# Patient Record
Sex: FEMALE | Race: BLACK OR AFRICAN AMERICAN | NOT HISPANIC OR LATINO
[De-identification: names, ages, dates, MRNs, and addresses within clinical notes are randomized per-mention and may not be internally consistent; named-entity substitution may affect disease eponyms.]

---

## 2020-09-15 ENCOUNTER — NON-APPOINTMENT (OUTPATIENT)
Age: 25
End: 2020-09-15

## 2020-09-15 ENCOUNTER — APPOINTMENT (OUTPATIENT)
Dept: OPHTHALMOLOGY | Facility: CLINIC | Age: 25
End: 2020-09-15
Payer: COMMERCIAL

## 2020-09-15 DIAGNOSIS — H10.13 ACUTE ATOPIC CONJUNCTIVITIS, BILATERAL: ICD-10-CM

## 2020-09-15 PROCEDURE — 92004 COMPRE OPH EXAM NEW PT 1/>: CPT

## 2020-11-05 ENCOUNTER — APPOINTMENT (OUTPATIENT)
Dept: OBGYN | Facility: CLINIC | Age: 25
End: 2020-11-05
Payer: COMMERCIAL

## 2020-11-05 VITALS
OXYGEN SATURATION: 98 % | HEIGHT: 69 IN | HEART RATE: 77 BPM | SYSTOLIC BLOOD PRESSURE: 113 MMHG | TEMPERATURE: 98.2 F | DIASTOLIC BLOOD PRESSURE: 77 MMHG | BODY MASS INDEX: 19.99 KG/M2 | WEIGHT: 135 LBS

## 2020-11-05 DIAGNOSIS — Z86.19 PERSONAL HISTORY OF OTHER INFECTIOUS AND PARASITIC DISEASES: ICD-10-CM

## 2020-11-05 DIAGNOSIS — B37.9 CANDIDIASIS, UNSPECIFIED: ICD-10-CM

## 2020-11-05 PROCEDURE — 99072 ADDL SUPL MATRL&STAF TM PHE: CPT

## 2020-11-05 PROCEDURE — 99203 OFFICE O/P NEW LOW 30 MIN: CPT

## 2020-11-05 RX ORDER — OLOPATADINE HYDROCHLORIDE 2 MG/ML
0.2 SOLUTION OPHTHALMIC DAILY
Qty: 1 | Refills: 6 | Status: COMPLETED | COMMUNITY
Start: 2020-09-15 | End: 2020-11-05

## 2020-11-05 NOTE — HISTORY OF PRESENT ILLNESS
[Rt Vulva] : right vulva [Chronic] : chronic [Hx HSV] : history of HSV [TextBox_4] : itching of vulva  [Normal Amount/Duration] :  normal amount and duration [Regular Cycle Intervals] : periods have been regular [Menarche Age: ____] : age at menarche was [unfilled] [FreeTextEntry1] : 11/04/2020 [Currently Active] : currently active [Men] : men [Vaginal] : vaginal [No] : No

## 2020-11-05 NOTE — PHYSICAL EXAM
[Appropriately responsive] : appropriately responsive [Alert] : alert [No Acute Distress] : no acute distress [No Lymphadenopathy] : no lymphadenopathy [Regular Rate Rhythm] : regular rate rhythm [No Murmurs] : no murmurs [Clear to Auscultation B/L] : clear to auscultation bilaterally [Soft] : soft [Non-tender] : non-tender [Non-distended] : non-distended [No HSM] : No HSM [No Lesions] : no lesions [No Mass] : no mass [Oriented x3] : oriented x3 [Examination Of The Breasts] : a normal appearance [No Masses] : no breast masses were palpable [Normal] : normal [Discharge] : discharge [Moderate] : moderate [Foul Smelling] : foul smelling [White] : white [Thick] : thick

## 2020-11-06 LAB
C TRACH RRNA SPEC QL NAA+PROBE: NOT DETECTED
N GONORRHOEA RRNA SPEC QL NAA+PROBE: NOT DETECTED
SOURCE AMPLIFICATION: NORMAL

## 2020-11-08 DIAGNOSIS — N76.0 ACUTE VAGINITIS: ICD-10-CM

## 2020-11-08 DIAGNOSIS — B96.89 ACUTE VAGINITIS: ICD-10-CM

## 2020-11-08 LAB
CANDIDA VAG CYTO: NOT DETECTED
G VAGINALIS+PREV SP MTYP VAG QL MICRO: DETECTED
T VAGINALIS VAG QL WET PREP: NOT DETECTED

## 2020-11-08 RX ORDER — FLUCONAZOLE 200 MG/1
200 TABLET ORAL
Qty: 2 | Refills: 0 | Status: COMPLETED | COMMUNITY
Start: 2020-08-13

## 2020-11-08 RX ORDER — AZITHROMYCIN 500 MG/1
500 TABLET, FILM COATED ORAL
Qty: 4 | Refills: 0 | Status: COMPLETED | COMMUNITY
Start: 2020-08-16

## 2020-12-23 PROBLEM — Z86.19 HISTORY OF CANDIDAL VULVOVAGINITIS: Status: RESOLVED | Noted: 2020-11-05 | Resolved: 2020-12-23

## 2020-12-23 PROBLEM — N76.0 BACTERIAL VAGINITIS: Status: RESOLVED | Noted: 2020-11-08 | Resolved: 2020-12-23

## 2021-04-15 ENCOUNTER — APPOINTMENT (OUTPATIENT)
Dept: OBGYN | Facility: CLINIC | Age: 26
End: 2021-04-15
Payer: COMMERCIAL

## 2021-04-15 VITALS
OXYGEN SATURATION: 99 % | BODY MASS INDEX: 19.4 KG/M2 | HEART RATE: 88 BPM | DIASTOLIC BLOOD PRESSURE: 73 MMHG | HEIGHT: 69 IN | SYSTOLIC BLOOD PRESSURE: 125 MMHG | WEIGHT: 131 LBS | TEMPERATURE: 97.9 F

## 2021-04-15 DIAGNOSIS — R82.90 UNSPECIFIED ABNORMAL FINDINGS IN URINE: ICD-10-CM

## 2021-04-15 DIAGNOSIS — A60.00 HERPESVIRAL INFECTION OF UROGENITAL SYSTEM, UNSPECIFIED: ICD-10-CM

## 2021-04-15 DIAGNOSIS — R87.610 ATYPICAL SQUAMOUS CELLS OF UNDETERMINED SIGNIFICANCE ON CYTOLOGIC SMEAR OF CERVIX (ASC-US): ICD-10-CM

## 2021-04-15 DIAGNOSIS — R35.0 FREQUENCY OF MICTURITION: ICD-10-CM

## 2021-04-15 PROCEDURE — 99072 ADDL SUPL MATRL&STAF TM PHE: CPT

## 2021-04-15 PROCEDURE — 99214 OFFICE O/P EST MOD 30 MIN: CPT

## 2021-04-15 RX ORDER — VALACYCLOVIR 1 G/1
1 TABLET, FILM COATED ORAL
Qty: 30 | Refills: 0 | Status: COMPLETED | COMMUNITY
Start: 2020-10-16 | End: 2021-04-15

## 2021-04-15 NOTE — PHYSICAL EXAM
[Appropriately responsive] : appropriately responsive [Alert] : alert [No Acute Distress] : no acute distress [No Lymphadenopathy] : no lymphadenopathy [Regular Rate Rhythm] : regular rate rhythm [No Murmurs] : no murmurs [Clear to Auscultation B/L] : clear to auscultation bilaterally [Soft] : soft [Non-tender] : non-tender [Non-distended] : non-distended [No HSM] : No HSM [No Lesions] : no lesions [No Mass] : no mass [Oriented x3] : oriented x3 [Examination Of The Breasts] : a normal appearance [No Masses] : no breast masses were palpable [Discharge] : discharge [Moderate] : moderate [Foul Smelling] : not foul smelling [White] : white [Blood-Tinged] : blood-tinged [Erosion] : erosions [Normal] : normal [Normal Position] : in a normal position [Uterine Adnexae] : normal

## 2021-04-15 NOTE — HISTORY OF PRESENT ILLNESS
[Normal Amount/Duration] :  normal amount and duration [Regular Cycle Intervals] : periods have been regular [Menarche Age: ____] : age at menarche was [unfilled] [FreeTextEntry1] : 04/02/2021 [No] : Patient does not have concerns regarding sex [Currently Active] : currently active [Men] : men [Vaginal] : vaginal

## 2021-04-15 NOTE — PLAN
[FreeTextEntry1] : patient states she has abnormal pap smear&did leep in 06/2019 at [facility]

## 2021-04-16 LAB
APPEARANCE: CLEAR
BACTERIA: NEGATIVE
BILIRUBIN URINE: NEGATIVE
BLOOD URINE: NEGATIVE
COLOR: YELLOW
GLUCOSE QUALITATIVE U: NEGATIVE
HYALINE CASTS: 1 /LPF
KETONES URINE: NEGATIVE
LEUKOCYTE ESTERASE URINE: NEGATIVE
MICROSCOPIC-UA: NORMAL
NITRITE URINE: NEGATIVE
PH URINE: 7.5
PROTEIN URINE: ABNORMAL
RED BLOOD CELLS URINE: 2 /HPF
SPECIFIC GRAVITY URINE: 1.03
SQUAMOUS EPITHELIAL CELLS: 2 /HPF
UROBILINOGEN URINE: ABNORMAL
WHITE BLOOD CELLS URINE: 2 /HPF

## 2021-04-17 LAB
BACTERIA UR CULT: NORMAL
C TRACH RRNA SPEC QL NAA+PROBE: NOT DETECTED
CANDIDA VAG CYTO: NOT DETECTED
G VAGINALIS+PREV SP MTYP VAG QL MICRO: DETECTED
HPV HIGH+LOW RISK DNA PNL CVX: NOT DETECTED
N GONORRHOEA RRNA SPEC QL NAA+PROBE: NOT DETECTED
SOURCE TP AMPLIFICATION: NORMAL
T VAGINALIS VAG QL WET PREP: NOT DETECTED

## 2021-04-19 ENCOUNTER — TRANSCRIPTION ENCOUNTER (OUTPATIENT)
Age: 26
End: 2021-04-19

## 2021-04-20 LAB — CYTOLOGY CVX/VAG DOC THIN PREP: NORMAL

## 2021-04-21 RX ORDER — METRONIDAZOLE 500 MG/1
500 TABLET ORAL TWICE DAILY
Qty: 14 | Refills: 3 | Status: DISCONTINUED | COMMUNITY
Start: 2020-11-08 | End: 2021-04-21

## 2021-05-19 ENCOUNTER — APPOINTMENT (OUTPATIENT)
Dept: INTERNAL MEDICINE | Facility: CLINIC | Age: 26
End: 2021-05-19
Payer: COMMERCIAL

## 2021-05-19 VITALS
WEIGHT: 132 LBS | BODY MASS INDEX: 19.55 KG/M2 | SYSTOLIC BLOOD PRESSURE: 123 MMHG | HEIGHT: 69 IN | HEART RATE: 69 BPM | DIASTOLIC BLOOD PRESSURE: 79 MMHG | OXYGEN SATURATION: 98 % | TEMPERATURE: 98 F

## 2021-05-19 PROCEDURE — 99385 PREV VISIT NEW AGE 18-39: CPT

## 2021-05-19 RX ORDER — TERCONAZOLE 8 MG/G
0.8 CREAM VAGINAL
Qty: 1 | Refills: 3 | Status: DISCONTINUED | COMMUNITY
Start: 2021-04-15 | End: 2021-05-19

## 2021-05-19 RX ORDER — TERCONAZOLE 8 MG/G
0.8 CREAM VAGINAL
Qty: 1 | Refills: 5 | Status: DISCONTINUED | COMMUNITY
Start: 2020-11-05 | End: 2021-05-19

## 2021-05-19 RX ORDER — METRONIDAZOLE 500 MG/1
500 TABLET ORAL TWICE DAILY
Qty: 14 | Refills: 3 | Status: DISCONTINUED | COMMUNITY
Start: 2021-04-15 | End: 2021-05-19

## 2021-05-19 NOTE — PHYSICAL EXAM
[No Acute Distress] : no acute distress [Well Developed] : well developed [Well-Appearing] : well-appearing [No Lymphadenopathy] : no lymphadenopathy [Supple] : supple [No Respiratory Distress] : no respiratory distress  [No Accessory Muscle Use] : no accessory muscle use [Clear to Auscultation] : lungs were clear to auscultation bilaterally [Normal Rate] : normal rate  [Regular Rhythm] : with a regular rhythm [Normal S1, S2] : normal S1 and S2 [No Edema] : there was no peripheral edema [Soft] : abdomen soft [Non Tender] : non-tender [Non-distended] : non-distended [Normal Bowel Sounds] : normal bowel sounds [No Spinal Tenderness] : no spinal tenderness [No Joint Swelling] : no joint swelling [No Skin Lesions] : no skin lesions [Normal Gait] : normal gait [Normal Affect] : the affect was normal [Alert and Oriented x3] : oriented to person, place, and time

## 2021-05-19 NOTE — HEALTH RISK ASSESSMENT
[Very Good] : ~his/her~  mood as very good [Yes] : Yes [2 - 4 times a month (2 pts)] : 2-4 times a month (2 points) [1 or 2 (0 pts)] : 1 or 2 (0 points) [Never (0 pts)] : Never (0 points) [No] : In the past 12 months have you used drugs other than those required for medical reasons? No [No falls in past year] : Patient reported no falls in the past year [0] : 2) Feeling down, depressed, or hopeless: Not at all (0) [Patient reported PAP Smear was normal] : Patient reported PAP Smear was normal [HIV Test offered] : HIV Test offered [Hepatitis C test offered] : Hepatitis C test offered [# of Members in Household ___] :  household currently consist of [unfilled] member(s) [Employed] : employed [Student] : student [Single] : single [Feels Safe at Home] : Feels safe at home [Smoke Detector] : smoke detector [Carbon Monoxide Detector] : carbon monoxide detector [Seat Belt] :  uses seat belt [Sunscreen] : uses sunscreen [] : No [Audit-CScore] : 2 [de-identified] : None [de-identified] : Regular diet. [[id number]] : 0 [Reports changes in hearing] : Reports no changes in hearing [Reports changes in vision] : Reports no changes in vision [Reports changes in dental health] : Reports no changes in dental health [PapSmearDate] : 04/21 [de-identified] : Roommate

## 2021-05-19 NOTE — HISTORY OF PRESENT ILLNESS
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

## 2021-05-21 LAB
ALBUMIN SERPL ELPH-MCNC: 4.6 G/DL
ALP BLD-CCNC: 48 U/L
ALT SERPL-CCNC: 12 U/L
ANION GAP SERPL CALC-SCNC: 14 MMOL/L
APPEARANCE: CLEAR
AST SERPL-CCNC: 16 U/L
BASOPHILS # BLD AUTO: 0.04 K/UL
BASOPHILS NFR BLD AUTO: 0.6 %
BILIRUB SERPL-MCNC: 0.3 MG/DL
BILIRUBIN URINE: NEGATIVE
BLOOD URINE: NEGATIVE
BUN SERPL-MCNC: 9 MG/DL
CALCIUM SERPL-MCNC: 9.9 MG/DL
CHLORIDE SERPL-SCNC: 103 MMOL/L
CHOLEST SERPL-MCNC: 187 MG/DL
CO2 SERPL-SCNC: 21 MMOL/L
COLOR: YELLOW
CREAT SERPL-MCNC: 0.74 MG/DL
EOSINOPHIL # BLD AUTO: 0.1 K/UL
EOSINOPHIL NFR BLD AUTO: 1.4 %
ESTIMATED AVERAGE GLUCOSE: 100 MG/DL
GLUCOSE QUALITATIVE U: NEGATIVE
GLUCOSE SERPL-MCNC: 76 MG/DL
HBA1C MFR BLD HPLC: 5.1 %
HBV CORE IGG+IGM SER QL: NONREACTIVE
HBV CORE IGM SER QL: NONREACTIVE
HBV SURFACE AB SER QL: REACTIVE
HBV SURFACE AG SER QL: NONREACTIVE
HCT VFR BLD CALC: 38.8 %
HCV AB SER QL: NONREACTIVE
HCV S/CO RATIO: 0.16 S/CO
HDLC SERPL-MCNC: 67 MG/DL
HGB BLD-MCNC: 12.2 G/DL
IMM GRANULOCYTES NFR BLD AUTO: 0.1 %
KETONES URINE: ABNORMAL
LDLC SERPL CALC-MCNC: 102 MG/DL
LEUKOCYTE ESTERASE URINE: NEGATIVE
LYMPHOCYTES # BLD AUTO: 3.43 K/UL
LYMPHOCYTES NFR BLD AUTO: 47.9 %
MAN DIFF?: NORMAL
MCHC RBC-ENTMCNC: 26.2 PG
MCHC RBC-ENTMCNC: 31.4 GM/DL
MCV RBC AUTO: 83.4 FL
MEV IGG FLD QL IA: 102 AU/ML
MEV IGG+IGM SER-IMP: POSITIVE
MONOCYTES # BLD AUTO: 0.36 K/UL
MONOCYTES NFR BLD AUTO: 5 %
NEUTROPHILS # BLD AUTO: 3.22 K/UL
NEUTROPHILS NFR BLD AUTO: 45 %
NITRITE URINE: NEGATIVE
NONHDLC SERPL-MCNC: 120 MG/DL
PH URINE: 6.5
PLATELET # BLD AUTO: 339 K/UL
POTASSIUM SERPL-SCNC: 3.8 MMOL/L
PROT SERPL-MCNC: 8.2 G/DL
PROTEIN URINE: NORMAL
RBC # BLD: 4.65 M/UL
RBC # FLD: 12.7 %
RUBV IGG FLD-ACNC: 7.8 INDEX
RUBV IGG SER-IMP: POSITIVE
RUBV IGM FLD-ACNC: <20 AU/ML
SODIUM SERPL-SCNC: 138 MMOL/L
SPECIFIC GRAVITY URINE: 1.03
TRIGL SERPL-MCNC: 92 MG/DL
UROBILINOGEN URINE: NORMAL
VZV AB TITR SER: POSITIVE
VZV IGG SER IF-ACNC: 587.1 INDEX
WBC # FLD AUTO: 7.16 K/UL

## 2021-05-26 ENCOUNTER — NON-APPOINTMENT (OUTPATIENT)
Age: 26
End: 2021-05-26

## 2021-05-26 LAB
HIV1+2 AB SPEC QL IA.RAPID: NONREACTIVE
M TB IFN-G BLD-IMP: NEGATIVE
MEV IGM SER QL: <0.91 ISR
QUANTIFERON TB PLUS MITOGEN MINUS NIL: 7.75 IU/ML
QUANTIFERON TB PLUS NIL: 0.07 IU/ML
QUANTIFERON TB PLUS TB1 MINUS NIL: -0.03 IU/ML
QUANTIFERON TB PLUS TB2 MINUS NIL: -0.02 IU/ML

## 2021-06-07 ENCOUNTER — APPOINTMENT (OUTPATIENT)
Dept: OBGYN | Facility: CLINIC | Age: 26
End: 2021-06-07
Payer: COMMERCIAL

## 2021-06-07 VITALS
HEART RATE: 77 BPM | WEIGHT: 133 LBS | SYSTOLIC BLOOD PRESSURE: 116 MMHG | BODY MASS INDEX: 19.7 KG/M2 | HEIGHT: 69 IN | TEMPERATURE: 97.9 F | DIASTOLIC BLOOD PRESSURE: 71 MMHG

## 2021-06-07 DIAGNOSIS — N76.0 ACUTE VAGINITIS: ICD-10-CM

## 2021-06-07 PROCEDURE — 99214 OFFICE O/P EST MOD 30 MIN: CPT

## 2021-06-09 LAB
CANDIDA VAG CYTO: NOT DETECTED
G VAGINALIS+PREV SP MTYP VAG QL MICRO: NOT DETECTED
T VAGINALIS VAG QL WET PREP: NOT DETECTED

## 2021-06-09 NOTE — HISTORY OF PRESENT ILLNESS
[FreeTextEntry1] : 24yo P0 LMP 5/16/2021 here for recurrent bv. \par reports has itchiness and discharge. \par since LEE 6/2019, been having recurrent bv and sometimes yeast infection.\par been treated for bv at least 3 times in the past 6 months. \par \par she is sexually active with long term male partner.\par uses condoms but symptoms not correlating to sexual intercourse. does not use feminine products, does not douche, uses mostly cotton underwear and pads, does not wear tight pants often.

## 2021-06-09 NOTE — DISCUSSION/SUMMARY
[FreeTextEntry1] : [] affirm\par [] vulvar hygiene reviewed\par [] metrogel treatment dose then 2x/wk suppression. instructions provided. pt understands to avoid alcohol consumption while taking this medication\par Michael ORNELAS

## 2021-06-14 ENCOUNTER — APPOINTMENT (OUTPATIENT)
Dept: OBGYN | Facility: CLINIC | Age: 26
End: 2021-06-14

## 2021-09-09 ENCOUNTER — APPOINTMENT (OUTPATIENT)
Dept: INTERNAL MEDICINE | Facility: CLINIC | Age: 26
End: 2021-09-09
Payer: COMMERCIAL

## 2021-09-09 VITALS
BODY MASS INDEX: 19.55 KG/M2 | TEMPERATURE: 97.8 F | OXYGEN SATURATION: 97 % | SYSTOLIC BLOOD PRESSURE: 124 MMHG | DIASTOLIC BLOOD PRESSURE: 77 MMHG | WEIGHT: 132 LBS | HEART RATE: 70 BPM | HEIGHT: 69 IN

## 2021-09-09 DIAGNOSIS — Z23 ENCOUNTER FOR IMMUNIZATION: ICD-10-CM

## 2021-09-09 DIAGNOSIS — L91.8 OTHER HYPERTROPHIC DISORDERS OF THE SKIN: ICD-10-CM

## 2021-09-09 PROCEDURE — 99213 OFFICE O/P EST LOW 20 MIN: CPT

## 2021-09-11 PROBLEM — L91.8 SKIN TAG: Status: ACTIVE | Noted: 2021-09-11

## 2021-09-11 PROBLEM — Z23 ENCOUNTER FOR IMMUNIZATION: Status: ACTIVE | Noted: 2021-09-09

## 2021-09-11 NOTE — HEALTH RISK ASSESSMENT
[Yes] : Yes [Monthly or less (1 pt)] : Monthly or less (1 point) [1 or 2 (0 pts)] : 1 or 2 (0 points) [Never (0 pts)] : Never (0 points) [No] : In the past 12 months have you used drugs other than those required for medical reasons? No [No falls in past year] : Patient reported no falls in the past year [0] : 2) Feeling down, depressed, or hopeless: Not at all (0) [] : No [Audit-CScore] : 1 [de-identified] : GYN [de-identified] : None [de-identified] : Regular Diet [[id number]] : 0

## 2021-09-11 NOTE — HISTORY OF PRESENT ILLNESS
[de-identified] : 26 yo F presenting for meningococcal and influenza vaccinations and concern for a rectal skin tag/hemorrhoid.

## 2021-09-11 NOTE — PHYSICAL EXAM
[No Acute Distress] : no acute distress [Well Developed] : well developed [Well-Appearing] : well-appearing [FreeTextEntry1] : skin tag noted at rectal opening, no tears, fissures, bleeding or erythema noted

## 2022-05-09 ENCOUNTER — APPOINTMENT (OUTPATIENT)
Dept: OBGYN | Facility: CLINIC | Age: 27
End: 2022-05-09
Payer: MEDICAID

## 2022-05-09 VITALS
OXYGEN SATURATION: 98 % | HEART RATE: 97 BPM | SYSTOLIC BLOOD PRESSURE: 115 MMHG | WEIGHT: 125 LBS | DIASTOLIC BLOOD PRESSURE: 71 MMHG | RESPIRATION RATE: 12 BRPM | HEIGHT: 69 IN | TEMPERATURE: 98.4 F | BODY MASS INDEX: 18.51 KG/M2

## 2022-05-09 DIAGNOSIS — Z87.410 PERSONAL HISTORY OF CERVICAL DYSPLASIA: ICD-10-CM

## 2022-05-09 DIAGNOSIS — Z01.419 ENCOUNTER FOR GYNECOLOGICAL EXAMINATION (GENERAL) (ROUTINE) W/OUT ABNORMAL FINDINGS: ICD-10-CM

## 2022-05-09 PROCEDURE — 99395 PREV VISIT EST AGE 18-39: CPT

## 2022-05-09 NOTE — HISTORY OF PRESENT ILLNESS
[FreeTextEntry1] : patient here for annual gyn exam.\par no new complaints. did suppression therapy for bv for 3months then stopped. no bv since then.\par no changes in health or menses.

## 2022-05-13 LAB — CYTOLOGY CVX/VAG DOC THIN PREP: NORMAL

## 2022-06-22 ENCOUNTER — LABORATORY RESULT (OUTPATIENT)
Age: 27
End: 2022-06-22

## 2022-06-22 ENCOUNTER — APPOINTMENT (OUTPATIENT)
Dept: INTERNAL MEDICINE | Facility: CLINIC | Age: 27
End: 2022-06-22
Payer: MEDICAID

## 2022-06-22 VITALS
HEIGHT: 69 IN | HEART RATE: 65 BPM | WEIGHT: 125 LBS | SYSTOLIC BLOOD PRESSURE: 110 MMHG | TEMPERATURE: 98 F | OXYGEN SATURATION: 100 % | BODY MASS INDEX: 18.51 KG/M2 | DIASTOLIC BLOOD PRESSURE: 71 MMHG

## 2022-06-22 DIAGNOSIS — Z02.1 ENCOUNTER FOR PRE-EMPLOYMENT EXAMINATION: ICD-10-CM

## 2022-06-22 PROCEDURE — 99395 PREV VISIT EST AGE 18-39: CPT | Mod: 25

## 2022-06-22 PROCEDURE — G0444 DEPRESSION SCREEN ANNUAL: CPT | Mod: 59

## 2022-06-22 NOTE — ASSESSMENT
[FreeTextEntry1] : annual exam- well adult\par screening and labs ordered\par further recs pending labs\par \par Blood work drawn in office today\par \par

## 2022-06-22 NOTE — HISTORY OF PRESENT ILLNESS
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

## 2022-06-22 NOTE — HEALTH RISK ASSESSMENT
[Good] : ~his/her~  mood as  good [Never] : Never [Yes] : Yes [2 - 4 times a month (2 pts)] : 2-4 times a month (2 points) [1 or 2 (0 pts)] : 1 or 2 (0 points) [Never (0 pts)] : Never (0 points) [No] : In the past 12 months have you used drugs other than those required for medical reasons? No [No falls in past year] : Patient reported no falls in the past year [0] : 2) Feeling down, depressed, or hopeless: Not at all (0) [Patient reported PAP Smear was normal] : Patient reported PAP Smear was normal [HIV Test offered] : HIV Test offered [Hepatitis C test offered] : Hepatitis C test offered [With Family] : lives with family [Employed] : employed [Single] : single [Smoke Detector] : smoke detector [Carbon Monoxide Detector] : carbon monoxide detector [Seat Belt] :  uses seat belt [PHQ-2 Negative - No further assessment needed] : PHQ-2 Negative - No further assessment needed [de-identified] : No [de-identified] : Gynecologist Dr. Whitman [Audit-CScore] : 2 [de-identified] : Push up every day [de-identified] : Healthy [[id number]] : 0 [Reports changes in hearing] : Reports no changes in hearing [Reports changes in vision] : Reports no changes in vision [Reports changes in dental health] : Reports no changes in dental health [Sunscreen] : does not use sunscreen [PapSmearDate] : 05/22 [FreeTextEntry3] : Dating

## 2022-06-23 DIAGNOSIS — D64.9 ANEMIA, UNSPECIFIED: ICD-10-CM

## 2022-06-23 LAB
FERRITIN SERPL-MCNC: 52 NG/ML
IRON SATN MFR SERPL: 32 %
IRON SERPL-MCNC: 112 UG/DL
TIBC SERPL-MCNC: 352 UG/DL
UIBC SERPL-MCNC: 240 UG/DL

## 2022-06-24 ENCOUNTER — APPOINTMENT (OUTPATIENT)
Dept: INTERNAL MEDICINE | Facility: CLINIC | Age: 27
End: 2022-06-24

## 2022-06-24 ENCOUNTER — NON-APPOINTMENT (OUTPATIENT)
Age: 27
End: 2022-06-24

## 2022-07-05 LAB
MEV IGG FLD QL IA: 107 AU/ML
MEV IGG+IGM SER-IMP: POSITIVE
MUV AB SER-ACNC: POSITIVE
MUV IGG SER QL IA: 239 AU/ML
RUBV IGG FLD-ACNC: 6.3 INDEX
RUBV IGG SER-IMP: POSITIVE
VZV AB TITR SER: POSITIVE
VZV IGG SER IF-ACNC: 683.9 INDEX

## 2022-07-06 LAB
ALBUMIN SERPL ELPH-MCNC: 4.7 G/DL
ALP BLD-CCNC: 48 U/L
ALT SERPL-CCNC: 12 U/L
ANION GAP SERPL CALC-SCNC: 10 MMOL/L
APPEARANCE: CLEAR
AST SERPL-CCNC: 16 U/L
BASOPHILS # BLD AUTO: 0.02 K/UL
BASOPHILS NFR BLD AUTO: 0.4 %
BILIRUB SERPL-MCNC: 0.7 MG/DL
BILIRUBIN URINE: NEGATIVE
BLOOD URINE: NEGATIVE
BUN SERPL-MCNC: 8 MG/DL
CALCIUM SERPL-MCNC: 9.5 MG/DL
CHLORIDE SERPL-SCNC: 107 MMOL/L
CO2 SERPL-SCNC: 24 MMOL/L
COLOR: YELLOW
CREAT SERPL-MCNC: 0.82 MG/DL
EGFR: 100 ML/MIN/1.73M2
EOSINOPHIL # BLD AUTO: 0.09 K/UL
EOSINOPHIL NFR BLD AUTO: 1.7 %
GLUCOSE QUALITATIVE U: NEGATIVE
GLUCOSE SERPL-MCNC: 70 MG/DL
HBV SURFACE AB SERPL IA-ACNC: 103.9 MIU/ML
HBV SURFACE AG SER QL: NONREACTIVE
HCT VFR BLD CALC: 35.8 %
HCV AB SER QL: NONREACTIVE
HCV S/CO RATIO: 0.13 S/CO
HGB BLD-MCNC: 11.3 G/DL
HIV1+2 AB SPEC QL IA.RAPID: NONREACTIVE
IMM GRANULOCYTES NFR BLD AUTO: 0.2 %
KETONES URINE: NEGATIVE
LEUKOCYTE ESTERASE URINE: NEGATIVE
LYMPHOCYTES # BLD AUTO: 2.25 K/UL
LYMPHOCYTES NFR BLD AUTO: 42.1 %
MAN DIFF?: NORMAL
MCHC RBC-ENTMCNC: 27 PG
MCHC RBC-ENTMCNC: 31.6 GM/DL
MCV RBC AUTO: 85.4 FL
MONOCYTES # BLD AUTO: 0.39 K/UL
MONOCYTES NFR BLD AUTO: 7.3 %
NEUTROPHILS # BLD AUTO: 2.59 K/UL
NEUTROPHILS NFR BLD AUTO: 48.3 %
NITRITE URINE: NEGATIVE
PH URINE: 8.5
PLATELET # BLD AUTO: 262 K/UL
POTASSIUM SERPL-SCNC: 4 MMOL/L
PROT SERPL-MCNC: 7.8 G/DL
PROTEIN URINE: ABNORMAL
RBC # BLD: 4.19 M/UL
RBC # FLD: 13.4 %
SODIUM SERPL-SCNC: 141 MMOL/L
SPECIFIC GRAVITY URINE: 1.03
TSH SERPL-ACNC: 0.27 UIU/ML
UROBILINOGEN URINE: NORMAL
WBC # FLD AUTO: 5.35 K/UL

## 2022-12-05 ENCOUNTER — APPOINTMENT (OUTPATIENT)
Dept: OBGYN | Facility: CLINIC | Age: 27
End: 2022-12-05

## 2022-12-05 VITALS
OXYGEN SATURATION: 100 % | HEIGHT: 69 IN | RESPIRATION RATE: 15 BRPM | BODY MASS INDEX: 18.51 KG/M2 | TEMPERATURE: 98.1 F | HEART RATE: 69 BPM | SYSTOLIC BLOOD PRESSURE: 105 MMHG | DIASTOLIC BLOOD PRESSURE: 69 MMHG | WEIGHT: 125 LBS

## 2022-12-05 DIAGNOSIS — K64.9 UNSPECIFIED HEMORRHOIDS: ICD-10-CM

## 2022-12-05 PROCEDURE — 99213 OFFICE O/P EST LOW 20 MIN: CPT

## 2022-12-06 NOTE — COUNSELING
[Medication Management] : medication management [Preconception Care/ Fertility options] : preconception care, fertility options

## 2022-12-06 NOTE — PHYSICAL EXAM
[Appropriately responsive] : appropriately responsive [Alert] : alert [No Acute Distress] : no acute distress [Oriented x3] : oriented x3 [External Hemorrhoid] : external hemorrhoid

## 2022-12-06 NOTE — HISTORY OF PRESENT ILLNESS
[FreeTextEntry1] : patient here for hemorrhoids.\par reports recently noted hemorrhoids and there's been bleeding and pain.\par has occasional constipation. \par \par considering elective egg freezing.

## 2022-12-20 ENCOUNTER — APPOINTMENT (OUTPATIENT)
Dept: SURGERY | Facility: CLINIC | Age: 27
End: 2022-12-20
Payer: MEDICAID

## 2022-12-20 VITALS — HEART RATE: 91 BPM | DIASTOLIC BLOOD PRESSURE: 65 MMHG | SYSTOLIC BLOOD PRESSURE: 119 MMHG | TEMPERATURE: 97.1 F

## 2022-12-20 VITALS — BODY MASS INDEX: 18.66 KG/M2 | HEIGHT: 69 IN | WEIGHT: 126 LBS

## 2022-12-20 DIAGNOSIS — Z78.9 OTHER SPECIFIED HEALTH STATUS: ICD-10-CM

## 2022-12-20 DIAGNOSIS — Z83.3 FAMILY HISTORY OF DIABETES MELLITUS: ICD-10-CM

## 2022-12-20 DIAGNOSIS — Z82.49 FAMILY HISTORY OF ISCHEMIC HEART DISEASE AND OTHER DISEASES OF THE CIRCULATORY SYSTEM: ICD-10-CM

## 2022-12-20 DIAGNOSIS — Z87.19 PERSONAL HISTORY OF OTHER DISEASES OF THE DIGESTIVE SYSTEM: ICD-10-CM

## 2022-12-20 PROCEDURE — 99203 OFFICE O/P NEW LOW 30 MIN: CPT | Mod: 25

## 2022-12-20 PROCEDURE — 46221 LIGATION OF HEMORRHOID(S): CPT

## 2022-12-26 NOTE — PHYSICAL EXAM
[Normal Breath Sounds] : Normal breath sounds [Normal Heart Sounds] : normal heart sounds [Normal Rate and Rhythm] : normal rate and rhythm [Alert] : alert [Oriented to Person] : oriented to person [Oriented to Place] : oriented to place [Oriented to Time] : oriented to time [Normal] : was normal [None] : no [Excoriation] : no perianal excoriation [Tender, Swollen] : nontender, non-swollen [Gross Blood] : no gross blood [JVD] : no jugular venous distention  [Wheezing] : no wheezing was heard [de-identified] : soft, non-distended, non-tender to palpation.  [de-identified] : LP external skintag w/ possible superimposed wart/condyloma. No pain on LUCI. Internal Exam below.  [de-identified] : LP external skintag/wart.  [de-identified] : awake, alert, NAD  [de-identified] : normocephalic, atraumatic, EOMI, normal conjunctiva  [de-identified] : b/l chest rise, EWOB on RA  [de-identified] : deferred [de-identified] : Normal strength  [de-identified] : Perianal as above  [de-identified] : normal mood and affect

## 2022-12-26 NOTE — PROCEDURE
[FreeTextEntry1] : Anoscopy w/ rubber band ligation - Performed with small lighted disposable anoscope, enlarged LA, midline and RA internal hemorrhoids, a band was placed midline without any bleeding at the end, pt tolerated the procedure. \par

## 2022-12-26 NOTE — ASSESSMENT
[FreeTextEntry1] : Ms. THONG DANIEL  is a 27 year F presenting with anal discomfort and bleeding most likely due to internal and external hemorrhoids.

## 2022-12-26 NOTE — HISTORY OF PRESENT ILLNESS
[FreeTextEntry1] : Ms. THONG DANIEL  is a 27 year F with a history of anemia and herpes presenting with anal discomfort here for an initial visit. She originally noticed a perianal mass in 2/2023. She went to see her PCP who diagnosed it as a perianal skintag and was subsequently referred to GYN. Dr. Whitman diagnosed her with hemorrhoids. She was prescribed hydrocortisone cream which helped with anal discomfort and swelling. Currently she has a BM either once a day or every other day without straining. She noticed more gas as of lately but has been drinking more coffee. She has sporadic diarrhea once every 2 weeks which leads to anal bleeding. She notices that soft stools occasionally lead to bleeding as well. Denies taking fiber supplement and is not on a bowel regime. She has never had a colonoscopy.

## 2022-12-26 NOTE — REVIEW OF SYSTEMS
[As Noted in HPI] : as noted in HPI [Negative] : Neurological [Diarrhea] : diarrhea [Fever] : no fever [Chills] : no chills [Feeling Poorly] : not feeling poorly [Feeling Tired] : not feeling tired [Eye Pain] : no eye pain [Earache] : no earache [Chest Pain] : no chest pain [Palpitations] : no palpitations [Shortness Of Breath] : no shortness of breath [Abdominal Pain] : no abdominal pain [Vomiting] : no vomiting [Constipation] : no constipation [Dysuria] : no dysuria [FreeTextEntry7] : bloody stools every 2 weeks, perianal mass

## 2022-12-27 ENCOUNTER — NON-APPOINTMENT (OUTPATIENT)
Age: 27
End: 2022-12-27

## 2022-12-28 ENCOUNTER — NON-APPOINTMENT (OUTPATIENT)
Age: 27
End: 2022-12-28

## 2022-12-28 LAB
ANION GAP SERPL CALC-SCNC: 9 MMOL/L
APTT BLD: 39.4 SEC
BASOPHILS # BLD AUTO: 0.04 K/UL
BASOPHILS NFR BLD AUTO: 0.8 %
BUN SERPL-MCNC: 9 MG/DL
CALCIUM SERPL-MCNC: 9.8 MG/DL
CHLORIDE SERPL-SCNC: 103 MMOL/L
CO2 SERPL-SCNC: 27 MMOL/L
CREAT SERPL-MCNC: 0.66 MG/DL
EGFR: 122 ML/MIN/1.73M2
EOSINOPHIL # BLD AUTO: 0.07 K/UL
EOSINOPHIL NFR BLD AUTO: 1.4 %
GLUCOSE SERPL-MCNC: 89 MG/DL
HCT VFR BLD CALC: 38.4 %
HGB BLD-MCNC: 12.2 G/DL
IMM GRANULOCYTES NFR BLD AUTO: 0.2 %
INR PPP: 1.05 RATIO
LYMPHOCYTES # BLD AUTO: 2.7 K/UL
LYMPHOCYTES NFR BLD AUTO: 52.9 %
MAN DIFF?: NORMAL
MCHC RBC-ENTMCNC: 27.5 PG
MCHC RBC-ENTMCNC: 31.8 GM/DL
MCV RBC AUTO: 86.5 FL
MONOCYTES # BLD AUTO: 0.33 K/UL
MONOCYTES NFR BLD AUTO: 6.5 %
NEUTROPHILS # BLD AUTO: 1.95 K/UL
NEUTROPHILS NFR BLD AUTO: 38.2 %
PLATELET # BLD AUTO: 269 K/UL
POTASSIUM SERPL-SCNC: 4.2 MMOL/L
PT BLD: 12.4 SEC
RBC # BLD: 4.44 M/UL
RBC # FLD: 12.7 %
SODIUM SERPL-SCNC: 139 MMOL/L
WBC # FLD AUTO: 5.1 K/UL

## 2023-01-20 ENCOUNTER — APPOINTMENT (OUTPATIENT)
Dept: HUMAN REPRODUCTION | Facility: CLINIC | Age: 28
End: 2023-01-20
Payer: SELF-PAY

## 2023-01-20 PROCEDURE — 36415 COLL VENOUS BLD VENIPUNCTURE: CPT

## 2023-01-20 PROCEDURE — 99205 OFFICE O/P NEW HI 60 MIN: CPT | Mod: 25

## 2023-01-20 PROCEDURE — 76830 TRANSVAGINAL US NON-OB: CPT

## 2023-01-23 DIAGNOSIS — Z01.818 ENCOUNTER FOR OTHER PREPROCEDURAL EXAMINATION: ICD-10-CM

## 2023-01-30 LAB — SARS-COV-2 N GENE NPH QL NAA+PROBE: DETECTED

## 2023-02-03 ENCOUNTER — APPOINTMENT (OUTPATIENT)
Dept: INTERNAL MEDICINE | Facility: CLINIC | Age: 28
End: 2023-02-03
Payer: MEDICAID

## 2023-02-03 ENCOUNTER — APPOINTMENT (OUTPATIENT)
Dept: HUMAN REPRODUCTION | Facility: CLINIC | Age: 28
End: 2023-02-03
Payer: SELF-PAY

## 2023-02-03 ENCOUNTER — OUTPATIENT (OUTPATIENT)
Dept: OUTPATIENT SERVICES | Facility: HOSPITAL | Age: 28
LOS: 1 days | End: 2023-02-03
Payer: MEDICAID

## 2023-02-03 VITALS
DIASTOLIC BLOOD PRESSURE: 70 MMHG | HEART RATE: 68 BPM | OXYGEN SATURATION: 99 % | WEIGHT: 128.09 LBS | SYSTOLIC BLOOD PRESSURE: 112 MMHG | HEIGHT: 69 IN | TEMPERATURE: 97 F | RESPIRATION RATE: 16 BRPM

## 2023-02-03 VITALS
OXYGEN SATURATION: 100 % | HEART RATE: 62 BPM | TEMPERATURE: 98.7 F | BODY MASS INDEX: 18.81 KG/M2 | SYSTOLIC BLOOD PRESSURE: 121 MMHG | HEIGHT: 69 IN | WEIGHT: 127 LBS | DIASTOLIC BLOOD PRESSURE: 76 MMHG

## 2023-02-03 DIAGNOSIS — L91.8 OTHER HYPERTROPHIC DISORDERS OF THE SKIN: ICD-10-CM

## 2023-02-03 DIAGNOSIS — Z01.818 ENCOUNTER FOR OTHER PREPROCEDURAL EXAMINATION: ICD-10-CM

## 2023-02-03 DIAGNOSIS — Z98.890 OTHER SPECIFIED POSTPROCEDURAL STATES: Chronic | ICD-10-CM

## 2023-02-03 PROCEDURE — 99213 OFFICE O/P EST LOW 20 MIN: CPT

## 2023-02-03 PROCEDURE — G0463: CPT

## 2023-02-03 PROCEDURE — 99215 OFFICE O/P EST HI 40 MIN: CPT | Mod: 95

## 2023-02-03 NOTE — HISTORY OF PRESENT ILLNESS
[FreeTextEntry8] : The patient is a 27 year old F who presents to the office for L breast mass\par She says the mass has been present for about 1 month\par It is unrelated to hr menstrual cycle, nonpainful\par No overlying skin changes \par No known family h/o breast cancer \par She endorses previous fibroadenoma in similar location

## 2023-02-03 NOTE — H&P PST ADULT - HISTORY OF COVID-19 VACCINATION
IMPRESSION: Rehab of gait dysfunction      PRECAUTIONS: [  ] Cardiac  [  ] Respiratory  [  ] Seizures [  ] Contact Isolation  [  ] Droplet Isolation  [  ] Other    Weight Bearing Status:     RECOMMENDATION:    Out of Bed to Chair     DVT/Decubiti Prophylaxis    REHAB PLAN:     [ x  ] Bedside P/T 3-5 times a week   [   ]   Bedside O/T  2-3 times a week             [   ] No Rehab Therapy Indicated                   [   ]  Speech Therapy   Conditioning/ROM                                    ADL  Bed Mobility                                               Conditioning/ROM  Transfers                                                     Bed Mobility  Sitting /Standing Balance                         Transfers                                        Gait Training                                               Sitting/Standing Balance  Stair Training [   ]Applicable                    Home equipment Eval                                                                        Splinting  [   ] Only      GOALS:   ADL   [   ]   Independent                    Transfers  [ x  ] Independent                          Ambulation  [x   ] Independent     [ x   ] With device                            [   ]  CG                                                         [   ]  CG                                                                  [   ] CG                            [    ] Min A                                                   [   ] Min A                                                              [   ] Min  A          DISCHARGE PLAN:   [   ]  Good candidate for Intensive Rehabilitation/Hospital based                                             Will tolerate 3hrs Intensive Rehab Daily                                       [ x   ]  Short Term Rehab in Skilled Nursing Facility                              vs         [ x   ]  Home with Outpatient or VN services                                         [    ]  Possible Candidate for Intensive Hospital based Rehab Yes

## 2023-02-03 NOTE — H&P PST ADULT - PSYCHIATRIC
normal/alert and oriented x3/normal behavior negative normal/normal affect/alert and oriented x3/normal behavior

## 2023-02-03 NOTE — PHYSICAL EXAM
[No Acute Distress] : no acute distress [Normal Appearance] : normal in appearance [No Nipple Discharge] : no nipple discharge [No Axillary Lymphadenopathy] : no axillary lymphadenopathy [de-identified] : (Chaperone = MOA Leandra); L breast with palpable 1x1 firm, non-tender, annular mass about 1 cm from nipple at 2 o'clock position

## 2023-02-03 NOTE — H&P PST ADULT - PROBLEM SELECTOR PLAN 1
English
scheduled for examination under anesthesia excision of perianal skin tag.      Pt instructed to be NPO the night before and the morning of surgery. Provided with chlorhexidene 4% solution to wash for 3 days including the morning of surgery. Written instructions given and pt verbalized understanding. Escort required post procedure  Stop Bang Score=0 Pt is low risk for FATUMA.

## 2023-02-03 NOTE — H&P PST ADULT - HISTORY OF PRESENT ILLNESS
27 yr old healthy female with history of Herpes and HPV presents with other hypertrophic disorders of the skin. Pt stated had skin tag near her anus for 1 year with no problem and shown to PCP. Pt noticed later on skin tag was inflamed and given Hydrocortisone cream and resolved the issue. Pt had consult with surgeon and scheduled for examination under anesthesia excision of perianal skin tag on 2/6/2023.

## 2023-02-03 NOTE — HEALTH RISK ASSESSMENT
[Yes] : Yes [1 or 2 (0 pts)] : 1 or 2 (0 points) [Never (0 pts)] : Never (0 points) [No] : In the past 12 months have you used drugs other than those required for medical reasons? No [No falls in past year] : Patient reported no falls in the past year [0] : 2) Feeling down, depressed, or hopeless: Not at all (0) [Audit-CScore] : 1 [de-identified] : gym [de-identified] : healthy [[id number]] : 0

## 2023-02-03 NOTE — H&P PST ADULT - NSANTHOSAYNRD_GEN_A_CORE
No. FATUMA screening performed.  STOP BANG Legend: 0-2 = LOW Risk; 3-4 = INTERMEDIATE Risk; 5-8 = HIGH Risk

## 2023-02-03 NOTE — H&P PST ADULT - NSICDXFAMILYHX_GEN_ALL_CORE_FT
FAMILY HISTORY:  Mother  Still living? Yes, Estimated age: 53  Known health problems: none, Age at diagnosis: Age Unknown

## 2023-02-03 NOTE — H&P PST ADULT - NS ATTEND AMEND GEN_ALL_CORE FT
OR today for EUA, excision of perianal skin tag  Prone jackknife  MAC for anesthesia  0.5% marcaine and exparel 1:1    Yuriy Ruth MD  Attending physician

## 2023-02-05 ENCOUNTER — TRANSCRIPTION ENCOUNTER (OUTPATIENT)
Age: 28
End: 2023-02-05

## 2023-02-06 ENCOUNTER — OUTPATIENT (OUTPATIENT)
Dept: OUTPATIENT SERVICES | Facility: HOSPITAL | Age: 28
LOS: 1 days | End: 2023-02-06
Payer: MEDICAID

## 2023-02-06 ENCOUNTER — TRANSCRIPTION ENCOUNTER (OUTPATIENT)
Age: 28
End: 2023-02-06

## 2023-02-06 ENCOUNTER — APPOINTMENT (OUTPATIENT)
Dept: SURGERY | Facility: HOSPITAL | Age: 28
End: 2023-02-06
Payer: MEDICAID

## 2023-02-06 ENCOUNTER — RESULT REVIEW (OUTPATIENT)
Age: 28
End: 2023-02-06

## 2023-02-06 VITALS
DIASTOLIC BLOOD PRESSURE: 74 MMHG | RESPIRATION RATE: 14 BRPM | TEMPERATURE: 99 F | HEART RATE: 66 BPM | OXYGEN SATURATION: 100 % | SYSTOLIC BLOOD PRESSURE: 113 MMHG

## 2023-02-06 VITALS
DIASTOLIC BLOOD PRESSURE: 65 MMHG | TEMPERATURE: 99 F | SYSTOLIC BLOOD PRESSURE: 107 MMHG | HEART RATE: 68 BPM | WEIGHT: 128.09 LBS | RESPIRATION RATE: 16 BRPM | OXYGEN SATURATION: 100 % | HEIGHT: 69 IN

## 2023-02-06 DIAGNOSIS — Z98.890 OTHER SPECIFIED POSTPROCEDURAL STATES: Chronic | ICD-10-CM

## 2023-02-06 DIAGNOSIS — L91.8 OTHER HYPERTROPHIC DISORDERS OF THE SKIN: ICD-10-CM

## 2023-02-06 DIAGNOSIS — Z01.818 ENCOUNTER FOR OTHER PREPROCEDURAL EXAMINATION: ICD-10-CM

## 2023-02-06 LAB — HCG UR QL: NEGATIVE — SIGNIFICANT CHANGE UP

## 2023-02-06 PROCEDURE — 46220 EXCISE ANAL EXT TAG/PAPILLA: CPT

## 2023-02-06 PROCEDURE — 81025 URINE PREGNANCY TEST: CPT

## 2023-02-06 PROCEDURE — 88305 TISSUE EXAM BY PATHOLOGIST: CPT

## 2023-02-06 PROCEDURE — 88305 TISSUE EXAM BY PATHOLOGIST: CPT | Mod: 26

## 2023-02-06 RX ORDER — OXYCODONE HYDROCHLORIDE 5 MG/1
1 TABLET ORAL
Qty: 5 | Refills: 0
Start: 2023-02-06

## 2023-02-06 RX ORDER — POLYETHYLENE GLYCOL 3350 17 G/17G
17 POWDER, FOR SOLUTION ORAL
Qty: 51 | Refills: 0
Start: 2023-02-06 | End: 2023-02-08

## 2023-02-06 RX ORDER — DOCUSATE SODIUM 100 MG
1 CAPSULE ORAL
Qty: 63 | Refills: 0
Start: 2023-02-06 | End: 2023-02-26

## 2023-02-06 RX ORDER — ACETAMINOPHEN 500 MG
2 TABLET ORAL
Qty: 40 | Refills: 0
Start: 2023-02-06 | End: 2023-02-10

## 2023-02-06 RX ORDER — IBUPROFEN 200 MG
1 TABLET ORAL
Qty: 20 | Refills: 0
Start: 2023-02-06 | End: 2023-02-10

## 2023-02-06 RX ORDER — SODIUM CHLORIDE 9 MG/ML
3 INJECTION INTRAMUSCULAR; INTRAVENOUS; SUBCUTANEOUS EVERY 8 HOURS
Refills: 0 | Status: DISCONTINUED | OUTPATIENT
Start: 2023-02-06 | End: 2023-02-06

## 2023-02-06 NOTE — ASU PATIENT PROFILE, ADULT - FALL HARM RISK - UNIVERSAL INTERVENTIONS
Bed in lowest position, wheels locked, appropriate side rails in place/Call bell, personal items and telephone in reach/Instruct patient to call for assistance before getting out of bed or chair/Non-slip footwear when patient is out of bed/[place]elches to call system/Purposeful Proactive Rounding/Room/bathroom lighting operational, light cord in reach

## 2023-02-06 NOTE — ASU DISCHARGE PLAN (ADULT/PEDIATRIC) - NS MD DC FALL RISK RISK
For information on Fall & Injury Prevention, visit: [email or web address] OR  [email or web address] OR  https://www.cdc.gov/steadi/patient.html

## 2023-02-08 PROBLEM — L91.8 OTHER HYPERTROPHIC DISORDERS OF THE SKIN: Chronic | Status: ACTIVE | Noted: 2023-02-03

## 2023-02-08 PROBLEM — B00.9 HERPESVIRAL INFECTION, UNSPECIFIED: Chronic | Status: ACTIVE | Noted: 2023-02-03

## 2023-02-08 PROBLEM — B97.7 PAPILLOMAVIRUS AS THE CAUSE OF DISEASES CLASSIFIED ELSEWHERE: Chronic | Status: ACTIVE | Noted: 2023-02-03

## 2023-02-09 ENCOUNTER — NON-APPOINTMENT (OUTPATIENT)
Age: 28
End: 2023-02-09

## 2023-02-10 LAB — SURGICAL PATHOLOGY STUDY: SIGNIFICANT CHANGE UP

## 2023-02-17 DIAGNOSIS — Z00.00 ENCOUNTER FOR GENERAL ADULT MEDICAL EXAMINATION W/OUT ABNORMAL FINDINGS: ICD-10-CM

## 2023-02-17 DIAGNOSIS — N63.20 UNSPECIFIED LUMP IN THE LEFT BREAST, UNSPECIFIED QUADRANT: ICD-10-CM

## 2023-02-24 ENCOUNTER — RESULT REVIEW (OUTPATIENT)
Age: 28
End: 2023-02-24

## 2023-02-24 DIAGNOSIS — N63.20 UNSPECIFIED LUMP IN THE LEFT BREAST, UNSPECIFIED QUADRANT: ICD-10-CM

## 2023-02-28 ENCOUNTER — APPOINTMENT (OUTPATIENT)
Dept: SURGERY | Facility: CLINIC | Age: 28
End: 2023-02-28
Payer: MEDICAID

## 2023-02-28 VITALS
HEIGHT: 69 IN | BODY MASS INDEX: 19.4 KG/M2 | DIASTOLIC BLOOD PRESSURE: 69 MMHG | WEIGHT: 131 LBS | TEMPERATURE: 97.1 F | SYSTOLIC BLOOD PRESSURE: 115 MMHG | HEART RATE: 63 BPM

## 2023-02-28 PROCEDURE — 99024 POSTOP FOLLOW-UP VISIT: CPT

## 2023-03-03 ENCOUNTER — RESULT REVIEW (OUTPATIENT)
Age: 28
End: 2023-03-03

## 2023-03-03 ENCOUNTER — APPOINTMENT (OUTPATIENT)
Dept: ULTRASOUND IMAGING | Facility: IMAGING CENTER | Age: 28
End: 2023-03-03
Payer: MEDICAID

## 2023-03-03 ENCOUNTER — OUTPATIENT (OUTPATIENT)
Dept: OUTPATIENT SERVICES | Facility: HOSPITAL | Age: 28
LOS: 1 days | End: 2023-03-03
Payer: MEDICAID

## 2023-03-03 DIAGNOSIS — Z00.8 ENCOUNTER FOR OTHER GENERAL EXAMINATION: ICD-10-CM

## 2023-03-03 DIAGNOSIS — Z98.890 OTHER SPECIFIED POSTPROCEDURAL STATES: Chronic | ICD-10-CM

## 2023-03-03 PROCEDURE — 77065 DX MAMMO INCL CAD UNI: CPT

## 2023-03-03 PROCEDURE — A4648: CPT

## 2023-03-03 PROCEDURE — 77065 DX MAMMO INCL CAD UNI: CPT | Mod: 26,LT

## 2023-03-03 PROCEDURE — 19083 BX BREAST 1ST LESION US IMAG: CPT

## 2023-03-03 PROCEDURE — 88305 TISSUE EXAM BY PATHOLOGIST: CPT | Mod: 26

## 2023-03-03 PROCEDURE — 88305 TISSUE EXAM BY PATHOLOGIST: CPT

## 2023-03-03 PROCEDURE — 19083 BX BREAST 1ST LESION US IMAG: CPT | Mod: LT

## 2023-03-04 NOTE — REVIEW OF SYSTEMS
[As Noted in HPI] : as noted in HPI [Negative] : Neurological [Fever] : no fever [Chills] : no chills [Feeling Poorly] : not feeling poorly [Feeling Tired] : not feeling tired [Eye Pain] : no eye pain [Earache] : no earache [Chest Pain] : no chest pain [Palpitations] : no palpitations [Shortness Of Breath] : no shortness of breath [Abdominal Pain] : no abdominal pain [Vomiting] : no vomiting [Constipation] : no constipation [Dysuria] : no dysuria [FreeTextEntry7] : perianal pain after BMs

## 2023-03-04 NOTE — PHYSICAL EXAM
[None] : no anal fissures seen [Normal Rate and Rhythm] : normal rate and rhythm [Alert] : alert [Oriented to Person] : oriented to person [Oriented to Place] : oriented to place [Oriented to Time] : oriented to time [Excoriation] : no perianal excoriation [JVD] : no jugular venous distention  [de-identified] : soft, non-distended, non-tender to palpation.  [de-identified] : minimally inflamed residual LP external skintag. LUCI/internal exam deferred.  [de-identified] : awake, alert, NAD  [de-identified] : normocephalic, atraumatic, EOMI, normal conjunctiva  [de-identified] : b/l chest rise, EWOB on RA  [de-identified] : deferred [de-identified] : Normal strength  [de-identified] : Perianal as above  [de-identified] : normal mood and affect

## 2023-03-04 NOTE — ASSESSMENT
[FreeTextEntry1] : Ms. THONG DANIEL  is a 27 year F presenting with anal discomfort and bleeding most likely due to internal and external hemorrhoids s/p perianal skintag excision on 2/6/2023 here for a postop visit.

## 2023-03-04 NOTE — HISTORY OF PRESENT ILLNESS
[FreeTextEntry1] : Ms. THONG DANIEL  is a 27 year F with a history of anemia and herpes presenting with anal discomfort s/p perianal skintag excision on 2/6/2023 here for a postop visit. She originally noticed a perianal mass in 2/2023. She went to see her PCP who diagnosed it as a perianal skintag and was subsequently referred to GYN. Dr. Whitman diagnosed her with hemorrhoids. She was prescribed hydrocortisone cream which helped with anal discomfort and swelling. She has a BM either once a day or every other day without straining. She has sporadic diarrhea once every 2 weeks which leads to anal bleeding. She notices that soft stools occasionally lead to bleeding as well. Denies taking fiber supplement and is not on a bowel regimen. She has never had a colonoscopy. On 2/6/2023 she underwent perianal skintag excision. Postop, she has some expected pain with BMs and swelling. She has to take Ibuprofen after BMs for the discomfort. Denies bleeding, fevers/chills.

## 2023-03-06 LAB — SURGICAL PATHOLOGY STUDY: SIGNIFICANT CHANGE UP

## 2023-03-09 ENCOUNTER — APPOINTMENT (OUTPATIENT)
Dept: SURGICAL ONCOLOGY | Facility: CLINIC | Age: 28
End: 2023-03-09
Payer: MEDICAID

## 2023-03-09 ENCOUNTER — NON-APPOINTMENT (OUTPATIENT)
Age: 28
End: 2023-03-09

## 2023-03-09 VITALS
SYSTOLIC BLOOD PRESSURE: 103 MMHG | HEART RATE: 83 BPM | HEIGHT: 69 IN | OXYGEN SATURATION: 99 % | DIASTOLIC BLOOD PRESSURE: 68 MMHG | WEIGHT: 129 LBS | BODY MASS INDEX: 19.11 KG/M2 | RESPIRATION RATE: 15 BRPM

## 2023-03-09 DIAGNOSIS — R92.8 OTHER ABNORMAL AND INCONCLUSIVE FINDINGS ON DIAGNOSTIC IMAGING OF BREAST: ICD-10-CM

## 2023-03-09 PROCEDURE — 99203 OFFICE O/P NEW LOW 30 MIN: CPT

## 2023-03-26 NOTE — ASSESSMENT
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  I reviewed her breast imaging and the mobile left breast mass is c/w recent biopsy proving 1.3cm fibroadenoma.   \par We discussed that fibroadenomas are benign and management options includes active surveillance vs surgical excision.  We discussed that surgical excision is recommended when either >2cm,  painful, or if it is enlarging in order to rule out phyllodes tumor.  She is currently asymptomatic and the left fibroadenoma is <2cm.   Will proceed with imaging surveillance.  \par \par Next imagin month f/u  BL Dx US due 2023 to evaluate stability of probably benign nodules in BL breast and L 3:00 RA biopsy site/mass \par RTO to office in 6 months (after imaging is completed). She knows to return sooner if any breast imaging abnormalities or if any breast issues/concerns arise. \par

## 2023-03-26 NOTE — PHYSICAL EXAM
[Normal] : normal breast inspection and palpation of axillas [Normal Neck Lymph Nodes] : normal neck lymph nodes  [Normal Supraclavicular Lymph Nodes] : normal supraclavicular lymph nodes [Normal Axillary Lymph Nodes] : normal axillary lymph nodes [Normal] : oriented to person, place and time, with appropriate affect [de-identified] : Normal exam with the exception of left breast faintly palpable mobile mass with recent biopsy site changes as expected.    No clinical lymphadenopathy.  [de-identified] : Normal respiratory effort

## 2023-03-26 NOTE — HISTORY OF PRESENT ILLNESS
[de-identified] : THONG DANIEL is a 27 year F who presents for initial evaluation of palpable Left breast Masses \par \par States she usually is able to palpable breast lumps when menstruating but recently noticed Left breast masses enlarging in November-2022 and went to her PCP.  Denies any nipple discharge or skin changes.   Breast imaging workup revealed birads4 at site of left breast palpable 1.3cm mass and additional bilateral breast birads3 benign-appearing findings.  Left 3:00 mass core bx c/w fibroadenoma.  \par \par Referred by: Dr. Angeles Rivers (PCP) \par \par PMHx: Denies \par PSHx: L eft Breast umpectomy 2014 - Fibroadenoma removal  ([facility])\par Meds: Vitamin D \par NKDA\par Family Hx: Breast cancer of ovarian cancer, ,, maternal cousin 1 st - Hodgkins \par GYN: , menarche age 13, LMP 2/15/23. .\par Bra size: 32 B \par Smoking or ETOH use: \par Caffeine: former Coffee drinker--> Mud water, No soda

## 2023-03-26 NOTE — RESULTS/DATA
[FreeTextEntry1] : BREAST PATH/RAD REVIEW\par \par 02/17/2023  L DX US: BIRADs 4\par - L 3:00-RA 13 mm (palp) solid appearing nodule/mass with intraductal extension - Rec USGBx \par - L 12N1 7 mm anterior complicated septated cyst, L 2N2 8 mm (palp) oval shaped complicated solid cystic oval shaped nodule, L 3N2 5 mm hypoechoic complicated cyst vs homogeneous solid nodule -> Rec 6 month f/u L Dx US \par - L axilla w/ benign appearing LN\par \par 02/21/2023 BL Dx MG/ R Dx US: BIRADs 4, Extremely dense.\par - L middle -third lateral aspect palpable area w/ No mammographic findings\par - R 7N4 8 mm hypoechoic nodule, consider fibroadenoma-> Rec 6 month f/u R dx US\par -  R axilla w/ 11 mm benign appearing LN \par ~~~ rec L USGBX of L 3:00 nodule not seen on MG 2/2 extremely dense breast, 6 month f/u L Dx US for prev ^described findings \par \par 03/03/2023 L USGBx:\par ~ L 3:00 Retroareolar core Bx: Fibroadenoma, benign and concordant (Ribbon marker) - Rec 6 month f/u BL Dx US

## 2023-06-01 NOTE — ASU PREOP CHECKLIST - AS BP NONINV METHOD
electronic Low Dose Naltrexone Counseling- I discussed with the patient the potential risks and side effects of low dose naltrexone including but not limited to: more vivid dreams, headaches, nausea, vomiting, abdominal pain, fatigue, dizziness, and anxiety.

## 2023-09-13 ENCOUNTER — APPOINTMENT (OUTPATIENT)
Dept: ULTRASOUND IMAGING | Facility: IMAGING CENTER | Age: 28
End: 2023-09-13
Payer: MEDICAID

## 2023-09-13 ENCOUNTER — RESULT REVIEW (OUTPATIENT)
Age: 28
End: 2023-09-13

## 2023-09-13 ENCOUNTER — OUTPATIENT (OUTPATIENT)
Dept: OUTPATIENT SERVICES | Facility: HOSPITAL | Age: 28
LOS: 1 days | End: 2023-09-13
Payer: MEDICAID

## 2023-09-13 DIAGNOSIS — R92.8 OTHER ABNORMAL AND INCONCLUSIVE FINDINGS ON DIAGNOSTIC IMAGING OF BREAST: ICD-10-CM

## 2023-09-13 DIAGNOSIS — Z98.890 OTHER SPECIFIED POSTPROCEDURAL STATES: Chronic | ICD-10-CM

## 2023-09-13 PROCEDURE — 76641 ULTRASOUND BREAST COMPLETE: CPT

## 2023-09-13 PROCEDURE — 76641 ULTRASOUND BREAST COMPLETE: CPT | Mod: 26,50

## 2023-11-03 ENCOUNTER — APPOINTMENT (OUTPATIENT)
Dept: INTERNAL MEDICINE | Facility: CLINIC | Age: 28
End: 2023-11-03
Payer: MEDICAID

## 2023-11-03 VITALS
HEART RATE: 74 BPM | TEMPERATURE: 97.9 F | DIASTOLIC BLOOD PRESSURE: 73 MMHG | OXYGEN SATURATION: 99 % | BODY MASS INDEX: 21.92 KG/M2 | HEIGHT: 69 IN | RESPIRATION RATE: 16 BRPM | WEIGHT: 148 LBS | SYSTOLIC BLOOD PRESSURE: 117 MMHG

## 2023-11-03 DIAGNOSIS — R63.5 ABNORMAL WEIGHT GAIN: ICD-10-CM

## 2023-11-03 DIAGNOSIS — L60.0 INGROWING NAIL: ICD-10-CM

## 2023-11-03 PROCEDURE — 99213 OFFICE O/P EST LOW 20 MIN: CPT | Mod: 25

## 2023-11-14 LAB
ALBUMIN SERPL ELPH-MCNC: 3.8 G/DL
ALP BLD-CCNC: 48 U/L
ALT SERPL-CCNC: 10 U/L
ANION GAP SERPL CALC-SCNC: 9 MMOL/L
AST SERPL-CCNC: 16 U/L
BASOPHILS # BLD AUTO: 0.03 K/UL
BASOPHILS NFR BLD AUTO: 0.5 %
BILIRUB SERPL-MCNC: 0.3 MG/DL
BUN SERPL-MCNC: 7 MG/DL
CALCIUM SERPL-MCNC: 9.4 MG/DL
CHLORIDE SERPL-SCNC: 104 MMOL/L
CO2 SERPL-SCNC: 25 MMOL/L
CREAT SERPL-MCNC: 0.64 MG/DL
EGFR: 122 ML/MIN/1.73M2
EOSINOPHIL # BLD AUTO: 0.07 K/UL
EOSINOPHIL NFR BLD AUTO: 1.1 %
GLUCOSE SERPL-MCNC: 87 MG/DL
HCT VFR BLD CALC: 33.2 %
HGB BLD-MCNC: 10.7 G/DL
IMM GRANULOCYTES NFR BLD AUTO: 0.5 %
LYMPHOCYTES # BLD AUTO: 2.67 K/UL
LYMPHOCYTES NFR BLD AUTO: 43.1 %
MAN DIFF?: NORMAL
MCHC RBC-ENTMCNC: 27.2 PG
MCHC RBC-ENTMCNC: 32.2 GM/DL
MCV RBC AUTO: 84.5 FL
MONOCYTES # BLD AUTO: 0.37 K/UL
MONOCYTES NFR BLD AUTO: 6 %
NEUTROPHILS # BLD AUTO: 3.02 K/UL
NEUTROPHILS NFR BLD AUTO: 48.8 %
PLATELET # BLD AUTO: 282 K/UL
POTASSIUM SERPL-SCNC: 3.9 MMOL/L
PROT SERPL-MCNC: 7.2 G/DL
RBC # BLD: 3.93 M/UL
RBC # FLD: 12.6 %
SODIUM SERPL-SCNC: 139 MMOL/L
TSH SERPL-ACNC: 0.66 UIU/ML
WBC # FLD AUTO: 6.19 K/UL

## 2023-12-27 ENCOUNTER — APPOINTMENT (OUTPATIENT)
Dept: OBGYN | Facility: CLINIC | Age: 28
End: 2023-12-27
Payer: MEDICAID

## 2023-12-27 ENCOUNTER — ASOB RESULT (OUTPATIENT)
Age: 28
End: 2023-12-27

## 2023-12-27 DIAGNOSIS — Z33.1 PREGNANT STATE, INCIDENTAL: ICD-10-CM

## 2023-12-27 PROCEDURE — 99213 OFFICE O/P EST LOW 20 MIN: CPT | Mod: TH,25

## 2023-12-27 PROCEDURE — 81025 URINE PREGNANCY TEST: CPT

## 2023-12-27 PROCEDURE — 76830 TRANSVAGINAL US NON-OB: CPT

## 2024-01-02 NOTE — PHYSICAL EXAM
[Chaperone Present] : A chaperone was present in the examining room during all aspects of the physical examination [Appropriately responsive] : appropriately responsive [Alert] : alert [No Acute Distress] : no acute distress [Examination Of The Breasts] : a normal appearance [No Masses] : no breast masses were palpable [Labia Majora] : normal [Labia Minora] : normal [Normal] : normal

## 2024-01-02 NOTE — HISTORY OF PRESENT ILLNESS
[FreeTextEntry1] : THONG DANIEL, 29 YO, G 1- presents for Delayed Menses & Annual visit.  LMP- 11/04/23 7w4d by LMP  Patient was seen on 12/18 in the ED with complaints of light vaginal spotting. s/p Rhogam on this date due to RH negative status.   UA + for LE==> currently on Keflex.  bhcg on 12/18 was 302.4  No Medication No family hx of breast cancer.  Patient visited [facility] to confirm pregnancy.

## 2024-01-02 NOTE — PLAN
[FreeTextEntry1] : 29yo  at CRL 6w3d   Annual exam: pap smear with cotesting, gonorrhea/chlamydia Follow up in 3 weeks for initial prenatal visit

## 2024-01-10 ENCOUNTER — APPOINTMENT (OUTPATIENT)
Dept: OBGYN | Facility: CLINIC | Age: 29
End: 2024-01-10

## 2024-01-10 ENCOUNTER — LABORATORY RESULT (OUTPATIENT)
Age: 29
End: 2024-01-10

## 2024-01-10 ENCOUNTER — APPOINTMENT (OUTPATIENT)
Dept: OBGYN | Facility: CLINIC | Age: 29
End: 2024-01-10
Payer: MEDICAID

## 2024-01-10 VITALS
OXYGEN SATURATION: 100 % | HEIGHT: 69 IN | SYSTOLIC BLOOD PRESSURE: 122 MMHG | TEMPERATURE: 97.2 F | WEIGHT: 140 LBS | DIASTOLIC BLOOD PRESSURE: 75 MMHG | RESPIRATION RATE: 16 BRPM | HEART RATE: 98 BPM | BODY MASS INDEX: 20.73 KG/M2

## 2024-01-10 PROCEDURE — 99213 OFFICE O/P EST LOW 20 MIN: CPT | Mod: TH,25

## 2024-01-15 LAB
ABO + RH PNL BLD: NORMAL
BACTERIA UR CULT: NORMAL
BASOPHILS # BLD AUTO: 0.05 K/UL
BASOPHILS NFR BLD AUTO: 0.6 %
BLD GP AB SCN SERPL QL: NORMAL
C TRACH RRNA SPEC QL NAA+PROBE: NOT DETECTED
CYTOLOGY CVX/VAG DOC THIN PREP: ABNORMAL
EOSINOPHIL # BLD AUTO: 0.11 K/UL
EOSINOPHIL NFR BLD AUTO: 1.3 %
ESTIMATED AVERAGE GLUCOSE: 103 MG/DL
HBA1C MFR BLD HPLC: 5.2 %
HBV SURFACE AG SER QL: NONREACTIVE
HCG SERPL-MCNC: ABNORMAL MIU/ML
HCG UR QL: POSITIVE
HCT VFR BLD CALC: 34.1 %
HCV AB SER QL: NONREACTIVE
HCV S/CO RATIO: 0.13 S/CO
HGB A MFR BLD: 97.1 %
HGB A2 MFR BLD: 2.4 %
HGB BLD-MCNC: 11.1 G/DL
HGB F MFR BLD: 0.5 %
HGB FRACT BLD-IMP: NORMAL
HIV1+2 AB SPEC QL IA.RAPID: NONREACTIVE
HPV HIGH+LOW RISK DNA PNL CVX: NOT DETECTED
IMM GRANULOCYTES NFR BLD AUTO: 0.3 %
LEAD BLD-MCNC: <1 UG/DL
LYMPHOCYTES # BLD AUTO: 2.97 K/UL
LYMPHOCYTES NFR BLD AUTO: 34.3 %
M TB IFN-G BLD-IMP: NEGATIVE
MAN DIFF?: NORMAL
MCHC RBC-ENTMCNC: 26.6 PG
MCHC RBC-ENTMCNC: 32.6 GM/DL
MCV RBC AUTO: 81.8 FL
MEV IGG FLD QL IA: 56.1 AU/ML
MEV IGG+IGM SER-IMP: POSITIVE
MONOCYTES # BLD AUTO: 0.73 K/UL
MONOCYTES NFR BLD AUTO: 8.4 %
N GONORRHOEA RRNA SPEC QL NAA+PROBE: NOT DETECTED
NEUTROPHILS # BLD AUTO: 4.78 K/UL
NEUTROPHILS NFR BLD AUTO: 55.1 %
PLATELET # BLD AUTO: 284 K/UL
QUALITY CONTROL: YES
QUANTIFERON TB PLUS MITOGEN MINUS NIL: >10 IU/ML
QUANTIFERON TB PLUS NIL: 0.02 IU/ML
QUANTIFERON TB PLUS TB1 MINUS NIL: 0 IU/ML
QUANTIFERON TB PLUS TB2 MINUS NIL: 0.01 IU/ML
RBC # BLD: 4.17 M/UL
RBC # FLD: 12.5 %
RUBV IGG FLD-ACNC: 5.1 INDEX
RUBV IGG SER-IMP: POSITIVE
SOURCE TP AMPLIFICATION: NORMAL
T PALLIDUM AB SER QL IA: NEGATIVE
T4 FREE SERPL-MCNC: 1.4 NG/DL
TSH SERPL-ACNC: 0.24 UIU/ML
VZV AB TITR SER: POSITIVE
VZV IGG SER IF-ACNC: 686.2 INDEX
WBC # FLD AUTO: 8.67 K/UL

## 2024-01-15 NOTE — PLAN
[FreeTextEntry1] :  27 YO,  7w4d by CRL  Initial prenatal labs drawn  Follow up in 3-4 weeks for NIPS and official dating US  Referral for NT given to patient to be scheduled after 10 weeks

## 2024-01-15 NOTE — PHYSICAL EXAM
[Chaperone Present] : A chaperone was present in the examining room during all aspects of the physical examination [Appropriately responsive] : appropriately responsive [Alert] : alert [No Acute Distress] : no acute distress [Normal] : normal external genitalia

## 2024-01-15 NOTE — HISTORY OF PRESENT ILLNESS
[FreeTextEntry1] : THONG DANIEL, 27 YO,   LMP- 23  Here for viability check. Patient reports feelings of nauseous without vomiting.

## 2024-01-17 LAB — FMR1 GENE MUT ANL BLD/T: NORMAL

## 2024-01-18 LAB
AR GENE MUT ANL BLD/T: NORMAL
CFTR MUT TESTED BLD/T: NEGATIVE

## 2024-01-24 ENCOUNTER — APPOINTMENT (OUTPATIENT)
Dept: OBGYN | Facility: CLINIC | Age: 29
End: 2024-01-24

## 2024-01-24 ENCOUNTER — APPOINTMENT (OUTPATIENT)
Dept: OBGYN | Facility: CLINIC | Age: 29
End: 2024-01-24
Payer: MEDICAID

## 2024-01-24 ENCOUNTER — ASOB RESULT (OUTPATIENT)
Age: 29
End: 2024-01-24

## 2024-01-24 DIAGNOSIS — Z34.90 ENCOUNTER FOR SUPERVISION OF NORMAL PREGNANCY, UNSPECIFIED, UNSPECIFIED TRIMESTER: ICD-10-CM

## 2024-01-24 PROCEDURE — 99213 OFFICE O/P EST LOW 20 MIN: CPT | Mod: TH

## 2024-01-29 LAB
BILIRUB UR QL STRIP: NEGATIVE
CHROMOSOME13 INTERPRETATION: NORMAL
CHROMOSOME13 TEST RESULT: NORMAL
CHROMOSOME18 INTERPRETATION: NORMAL
CHROMOSOME18 TEST RESULT: NORMAL
CHROMOSOME21 INTERPRETATION: NORMAL
CHROMOSOME21 TEST RESULT: NORMAL
CLARITY UR: CLEAR
COLLECTION METHOD: NORMAL
FETAL FRACTION: NORMAL
GLUCOSE UR-MCNC: NEGATIVE
HCG UR QL: 0.2 EU/DL
HGB UR QL STRIP.AUTO: NEGATIVE
KETONES UR-MCNC: NEGATIVE
LEUKOCYTE ESTERASE UR QL STRIP: NORMAL
NITRITE UR QL STRIP: NEGATIVE
PERFORMANCE AND LIMITATIONS: NORMAL
PH UR STRIP: 7
PROT UR STRIP-MCNC: NEGATIVE
SEX CHROMOSOME INTERPRETATION: NORMAL
SEX CHROMOSOME TEST RESULT: NORMAL
SP GR UR STRIP: 1.02
VERIFI PRENATAL TEST: NOT DETECTED

## 2024-01-30 NOTE — REVIEW OF SYSTEMS
Physical Therapy    Physical Therapy Treatment    Patient Name: Jennifer Terrell  MRN: [id number]  : 1991   Today's Date: 2024  Time Calculation  Start Time: 340  Stop Time: 423  Time Calculation (min): 43 min  Visit #2      Current Problem  1. Chronic left-sided low back pain with left-sided sciatica  Follow Up In Physical Therapy            Subjective   Pt states she has a little pain in her back and knee today        Precautions   STEADI Fall Risk Score (The score of 4 or more indicates an increased risk of falling): 9 ( fell in shower 24)        Pain   4/10 back, 3/10 L knee    Objective      Added exercises     Treatments:  EXERCISES       Date 24             VISIT# #2 # # #    REPS REPS REPS REPS          Nustep L2 10 min              Shuttle   DLP 3B 2 x10      Shuttle   SLP        2 B 2 x 10 each      Shuttle   TR/HR                     Tband   Lat Pulls Green 2x 10      Tband   Chops Green 2x 10      Tband   Mid Row Green 2x 10      Tband   Mower starts              SB  DKTC 3 sec x20      SB   LTR 5 sec x10 each              Prone       Prone Prop       Prone Press up              PPT       PPT with hip add/abd       PPT with marches                     Stretches       Calf stretch  3x30 sec      Piriformis stretch                  Assessment:  Pt reports Fe she has a chiropractor appt. Pt reports no increased pain with added exercises. Pt states she has been doing HEP every other day currently and is going to try to do them daily.       Plan:   Cont with stretching and strengthening to decrease pain.     OP EDUCATION:       Goals:  Active       PT Problem       PT Goal        Start:  24    Expected End:  02/15/24         1 Patient to be independent with home exercises within pain free range to stretch Lower extremities and to strengthen core abdominals and core stabilization to increase mobility  2 Patient to demonstrate understanding of what it means to have neutral posture  alignment, the use of positioning strategies and body mechanics principles to reduce pain with everyday activities.  3 Patient to gain the functional range of motion necessary in the  lumbar spine to perform activities of daily living with increased ease.    Long term goals ( 4 week)  1 Patient to have reduced pain by 50% or more for increased function and mobility  2 Patient to demonstrate 1/3 MMT strength gain or more in targeted muscle groups and core strength for increased overall mobility  3 Patient to demonstrate a 4 point or more change in Oswestry to indicate reduced impairment with every day living   4 Patient to return to community mobility and household chores and job related activity with pain levels managed using increased awareness of engaging core stabilization and strategies utilized to manage pain.            PT Goal        Start:  01/25/24    Expected End:  03/07/24       Long term goals   1 Patient to have reduced pain by 50% or more for increased function and mobility  2 Patient to demonstrate 1/3 MMT strength gain or more in targeted muscle groups and core strength for increased overall mobility  3 Patient to demonstrate a 4 point or more change in Oswestry to indicate reduced impairment with every day living   4 Patient to return to community mobility and household chores and job related activity with pain levels managed using increased awareness of engaging core stabilization and strategies utilized to manage pain.                   .   [Negative] : Heme/Lymph

## 2024-01-31 ENCOUNTER — APPOINTMENT (OUTPATIENT)
Dept: OBGYN | Facility: CLINIC | Age: 29
End: 2024-01-31

## 2024-02-02 ENCOUNTER — RESULT REVIEW (OUTPATIENT)
Age: 29
End: 2024-02-02

## 2024-02-06 ENCOUNTER — LABORATORY RESULT (OUTPATIENT)
Age: 29
End: 2024-02-06

## 2024-02-06 ENCOUNTER — APPOINTMENT (OUTPATIENT)
Dept: INTERNAL MEDICINE | Facility: CLINIC | Age: 29
End: 2024-02-06
Payer: MEDICAID

## 2024-02-06 VITALS
RESPIRATION RATE: 16 BRPM | TEMPERATURE: 97.9 F | OXYGEN SATURATION: 99 % | SYSTOLIC BLOOD PRESSURE: 112 MMHG | DIASTOLIC BLOOD PRESSURE: 69 MMHG | WEIGHT: 139 LBS | BODY MASS INDEX: 20.59 KG/M2 | HEART RATE: 77 BPM | HEIGHT: 69 IN

## 2024-02-06 DIAGNOSIS — L29.2 PRURITUS VULVAE: ICD-10-CM

## 2024-02-06 DIAGNOSIS — O26.891 OTHER SPECIFIED PREGNANCY RELATED CONDITIONS, FIRST TRIMESTER: ICD-10-CM

## 2024-02-06 DIAGNOSIS — N89.8 OTHER SPECIFIED PREGNANCY RELATED CONDITIONS, FIRST TRIMESTER: ICD-10-CM

## 2024-02-06 PROCEDURE — 99213 OFFICE O/P EST LOW 20 MIN: CPT | Mod: 25

## 2024-02-07 ENCOUNTER — NON-APPOINTMENT (OUTPATIENT)
Age: 29
End: 2024-02-07

## 2024-02-07 PROBLEM — L29.2 ITCHING OF VULVA: Status: ACTIVE | Noted: 2021-04-15

## 2024-02-07 NOTE — HISTORY OF PRESENT ILLNESS
[FreeTextEntry8] : Approx 3 months pregnant, +Nauseous , Throwing up, naps alot.  In School ,.+Sexually active, +Vaginal Discharge, Cream colored/ Off white, No significant Odor detected, H/O BV in past. +Itching to Vaginal area . Could not get Appt with OB till next week, thus came  to see  her PCP.

## 2024-02-07 NOTE — REVIEW OF SYSTEMS
[Abdominal Pain] : no abdominal pain [Nausea] : nausea [Constipation] : no constipation [Diarrhea] : diarrhea [Vomiting] : no vomiting [Heartburn] : no heartburn [Melena] : no melena [Dysuria] : no dysuria [Incontinence] : no incontinence [Nocturia] : no nocturia [Poor Libido] : libido not poor [Hematuria] : no hematuria [Frequency] : frequency [Vaginal Discharge] : vaginal discharge [Dysmenorrhea] : no dysmenorrhea [Negative] : Psychiatric

## 2024-02-07 NOTE — HEALTH RISK ASSESSMENT
[No] : In the past 12 months have you used drugs other than those required for medical reasons? No [No falls in past year] : Patient reported no falls in the past year [0] : 2) Feeling down, depressed, or hopeless: Not at all (0) [Never] : Never [de-identified] : None [de-identified] : OB [Audit-CScore] : 0 [de-identified] : no [de-identified] : regular [[id number]] : 0

## 2024-02-07 NOTE — PHYSICAL EXAM
[Urethral Meatus] : normal urethra [Normal] : affect was normal and insight and judgment were intact [FreeTextEntry1] : Whitish  clumpy cottage cheese looking discharge , no odor detected ( Culture Taken)

## 2024-02-07 NOTE — ASSESSMENT
[FreeTextEntry1] : 28 yr old presents  Vaginal Discharge- Probable Candida  Advised to use Clotrimazole Locally Culture taken and sent to lab  Vaginal Itching- Avoid tight clothing avoid scratching with  hands Use Cream locally  Will call with results  F/U OB as scheduled

## 2024-02-10 ENCOUNTER — EMERGENCY (EMERGENCY)
Facility: HOSPITAL | Age: 29
LOS: 1 days | Discharge: HOME | End: 2024-02-10
Attending: EMERGENCY MEDICINE
Payer: MEDICAID

## 2024-02-10 VITALS
HEIGHT: 69.69 IN | WEIGHT: 143.3 LBS | DIASTOLIC BLOOD PRESSURE: 67 MMHG | SYSTOLIC BLOOD PRESSURE: 116 MMHG | RESPIRATION RATE: 16 BRPM | OXYGEN SATURATION: 99 % | HEART RATE: 84 BPM | TEMPERATURE: 98 F

## 2024-02-10 DIAGNOSIS — Z98.890 OTHER SPECIFIED POSTPROCEDURAL STATES: Chronic | ICD-10-CM

## 2024-02-10 PROCEDURE — 99284 EMERGENCY DEPT VISIT MOD MDM: CPT

## 2024-02-10 PROCEDURE — 76801 OB US < 14 WKS SINGLE FETUS: CPT

## 2024-02-10 PROCEDURE — 76801 OB US < 14 WKS SINGLE FETUS: CPT | Mod: 26

## 2024-02-10 PROCEDURE — 99284 EMERGENCY DEPT VISIT MOD MDM: CPT | Mod: 25

## 2024-02-10 NOTE — ED ADULT NURSE NOTE - NSFALLUNIVINTERV_ED_ALL_ED
Bed/Stretcher in lowest position, wheels locked, appropriate side rails in place/Call bell, personal items and telephone in reach/Instruct patient to call for assistance before getting out of bed/chair/stretcher/Non-slip footwear applied when patient is off stretcher/[place] to call system/Physically safe environment - no spills, clutter or unnecessary equipment/Purposeful proactive rounding/Room/bathroom lighting operational, light cord in reach

## 2024-02-10 NOTE — ED PROVIDER NOTE - PATIENT PORTAL LINK FT
You can access the FollowMyHealth Patient Portal offered by [facility] by registering at the following website: [email or web address]. By joining [employer]’s FollowMyHealth portal, you will also be able to view your health information using other applications (apps) compatible with our system.

## 2024-02-10 NOTE — ED ADULT NURSE NOTE - OBJECTIVE STATEMENT
Patient presented to ED due to vaginal spotting yesterday but denies spotting today and presence of discomfort, 12 weeks pregnant.

## 2024-02-10 NOTE — ED PROVIDER NOTE - OBJECTIVE STATEMENT
28-year-old female comes in complaining of vaginal bleeding that occurred yesterday.  She was seen at another hospital and was told that she had an open os patient comes to emergency department to be reevaluated and seen by OB/GYN.  Currently patient is not having any bleeding and no pain estimated gestational age is about 12 weeks.  Patient did not get an ultrasound and wants to make sure everything is okay.  Beta-hCG was 119,000 yesterday.

## 2024-02-10 NOTE — ED PROVIDER NOTE - GASTROINTESTINAL, MLM
Additional Notes: Patient consent was obtained to proceed with the visit and recommended plan of care after discussion of all risks and benefits, including the risks of COVID-19 exposure. Detail Level: Simple Render Risk Assessment In Note?: no Abdomen soft, non-tender, no guarding.

## 2024-02-10 NOTE — ED PROVIDER NOTE - CLINICAL SUMMARY MEDICAL DECISION MAKING FREE TEXT BOX
28-year-old female comes in complaining of vaginal bleeding that occurred yesterday.  She was seen at another hospital and was told that she had an open os patient comes to emergency department to be reevaluated and seen by OB/GYN.  Currently patient is not having any bleeding and no pain estimated gestational age is about 12 weeks.  Patient did not get an ultrasound and wants to make sure everything is okay.  Beta-hCG was 119,000 yesterday.  Patient is in no acute distress.  Abdomen is soft nontender.  No CVA tenderness.  Plan: Ultrasound

## 2024-02-12 ENCOUNTER — APPOINTMENT (OUTPATIENT)
Dept: ANTEPARTUM | Facility: CLINIC | Age: 29
End: 2024-02-12
Payer: MEDICAID

## 2024-02-12 ENCOUNTER — ASOB RESULT (OUTPATIENT)
Age: 29
End: 2024-02-12

## 2024-02-12 PROCEDURE — 76813 OB US NUCHAL MEAS 1 GEST: CPT

## 2024-02-12 PROCEDURE — 76801 OB US < 14 WKS SINGLE FETUS: CPT | Mod: 59

## 2024-02-20 ENCOUNTER — APPOINTMENT (OUTPATIENT)
Dept: OBGYN | Facility: CLINIC | Age: 29
End: 2024-02-20
Payer: MEDICAID

## 2024-02-20 VITALS
BODY MASS INDEX: 21.18 KG/M2 | RESPIRATION RATE: 16 BRPM | TEMPERATURE: 98.2 F | WEIGHT: 143 LBS | HEIGHT: 69 IN | HEART RATE: 91 BPM | OXYGEN SATURATION: 100 % | SYSTOLIC BLOOD PRESSURE: 115 MMHG | DIASTOLIC BLOOD PRESSURE: 72 MMHG

## 2024-02-20 DIAGNOSIS — R30.0 DYSURIA: ICD-10-CM

## 2024-02-20 DIAGNOSIS — O21.9 VOMITING OF PREGNANCY, UNSPECIFIED: ICD-10-CM

## 2024-02-20 PROCEDURE — 99213 OFFICE O/P EST LOW 20 MIN: CPT

## 2024-02-20 RX ORDER — PRENATAL VIT NO.130/IRON/FOLIC 27MG-0.8MG
27-0.8 TABLET ORAL
Qty: 30 | Refills: 8 | Status: ACTIVE | COMMUNITY
Start: 2024-02-20 | End: 1900-01-01

## 2024-02-25 LAB
BACTERIA UR CULT: ABNORMAL
BILIRUB UR QL STRIP: NEGATIVE
CLARITY UR: CLEAR
COLLECTION METHOD: NORMAL
GLUCOSE UR-MCNC: NEGATIVE
HCG UR QL: 0.2 EU/DL
HGB UR QL STRIP.AUTO: NORMAL
KETONES UR-MCNC: NEGATIVE
LEUKOCYTE ESTERASE UR QL STRIP: NORMAL
NITRITE UR QL STRIP: NEGATIVE
PH UR STRIP: 6.5
PROT UR STRIP-MCNC: NEGATIVE
SP GR UR STRIP: 1.02

## 2024-03-15 ENCOUNTER — APPOINTMENT (OUTPATIENT)
Dept: ANTEPARTUM | Facility: CLINIC | Age: 29
End: 2024-03-15
Payer: MEDICAID

## 2024-03-15 ENCOUNTER — ASOB RESULT (OUTPATIENT)
Age: 29
End: 2024-03-15

## 2024-03-15 DIAGNOSIS — O26.872 CERVICAL SHORTENING, SECOND TRIMESTER: ICD-10-CM

## 2024-03-15 PROCEDURE — 76817 TRANSVAGINAL US OBSTETRIC: CPT | Mod: 59

## 2024-03-15 PROCEDURE — 76805 OB US >/= 14 WKS SNGL FETUS: CPT

## 2024-03-15 PROCEDURE — 99202 OFFICE O/P NEW SF 15 MIN: CPT | Mod: TH,25

## 2024-03-15 RX ORDER — PROGESTERONE 200 MG/1
200 CAPSULE ORAL
Qty: 30 | Refills: 2 | Status: ACTIVE | COMMUNITY
Start: 2024-03-15 | End: 1900-01-01

## 2024-03-21 ENCOUNTER — APPOINTMENT (OUTPATIENT)
Dept: OBGYN | Facility: CLINIC | Age: 29
End: 2024-03-21
Payer: MEDICAID

## 2024-03-21 VITALS
HEART RATE: 80 BPM | OXYGEN SATURATION: 98 % | SYSTOLIC BLOOD PRESSURE: 116 MMHG | HEIGHT: 69 IN | WEIGHT: 147 LBS | DIASTOLIC BLOOD PRESSURE: 78 MMHG | BODY MASS INDEX: 21.77 KG/M2 | RESPIRATION RATE: 16 BRPM | TEMPERATURE: 97.9 F

## 2024-03-21 PROCEDURE — 99213 OFFICE O/P EST LOW 20 MIN: CPT | Mod: TH,25

## 2024-03-25 ENCOUNTER — RESULT REVIEW (OUTPATIENT)
Age: 29
End: 2024-03-25

## 2024-03-25 ENCOUNTER — APPOINTMENT (OUTPATIENT)
Dept: ULTRASOUND IMAGING | Facility: IMAGING CENTER | Age: 29
End: 2024-03-25
Payer: MEDICAID

## 2024-03-25 ENCOUNTER — OUTPATIENT (OUTPATIENT)
Dept: OUTPATIENT SERVICES | Facility: HOSPITAL | Age: 29
LOS: 1 days | End: 2024-03-25
Payer: MEDICAID

## 2024-03-25 DIAGNOSIS — Z00.8 ENCOUNTER FOR OTHER GENERAL EXAMINATION: ICD-10-CM

## 2024-03-25 DIAGNOSIS — Z98.890 OTHER SPECIFIED POSTPROCEDURAL STATES: Chronic | ICD-10-CM

## 2024-03-25 LAB
BILIRUB UR QL STRIP: NEGATIVE
CLARITY UR: CLEAR
COLLECTION METHOD: NORMAL
GLUCOSE UR-MCNC: NEGATIVE
HCG UR QL: 0.2 EU/DL
HGB UR QL STRIP.AUTO: NEGATIVE
KETONES UR-MCNC: NEGATIVE
LEUKOCYTE ESTERASE UR QL STRIP: NORMAL
NITRITE UR QL STRIP: NEGATIVE
PH UR STRIP: 8.5
PROT UR STRIP-MCNC: NEGATIVE
SP GR UR STRIP: 1.01

## 2024-03-25 PROCEDURE — 76642 ULTRASOUND BREAST LIMITED: CPT

## 2024-03-25 PROCEDURE — 76642 ULTRASOUND BREAST LIMITED: CPT | Mod: 26,50

## 2024-03-26 ENCOUNTER — APPOINTMENT (OUTPATIENT)
Dept: SURGICAL ONCOLOGY | Facility: CLINIC | Age: 29
End: 2024-03-26
Payer: MEDICAID

## 2024-03-26 VITALS
BODY MASS INDEX: 22.07 KG/M2 | OXYGEN SATURATION: 99 % | SYSTOLIC BLOOD PRESSURE: 120 MMHG | HEIGHT: 69 IN | DIASTOLIC BLOOD PRESSURE: 68 MMHG | WEIGHT: 149 LBS | HEART RATE: 74 BPM

## 2024-03-26 DIAGNOSIS — D24.2 BENIGN NEOPLASM OF LEFT BREAST: ICD-10-CM

## 2024-03-26 PROCEDURE — 19120 REMOVAL OF BREAST LESION: CPT

## 2024-03-26 PROCEDURE — 99214 OFFICE O/P EST MOD 30 MIN: CPT | Mod: 25

## 2024-03-28 ENCOUNTER — APPOINTMENT (OUTPATIENT)
Dept: ANTEPARTUM | Facility: CLINIC | Age: 29
End: 2024-03-28
Payer: MEDICAID

## 2024-03-28 ENCOUNTER — ASOB RESULT (OUTPATIENT)
Age: 29
End: 2024-03-28

## 2024-03-28 LAB
AFP MOM: 1.14
AFP VALUE: 58.5 NG/ML
ALPHA FETOPROTEIN SERUM COMMENT: NORMAL
ALPHA FETOPROTEIN SERUM INTERPRETATION: NORMAL
ALPHA FETOPROTEIN SERUM RESULTS: NORMAL
ALPHA FETOPROTEIN SERUM TEST RESULTS: NORMAL
GESTATIONAL AGE BASED ON: NORMAL
GESTATIONAL AGE ON COLLECTION DATE: 18.3 WEEKS
INSULIN DEP DIABETES: NO
MATERNAL AGE AT EDD AFP: 30.8 YR
MULTIPLE GESTATION: NO
OSBR RISK 1 IN: NORMAL
RACE: NORMAL
WEIGHT AFP: 147 LBS

## 2024-03-28 PROCEDURE — 76811 OB US DETAILED SNGL FETUS: CPT

## 2024-03-28 PROCEDURE — 76817 TRANSVAGINAL US OBSTETRIC: CPT | Mod: 59

## 2024-03-29 LAB — CORE LAB BIOPSY: NORMAL

## 2024-04-02 ENCOUNTER — APPOINTMENT (OUTPATIENT)
Dept: SURGICAL ONCOLOGY | Facility: CLINIC | Age: 29
End: 2024-04-02
Payer: MEDICAID

## 2024-04-02 VITALS
DIASTOLIC BLOOD PRESSURE: 66 MMHG | HEIGHT: 69 IN | OXYGEN SATURATION: 99 % | WEIGHT: 149 LBS | SYSTOLIC BLOOD PRESSURE: 124 MMHG | BODY MASS INDEX: 22.07 KG/M2 | HEART RATE: 84 BPM

## 2024-04-02 PROCEDURE — 99024 POSTOP FOLLOW-UP VISIT: CPT

## 2024-04-02 NOTE — HISTORY OF PRESENT ILLNESS
[de-identified] : THONG DANIEL is a 28-year F at 19 weeks' gestation w/ enlarging left 3:00-RA bx-proven fibroadenoma s/p left mass excision on 3/26/24. Here to discuss pathology results.   3/9/23 Patient presented for initial consult. Reported being able to palpable breast lumps while menstruating but recently noticed left breast lumps enlarging in size in November-2022. She went to her PCP for these findings.  Denies any nipple discharge or skin changes.  3/26/24 s/p in-office left breast 3:00 mass excision.  24 Denies any fevers or chills. Steri-strips in place over left noah-areolar incision.  Reports minimal pain if any.    PMHx: Denies  PSHx: L eft Breast lumpectomy  - Fibroadenoma removal ([facility]) Meds: Vitamin D  NKDA Family Hx: No hx of breast cancer or ovarian cancer. Hodgkin's lymphoma (maternal 1st cousin).   GYN: , menarche age 13, LMP 2/15/23. Bra size: 32 B  Smoking or ETOH use:  Caffeine: former Coffee drinker--> Now drinks Mud water, No soda

## 2024-04-02 NOTE — RESULTS/DATA
[FreeTextEntry1] : BREAST PATH/RAD REVIEW 2/17/23 L DX US: BIRADs 4 - L 3:00-RA 13 mm (palp) solid appearing nodule/mass with intraductal extension - (Rec USGBx) - L 12N1 7 mm anterior complicated septated cyst, L 2N2 8 mm (palp) oval shaped complicated solid cystic oval shaped nodule, L 3N2 5 mm hypoechoic complicated cyst vs homogeneous solid nodule (Rec 6-month f/u L Dx US) - L axilla w/ benign appearing LN  2/21/2023 BL Dx MG/ R Dx US: BIRADs 4, Extremely dense. - L middle -third lateral aspect palpable area w/ No mammographic findings - R 7N4 8 mm hypoechoic nodule, consider fibroadenoma-> Rec 6-month f/u R dx US -  R axilla w/ 11 mm benign appearing LN  ~~~ rec L USGBX of L 3:00 nodule not seen on MG 2/2 extremely dense breast, 6-month f/u L Dx US for prev ^described findings)    3/03/23 Left [3:00-RA] US core Bx: Fibroadenoma. Benign and concordant. Ribbon clip (Rec 6-month f/u BL Dx US)   3/25/24 BL Dx US: BIRADs 3.  - L 3:00-RA 2.3 cm (previously 1.1 cm) bx-proven FA w/ interval enlargement (Rec surg c/s) - R 7N4 0.7 cm (previously 0.8>0.6 cm) circumscribed hypoechoic mass overall minimally changed (Rec 6-month f/u R Dx US)   3/26/24 s/p Left mass excision.   Path (Left 3:00): Fibroadenoma.

## 2024-04-02 NOTE — PHYSICAL EXAM
[Normal] : normal breast inspection and palpation of axillas [Normal Supraclavicular Lymph Nodes] : normal supraclavicular lymph nodes [Normal Neck Lymph Nodes] : normal neck lymph nodes  [Normal Axillary Lymph Nodes] : normal axillary lymph nodes [Normal] : normal appearance, no rash, nodules, vesicles, ulcers, erythema [de-identified] : Left breast surgical incision is c/d/i, well-approximated, no hematoma/seroma, no evidence of infection, no parenchymal defect.  No palpable mass in either breast.  No clinical lymphadenopathy.  [de-identified] : Normal respiratory effort

## 2024-04-02 NOTE — ASSESSMENT
[FreeTextEntry1] : THONG DANIEL is a 28-year F at 20 weeks' gestation w/ enlarging left 3:00-RA bx-proven fibroadenoma s/p left mass excision on 3/26/24. Here to discuss pathology results.   She is recovering well after surgery with no complaints of complications.   We discussed her surgical Pathology with confirmed left 3:00 fibroadenoma.   Next imagin-month f/u Right Dx US due 2024 to further evaluate 7N4 mass.  RTO in 2024 after imaging. She knows to return sooner if any breast imaging abnormalities or if any breast issues/concerns arise.

## 2024-04-11 ENCOUNTER — ASOB RESULT (OUTPATIENT)
Age: 29
End: 2024-04-11

## 2024-04-11 ENCOUNTER — APPOINTMENT (OUTPATIENT)
Dept: ANTEPARTUM | Facility: CLINIC | Age: 29
End: 2024-04-11
Payer: MEDICAID

## 2024-04-11 PROCEDURE — 76816 OB US FOLLOW-UP PER FETUS: CPT

## 2024-04-11 PROCEDURE — 76817 TRANSVAGINAL US OBSTETRIC: CPT | Mod: 59

## 2024-04-18 ENCOUNTER — NON-APPOINTMENT (OUTPATIENT)
Age: 29
End: 2024-04-18

## 2024-04-18 ENCOUNTER — APPOINTMENT (OUTPATIENT)
Dept: OBGYN | Facility: CLINIC | Age: 29
End: 2024-04-18
Payer: MEDICAID

## 2024-04-18 VITALS
TEMPERATURE: 97.7 F | RESPIRATION RATE: 16 BRPM | HEART RATE: 93 BPM | OXYGEN SATURATION: 100 % | SYSTOLIC BLOOD PRESSURE: 118 MMHG | HEIGHT: 69 IN | DIASTOLIC BLOOD PRESSURE: 77 MMHG | WEIGHT: 155 LBS | BODY MASS INDEX: 22.96 KG/M2

## 2024-04-18 PROCEDURE — 99213 OFFICE O/P EST LOW 20 MIN: CPT | Mod: TH

## 2024-04-23 LAB
BILIRUB UR QL STRIP: NEGATIVE
CLARITY UR: CLEAR
COLLECTION METHOD: NORMAL
GLUCOSE UR-MCNC: NEGATIVE
HCG UR QL: 0.2 EU/DL
HGB UR QL STRIP.AUTO: NEGATIVE
KETONES UR-MCNC: NEGATIVE
LEUKOCYTE ESTERASE UR QL STRIP: NORMAL
NITRITE UR QL STRIP: NEGATIVE
PH UR STRIP: 7
PROT UR STRIP-MCNC: NEGATIVE
SP GR UR STRIP: 1.02

## 2024-04-25 ENCOUNTER — APPOINTMENT (OUTPATIENT)
Dept: ANTEPARTUM | Facility: CLINIC | Age: 29
End: 2024-04-25
Payer: MEDICAID

## 2024-04-25 PROCEDURE — 76817 TRANSVAGINAL US OBSTETRIC: CPT

## 2024-04-25 PROCEDURE — 76816 OB US FOLLOW-UP PER FETUS: CPT

## 2024-05-16 ENCOUNTER — APPOINTMENT (OUTPATIENT)
Dept: OBGYN | Facility: CLINIC | Age: 29
End: 2024-05-16
Payer: MEDICAID

## 2024-05-16 ENCOUNTER — NON-APPOINTMENT (OUTPATIENT)
Age: 29
End: 2024-05-16

## 2024-05-16 VITALS
HEIGHT: 69 IN | WEIGHT: 161 LBS | RESPIRATION RATE: 16 BRPM | TEMPERATURE: 97.8 F | SYSTOLIC BLOOD PRESSURE: 114 MMHG | HEART RATE: 81 BPM | BODY MASS INDEX: 23.85 KG/M2 | DIASTOLIC BLOOD PRESSURE: 74 MMHG | OXYGEN SATURATION: 99 %

## 2024-05-16 DIAGNOSIS — N89.8 OTHER SPECIFIED PREGNANCY RELATED CONDITIONS, UNSPECIFIED TRIMESTER: ICD-10-CM

## 2024-05-16 DIAGNOSIS — Z34.92 ENCOUNTER FOR SUPERVISION OF NORMAL PREGNANCY, UNSPECIFIED, SECOND TRIMESTER: ICD-10-CM

## 2024-05-16 DIAGNOSIS — Z67.91 OTHER SPECIFIED PREGNANCY RELATED CONDITIONS, UNSPECIFIED TRIMESTER: ICD-10-CM

## 2024-05-16 DIAGNOSIS — O26.899 OTHER SPECIFIED PREGNANCY RELATED CONDITIONS, UNSPECIFIED TRIMESTER: ICD-10-CM

## 2024-05-16 PROCEDURE — 99213 OFFICE O/P EST LOW 20 MIN: CPT | Mod: TH

## 2024-05-17 DIAGNOSIS — O99.012 ANEMIA COMPLICATING PREGNANCY, SECOND TRIMESTER: ICD-10-CM

## 2024-05-18 DIAGNOSIS — R82.71 BACTERIURIA: ICD-10-CM

## 2024-05-18 LAB
BACTERIA UR CULT: ABNORMAL
BASOPHILS # BLD AUTO: 0.02 K/UL
BASOPHILS NFR BLD AUTO: 0.2 %
BILIRUB UR QL STRIP: NEGATIVE
CLARITY UR: CLEAR
COLLECTION METHOD: NORMAL
EOSINOPHIL # BLD AUTO: 0.09 K/UL
EOSINOPHIL NFR BLD AUTO: 0.9 %
GLUCOSE 1H P 100 G GLC PO SERPL-MCNC: 112 MG/DL
GLUCOSE UR-MCNC: NORMAL
HCG UR QL: 0.2 EU/DL
HCT VFR BLD CALC: 32.2 %
HGB BLD-MCNC: 10.4 G/DL
HGB UR QL STRIP.AUTO: NEGATIVE
IMM GRANULOCYTES NFR BLD AUTO: 2.1 %
KETONES UR-MCNC: NEGATIVE
LEUKOCYTE ESTERASE UR QL STRIP: NORMAL
LYMPHOCYTES # BLD AUTO: 2.13 K/UL
LYMPHOCYTES NFR BLD AUTO: 21.1 %
MAN DIFF?: NORMAL
MCHC RBC-ENTMCNC: 28.7 PG
MCHC RBC-ENTMCNC: 32.3 GM/DL
MCV RBC AUTO: 89 FL
MONOCYTES # BLD AUTO: 0.61 K/UL
MONOCYTES NFR BLD AUTO: 6.1 %
NEUTROPHILS # BLD AUTO: 7.02 K/UL
NEUTROPHILS NFR BLD AUTO: 69.6 %
NITRITE UR QL STRIP: NEGATIVE
PH UR STRIP: 7
PLATELET # BLD AUTO: 202 K/UL
PROT UR STRIP-MCNC: NEGATIVE
RBC # BLD: 3.62 M/UL
RBC # FLD: 13.6 %
SP GR UR STRIP: 1.01
WBC # FLD AUTO: 10.08 K/UL

## 2024-05-22 ENCOUNTER — APPOINTMENT (OUTPATIENT)
Dept: ANTEPARTUM | Facility: CLINIC | Age: 29
End: 2024-05-22
Payer: MEDICAID

## 2024-05-22 ENCOUNTER — ASOB RESULT (OUTPATIENT)
Age: 29
End: 2024-05-22

## 2024-05-22 PROCEDURE — 76816 OB US FOLLOW-UP PER FETUS: CPT

## 2024-05-28 ENCOUNTER — NON-APPOINTMENT (OUTPATIENT)
Age: 29
End: 2024-05-28

## 2024-05-29 ENCOUNTER — APPOINTMENT (OUTPATIENT)
Dept: OBGYN | Facility: CLINIC | Age: 29
End: 2024-05-29

## 2024-05-29 ENCOUNTER — NON-APPOINTMENT (OUTPATIENT)
Age: 29
End: 2024-05-29

## 2024-05-30 DIAGNOSIS — R00.2 PALPITATIONS: ICD-10-CM

## 2024-05-30 RX ORDER — PRENATAL VIT NO.130/IRON/FOLIC 27MG-0.8MG
28-0.8 TABLET ORAL
Qty: 30 | Refills: 11 | Status: DISCONTINUED | COMMUNITY
Start: 2023-12-27 | End: 2024-05-30

## 2024-05-30 RX ORDER — HYDROCORTISONE 25 MG/G
2.5 CREAM TOPICAL
Qty: 1 | Refills: 0 | Status: DISCONTINUED | COMMUNITY
Start: 2022-12-05 | End: 2024-05-30

## 2024-05-30 RX ORDER — AMOXICILLIN AND CLAVULANATE POTASSIUM 500; 125 MG/1; MG/1
500-125 TABLET, FILM COATED ORAL
Qty: 14 | Refills: 0 | Status: DISCONTINUED | COMMUNITY
Start: 2023-11-03 | End: 2024-05-30

## 2024-05-30 RX ORDER — METRONIDAZOLE 7.5 MG/G
0.75 GEL VAGINAL
Qty: 1 | Refills: 0 | Status: DISCONTINUED | COMMUNITY
Start: 2021-06-07 | End: 2024-05-30

## 2024-05-30 RX ORDER — HUMAN RHO(D) IMMUNE GLOBULIN 1500 [IU]/2ML
1500 SOLUTION INTRAMUSCULAR; INTRAVENOUS
Qty: 1 | Refills: 0 | Status: DISCONTINUED | COMMUNITY
Start: 2024-05-16 | End: 2024-05-30

## 2024-05-30 RX ORDER — ACYCLOVIR 400 MG/1
400 TABLET ORAL TWICE DAILY
Qty: 60 | Refills: 6 | Status: DISCONTINUED | COMMUNITY
Start: 2021-04-15 | End: 2024-05-30

## 2024-05-30 RX ORDER — CLOTRIMAZOLE 10 MG/G
1 CREAM TOPICAL 3 TIMES DAILY
Qty: 1 | Refills: 1 | Status: DISCONTINUED | COMMUNITY
Start: 2024-01-10 | End: 2024-05-30

## 2024-05-30 RX ORDER — PYRIDOXINE HCL (VITAMIN B6) 25 MG
25 TABLET ORAL
Qty: 90 | Refills: 1 | Status: DISCONTINUED | COMMUNITY
Start: 2024-02-20 | End: 2024-05-30

## 2024-05-30 RX ORDER — ONDANSETRON 4 MG/1
4 TABLET, ORALLY DISINTEGRATING ORAL 3 TIMES DAILY
Qty: 60 | Refills: 1 | Status: DISCONTINUED | COMMUNITY
Start: 2024-02-20 | End: 2024-05-30

## 2024-05-30 RX ORDER — CEPHALEXIN 500 MG/1
500 CAPSULE ORAL
Qty: 14 | Refills: 0 | Status: DISCONTINUED | COMMUNITY
Start: 2024-05-18 | End: 2024-05-30

## 2024-05-30 RX ORDER — TERCONAZOLE 8 MG/G
0.8 CREAM VAGINAL
Qty: 1 | Refills: 0 | Status: DISCONTINUED | COMMUNITY
Start: 2024-05-16 | End: 2024-05-30

## 2024-05-30 RX ORDER — DOXYLAMINE SUCCINATE AND PYRIDOXINE HYDROCHLORIDE 10; 10 MG/1; MG/1
10-10 TABLET, DELAYED RELEASE ORAL
Qty: 90 | Refills: 2 | Status: DISCONTINUED | COMMUNITY
Start: 2024-01-10 | End: 2024-05-30

## 2024-05-30 RX ORDER — DOXYLAMINE SUCCINATE 25 MG
25 TABLET ORAL
Qty: 15 | Refills: 1 | Status: DISCONTINUED | COMMUNITY
Start: 2024-02-20 | End: 2024-05-30

## 2024-05-30 RX ORDER — AMPICILLIN 500 MG/1
500 CAPSULE ORAL
Qty: 21 | Refills: 0 | Status: DISCONTINUED | COMMUNITY
Start: 2024-02-20 | End: 2024-05-30

## 2024-05-30 RX ORDER — METRONIDAZOLE 7.5 MG/G
0.75 GEL VAGINAL AS DIRECTED
Qty: 1 | Refills: 6 | Status: DISCONTINUED | COMMUNITY
Start: 2021-06-07 | End: 2024-05-30

## 2024-05-30 RX ORDER — NORETHINDRONE ACETATE AND ETHINYL ESTRADIOL 1MG-20(21)
1-20 KIT ORAL
Qty: 84 | Refills: 0 | Status: DISCONTINUED | COMMUNITY
Start: 2020-06-12 | End: 2024-05-30

## 2024-05-30 RX ORDER — PRENATAL VIT NO.130/IRON/FOLIC 27MG-0.8MG
28-0.8 TABLET ORAL
Qty: 30 | Refills: 11 | Status: DISCONTINUED | COMMUNITY
Start: 2024-01-10 | End: 2024-05-30

## 2024-06-03 ENCOUNTER — APPOINTMENT (OUTPATIENT)
Dept: OBGYN | Facility: CLINIC | Age: 29
End: 2024-06-03
Payer: MEDICAID

## 2024-06-03 VITALS
TEMPERATURE: 97.2 F | HEART RATE: 97 BPM | HEIGHT: 69 IN | SYSTOLIC BLOOD PRESSURE: 118 MMHG | OXYGEN SATURATION: 98 % | WEIGHT: 166 LBS | DIASTOLIC BLOOD PRESSURE: 70 MMHG | RESPIRATION RATE: 16 BRPM | BODY MASS INDEX: 24.59 KG/M2

## 2024-06-03 DIAGNOSIS — R03.0 ELEVATED BLOOD-PRESSURE READING, W/OUT DIAGNOSIS OF HYPERTENSION: ICD-10-CM

## 2024-06-03 PROCEDURE — 99213 OFFICE O/P EST LOW 20 MIN: CPT | Mod: TH,25

## 2024-06-04 ENCOUNTER — NON-APPOINTMENT (OUTPATIENT)
Age: 29
End: 2024-06-04

## 2024-06-04 ENCOUNTER — APPOINTMENT (OUTPATIENT)
Dept: CARDIOLOGY | Facility: CLINIC | Age: 29
End: 2024-06-04

## 2024-06-04 VITALS
HEIGHT: 69 IN | WEIGHT: 165 LBS | DIASTOLIC BLOOD PRESSURE: 66 MMHG | OXYGEN SATURATION: 99 % | HEART RATE: 90 BPM | BODY MASS INDEX: 24.44 KG/M2 | SYSTOLIC BLOOD PRESSURE: 105 MMHG

## 2024-06-04 PROCEDURE — 93000 ELECTROCARDIOGRAM COMPLETE: CPT

## 2024-06-04 PROCEDURE — 99204 OFFICE O/P NEW MOD 45 MIN: CPT | Mod: 25

## 2024-06-05 PROBLEM — D24.2 FIBROADENOMA OF LEFT BREAST: Status: ACTIVE | Noted: 2023-03-26

## 2024-06-05 NOTE — PHYSICAL EXAM
[Normal Neck Lymph Nodes] : normal neck lymph nodes  [Normal Supraclavicular Lymph Nodes] : normal supraclavicular lymph nodes [Normal Axillary Lymph Nodes] : normal axillary lymph nodes [Normal] : normal appearance, no rash, nodules, vesicles, ulcers, erythema [Normal] : normal breast inspection and palpation of axillas [de-identified] : Normal breast exam with the exception of palpable left breast (retroareolar) mobile mass, 2.5cm.    No clinical lymphadenopathy.  [de-identified] : Normal respiratory effort

## 2024-06-05 NOTE — RESULTS/DATA
[FreeTextEntry1] : BREAST PATH/RAD REVIEW 2/17/23 L DX US: BIRADs 4 - L 3:00-RA 13 mm (palp) solid appearing nodule/mass with intraductal extension - (Rec USGBx) - L 12N1 7 mm anterior complicated septated cyst, L 2N2 8 mm (palp) oval shaped complicated solid cystic oval shaped nodule, L 3N2 5 mm hypoechoic complicated cyst vs homogeneous solid nodule (Rec 6-month f/u L Dx US) - L axilla w/ benign appearing LN  2/21/2023 BL Dx MG/ R Dx US: BIRADs 4, Extremely dense. - L middle -third lateral aspect palpable area w/ No mammographic findings - R 7N4 8 mm hypoechoic nodule, consider fibroadenoma-> Rec 6-month f/u R dx US -  R axilla w/ 11 mm benign appearing LN  ~~~ rec L USGBX of L 3:00 nodule not seen on MG 2/2 extremely dense breast, 6-month f/u L Dx US for prev ^described findings)    3/03/23 Left [3:00-RA] US core Bx: Fibroadenoma. Benign and concordant. Ribbon clip (Rec 6-month f/u BL Dx US)   3/25/24 BL Dx US: BIRADs 3.  - L 3:00-RA 2.3 cm (previously 1.1 cm) bx-proven FA w/ interval enlargement (Rec surg c/s) - R 7N4 0.7 cm (previously 0.8>0.6 cm) circumscribed hypoechoic mass overall minimally changed (Rec 6-month f/u R Dx US)

## 2024-06-05 NOTE — ASSESSMENT
[FreeTextEntry1] : THONG DANIEL is a 28-year F referred by Dr. Rivers at 13 weeks' gestation w/ left 3:00-RA bx-proven fibroadenoma showing interval increase in size. Here for follow-up.   We discussed that fibroadenomas are benign lesions and the possible follow up options include interval imaging versus surgical excision, in the setting of symptomatology or large size of lesion (>2cm).  They have the potential to grow and cause deformity, especially during future pregnancy.  Patient is currently symptomatic with pain at the fibroadenoma site and would like it removed. will do in-office surgical excision under local anesthesia. Informed consent obtained.   Procedure:  Left breast mass excision Using sterile technique, 10ml 1% lidocaine was injected to the Left 3:00 breast mass area.  A periareolar elliptical incision was made using an 11-blade scalpel, including the prior biopsy site. The mobile mass was pushed toward the incision and released from anchoring fibers using bovie electrocautery.   The mass was submitted to pathology for further evaluation.  The surgical site was irrigated and hemostasis was satisfactory.  The incision was reapproximated with 4-0 deep dermal interrupted sutures and running subcuticular 4-0 monocryl suture.  Dermabond and steristrips was applied.  Patient tolerated the procedure well with no complications.   Next imagin-month f/u Right Dx US due 2024 to further evaluate 7N4 mass.  RTO 1 week for post-procedure visit.

## 2024-06-05 NOTE — HISTORY OF PRESENT ILLNESS
[de-identified] : THONG DANIEL is a 28-year F referred by Dr. Rivers at 13 weeks' gestation w/ left 3:00-RA bx-proven fibroadenoma showing interval increase in size. Here for follow-up.   3/9/23 Patient presented for initial consult. Reported being able to palpable breast lumps while menstruating but recently noticed left breast lumps enlarging in size in November-2022. She went to her PCP for these findings.  Denies any nipple discharge or skin changes.  3/26/24  Since last visit, she underwent a bilateral ultrasound with interval enlargement of Left 3:00/retroareolar mass.  c/o tenderness and discomfort from left breast mass (biopsy-proven fibroadenoma).    PMHx: Denies  PSHx: L eft Breast lumpectomy 2014 - Fibroadenoma removal ([facility]) Meds: Vitamin D  NKDA Family Hx: No hx of breast cancer or ovarian cancer. Hodgkin's lymphoma (maternal 1st cousin).   GYN: , menarche age 13, LMP 2/15/23. Bra size: 32 B  Smoking or ETOH use:  Caffeine: former Coffee drinker--> Now drinks Mud water, No soda

## 2024-06-06 ENCOUNTER — APPOINTMENT (OUTPATIENT)
Dept: CARDIOLOGY | Facility: CLINIC | Age: 29
End: 2024-06-06
Payer: MEDICAID

## 2024-06-06 LAB
BILIRUB UR QL STRIP: NEGATIVE
CLARITY UR: CLEAR
COLLECTION METHOD: NORMAL
GLUCOSE UR-MCNC: NEGATIVE
HCG UR QL: 0.2 EU/DL
HGB UR QL STRIP.AUTO: NEGATIVE
KETONES UR-MCNC: NEGATIVE
LEUKOCYTE ESTERASE UR QL STRIP: NORMAL
NITRITE UR QL STRIP: NEGATIVE
PH UR STRIP: 7
PROT UR STRIP-MCNC: NORMAL
SP GR UR STRIP: 1.02

## 2024-06-06 PROCEDURE — 93306 TTE W/DOPPLER COMPLETE: CPT

## 2024-06-07 ENCOUNTER — NON-APPOINTMENT (OUTPATIENT)
Age: 29
End: 2024-06-07

## 2024-06-10 LAB
BSA DERIVED: 1.73 M2
CREAT 24H UR-MCNC: 1.6 G/24 H
CREAT ?TM UR-MCNC: 61 MG/DL
CREAT CL 24H UR+SERPL-VRATE: 220 ML/MIN
PROT 24H UR-MRATE: 13 MG/DL
PROT ?TM UR-MCNC: 24 HR
PROT UR-MCNC: 338 MG/24 H
SPECIMEN VOL 24H UR: 2600 ML

## 2024-06-17 ENCOUNTER — ASOB RESULT (OUTPATIENT)
Age: 29
End: 2024-06-17

## 2024-06-17 ENCOUNTER — INPATIENT (INPATIENT)
Facility: HOSPITAL | Age: 29
LOS: 0 days | Discharge: ANOTHER HOSPITAL | DRG: 305 | End: 2024-06-18
Attending: STUDENT IN AN ORGANIZED HEALTH CARE EDUCATION/TRAINING PROGRAM | Admitting: STUDENT IN AN ORGANIZED HEALTH CARE EDUCATION/TRAINING PROGRAM
Payer: MEDICAID

## 2024-06-17 ENCOUNTER — NON-APPOINTMENT (OUTPATIENT)
Age: 29
End: 2024-06-17

## 2024-06-17 ENCOUNTER — APPOINTMENT (OUTPATIENT)
Dept: ANTEPARTUM | Facility: CLINIC | Age: 29
End: 2024-06-17
Payer: MEDICAID

## 2024-06-17 VITALS
RESPIRATION RATE: 14 BRPM | OXYGEN SATURATION: 99 % | HEART RATE: 98 BPM | DIASTOLIC BLOOD PRESSURE: 78 MMHG | SYSTOLIC BLOOD PRESSURE: 121 MMHG | TEMPERATURE: 100 F

## 2024-06-17 VITALS
WEIGHT: 166.89 LBS | HEIGHT: 69 IN | DIASTOLIC BLOOD PRESSURE: 73 MMHG | TEMPERATURE: 99 F | SYSTOLIC BLOOD PRESSURE: 113 MMHG | OXYGEN SATURATION: 98 % | RESPIRATION RATE: 17 BRPM | HEART RATE: 89 BPM

## 2024-06-17 DIAGNOSIS — N88.3 INCOMPETENCE OF CERVIX UTERI: ICD-10-CM

## 2024-06-17 DIAGNOSIS — O26.899 OTHER SPECIFIED PREGNANCY RELATED CONDITIONS, UNSPECIFIED TRIMESTER: ICD-10-CM

## 2024-06-17 DIAGNOSIS — Z98.890 OTHER SPECIFIED POSTPROCEDURAL STATES: ICD-10-CM

## 2024-06-17 DIAGNOSIS — O42.919 PRETERM PREMATURE RUPTURE OF MEMBRANES, UNSPECIFIED AS TO LENGTH OF TIME BETWEEN RUPTURE AND ONSET OF LABOR, UNSPECIFIED TRIMESTER: ICD-10-CM

## 2024-06-17 DIAGNOSIS — Z98.890 OTHER SPECIFIED POSTPROCEDURAL STATES: Chronic | ICD-10-CM

## 2024-06-17 DIAGNOSIS — B00.9 HERPESVIRAL INFECTION, UNSPECIFIED: ICD-10-CM

## 2024-06-17 DIAGNOSIS — Z34.80 ENCOUNTER FOR SUPERVISION OF OTHER NORMAL PREGNANCY, UNSPECIFIED TRIMESTER: ICD-10-CM

## 2024-06-17 LAB
ALLERGY+IMMUNOLOGY DIAG STUDY NOTE: SIGNIFICANT CHANGE UP
AMNISURE ROM (RUPTURE OF MEMBRANES): POSITIVE
APPEARANCE UR: ABNORMAL
BACTERIA # UR AUTO: ABNORMAL /HPF
BASOPHILS # BLD AUTO: 0 K/UL — SIGNIFICANT CHANGE UP (ref 0–0.2)
BASOPHILS NFR BLD AUTO: 0 % — SIGNIFICANT CHANGE UP (ref 0–2)
BILIRUB UR-MCNC: NEGATIVE — SIGNIFICANT CHANGE UP
BLD GP AB SCN SERPL QL: SIGNIFICANT CHANGE UP
COLOR SPEC: YELLOW — SIGNIFICANT CHANGE UP
DIFF PNL FLD: NEGATIVE — SIGNIFICANT CHANGE UP
EOSINOPHIL # BLD AUTO: 0 K/UL — SIGNIFICANT CHANGE UP (ref 0–0.5)
EOSINOPHIL NFR BLD AUTO: 0 % — SIGNIFICANT CHANGE UP (ref 0–6)
EPI CELLS # UR: PRESENT
GLUCOSE UR QL: NEGATIVE MG/DL — SIGNIFICANT CHANGE UP
HBV SURFACE AG SERPL QL IA: SIGNIFICANT CHANGE UP
HCT VFR BLD CALC: 33.9 % — LOW (ref 34.5–45)
HGB BLD-MCNC: 11.6 G/DL — SIGNIFICANT CHANGE UP (ref 11.5–15.5)
HIV 1 & 2 AB SERPL IA.RAPID: SIGNIFICANT CHANGE UP
INR BLD: 0.9 RATIO — SIGNIFICANT CHANGE UP (ref 0.85–1.18)
KETONES UR-MCNC: NEGATIVE MG/DL — SIGNIFICANT CHANGE UP
LEUKOCYTE ESTERASE UR-ACNC: ABNORMAL
LYMPHOCYTES # BLD AUTO: 2.96 K/UL — SIGNIFICANT CHANGE UP (ref 1–3.3)
LYMPHOCYTES # BLD AUTO: 25 % — SIGNIFICANT CHANGE UP (ref 13–44)
MANUAL SMEAR VERIFICATION: SIGNIFICANT CHANGE UP
MCHC RBC-ENTMCNC: 29.1 PG — SIGNIFICANT CHANGE UP (ref 27–34)
MCHC RBC-ENTMCNC: 34.2 GM/DL — SIGNIFICANT CHANGE UP (ref 32–36)
MCV RBC AUTO: 85 FL — SIGNIFICANT CHANGE UP (ref 80–100)
METAMYELOCYTES # FLD: 1 % — HIGH (ref 0–0)
MONOCYTES # BLD AUTO: 0.59 K/UL — SIGNIFICANT CHANGE UP (ref 0–0.9)
MONOCYTES NFR BLD AUTO: 5 % — SIGNIFICANT CHANGE UP (ref 2–14)
MYELOCYTES NFR BLD: 3 % — HIGH (ref 0–0)
NEUTROPHILS # BLD AUTO: 7.68 K/UL — HIGH (ref 1.8–7.4)
NEUTROPHILS NFR BLD AUTO: 65 % — SIGNIFICANT CHANGE UP (ref 43–77)
NITRITE UR-MCNC: NEGATIVE — SIGNIFICANT CHANGE UP
NRBC # BLD: 0 /100 WBCS — SIGNIFICANT CHANGE UP (ref 0–0)
PH UR: 7.5 — SIGNIFICANT CHANGE UP (ref 5–8)
PLAT MORPH BLD: NORMAL — SIGNIFICANT CHANGE UP
PLATELET # BLD AUTO: 200 K/UL — SIGNIFICANT CHANGE UP (ref 150–400)
PROT UR-MCNC: ABNORMAL MG/DL
PROTHROM AB SERPL-ACNC: 10.3 SEC — SIGNIFICANT CHANGE UP (ref 9.5–13)
RBC # BLD: 3.99 M/UL — SIGNIFICANT CHANGE UP (ref 3.8–5.2)
RBC # FLD: 13.2 % — SIGNIFICANT CHANGE UP (ref 10.3–14.5)
RBC BLD AUTO: NORMAL — SIGNIFICANT CHANGE UP
RBC CASTS # UR COMP ASSIST: 2 /HPF — SIGNIFICANT CHANGE UP (ref 0–4)
SP GR SPEC: 1.02 — SIGNIFICANT CHANGE UP (ref 1–1.03)
UROBILINOGEN FLD QL: 0.2 MG/DL — SIGNIFICANT CHANGE UP (ref 0.2–1)
VARIANT LYMPHS # BLD: 1 % — SIGNIFICANT CHANGE UP (ref 0–6)
WBC # BLD: 11.82 K/UL — HIGH (ref 3.8–10.5)
WBC # FLD AUTO: 11.82 K/UL — HIGH (ref 3.8–10.5)
WBC UR QL: >45 /HPF — HIGH (ref 0–5)

## 2024-06-17 RX ORDER — AMOXICILLIN 500 MG
500 CAPSULE ORAL EVERY 8 HOURS
Refills: 0 | Status: DISCONTINUED | OUTPATIENT
Start: 2024-06-17 | End: 2024-06-18

## 2024-06-17 RX ORDER — BETAMETHASONE SODIUM PHOSPHATE
12 POWDER (GRAM) MISCELLANEOUS EVERY 24 HOURS
Refills: 0 | Status: DISCONTINUED | OUTPATIENT
Start: 2024-06-17 | End: 2024-06-18

## 2024-06-17 RX ORDER — TRISODIUM CITRATE DIHYDRATE AND CITRIC ACID MONOHYDRATE 500; 334 MG/5ML; MG/5ML
15 SOLUTION ORAL EVERY 6 HOURS
Refills: 0 | Status: DISCONTINUED | OUTPATIENT
Start: 2024-06-17 | End: 2024-06-18

## 2024-06-17 RX ORDER — AMPICILLIN TRIHYDRATE 250 MG
CAPSULE ORAL
Refills: 0 | Status: DISCONTINUED | OUTPATIENT
Start: 2024-06-17 | End: 2024-06-18

## 2024-06-17 RX ORDER — INDOMETHACIN 75 MG/1
50 CAPSULE, EXTENDED RELEASE ORAL ONCE
Refills: 0 | Status: COMPLETED | OUTPATIENT
Start: 2024-06-17 | End: 2024-06-17

## 2024-06-17 RX ORDER — AMPICILLIN TRIHYDRATE 250 MG
2 CAPSULE ORAL ONCE
Refills: 0 | Status: COMPLETED | OUTPATIENT
Start: 2024-06-17 | End: 2024-06-17

## 2024-06-17 RX ORDER — DEXTROSE MONOHYDRATE AND SODIUM CHLORIDE 5; .3 G/100ML; G/100ML
1000 INJECTION, SOLUTION INTRAVENOUS
Refills: 0 | Status: DISCONTINUED | OUTPATIENT
Start: 2024-06-17 | End: 2024-06-18

## 2024-06-17 RX ORDER — MAGNESIUM SULFATE 100 %
4 POWDER (GRAM) MISCELLANEOUS ONCE
Refills: 0 | Status: COMPLETED | OUTPATIENT
Start: 2024-06-17 | End: 2024-06-17

## 2024-06-17 RX ORDER — AZITHROMYCIN 250 MG/1
1000 TABLET, FILM COATED ORAL ONCE
Refills: 0 | Status: COMPLETED | OUTPATIENT
Start: 2024-06-17 | End: 2024-06-17

## 2024-06-17 RX ORDER — VALACYCLOVIR HYDROCHLORIDE 500 MG/1
500 TABLET, FILM COATED ORAL
Refills: 0 | Status: DISCONTINUED | OUTPATIENT
Start: 2024-06-17 | End: 2024-06-18

## 2024-06-17 RX ORDER — MAGNESIUM SULFATE 100 %
2 POWDER (GRAM) MISCELLANEOUS
Qty: 40 | Refills: 0 | Status: DISCONTINUED | OUTPATIENT
Start: 2024-06-17 | End: 2024-06-18

## 2024-06-17 RX ORDER — DEXTROSE MONOHYDRATE AND SODIUM CHLORIDE 5; .3 G/100ML; G/100ML
1000 INJECTION, SOLUTION INTRAVENOUS
Refills: 0 | Status: DISCONTINUED | OUTPATIENT
Start: 2024-06-17 | End: 2024-06-17

## 2024-06-17 RX ORDER — AMPICILLIN TRIHYDRATE 250 MG
2 CAPSULE ORAL EVERY 6 HOURS
Refills: 0 | Status: DISCONTINUED | OUTPATIENT
Start: 2024-06-18 | End: 2024-06-18

## 2024-06-17 RX ADMIN — Medication 50 GM/HR: at 19:34

## 2024-06-17 RX ADMIN — Medication 12 MILLIGRAM(S): at 18:42

## 2024-06-17 RX ADMIN — VALACYCLOVIR HYDROCHLORIDE 500 MILLIGRAM(S): 500 TABLET, FILM COATED ORAL at 19:21

## 2024-06-17 RX ADMIN — Medication 100 GRAM(S): at 18:46

## 2024-06-17 RX ADMIN — Medication 300 GRAM(S): at 19:03

## 2024-06-17 RX ADMIN — INDOMETHACIN 50 MILLIGRAM(S): 75 CAPSULE, EXTENDED RELEASE ORAL at 21:47

## 2024-06-17 RX ADMIN — AZITHROMYCIN 1000 MILLIGRAM(S): 250 TABLET, FILM COATED ORAL at 19:21

## 2024-06-17 RX ADMIN — DEXTROSE MONOHYDRATE AND SODIUM CHLORIDE 125 MILLILITER(S): 5; .3 INJECTION, SOLUTION INTRAVENOUS at 18:45

## 2024-06-18 ENCOUNTER — APPOINTMENT (OUTPATIENT)
Dept: ANTEPARTUM | Facility: CLINIC | Age: 29
End: 2024-06-18
Payer: MEDICAID

## 2024-06-18 ENCOUNTER — INPATIENT (INPATIENT)
Facility: HOSPITAL | Age: 29
LOS: 29 days | Discharge: HOME | End: 2024-07-18
Attending: SPECIALIST | Admitting: SPECIALIST
Payer: MEDICAID

## 2024-06-18 ENCOUNTER — ASOB RESULT (OUTPATIENT)
Age: 29
End: 2024-06-18

## 2024-06-18 VITALS — HEART RATE: 98 BPM | OXYGEN SATURATION: 99 %

## 2024-06-18 DIAGNOSIS — Z98.890 OTHER SPECIFIED POSTPROCEDURAL STATES: Chronic | ICD-10-CM

## 2024-06-18 DIAGNOSIS — O26.899 OTHER SPECIFIED PREGNANCY RELATED CONDITIONS, UNSPECIFIED TRIMESTER: ICD-10-CM

## 2024-06-18 LAB
BLD GP AB SCN SERPL QL: POSITIVE — SIGNIFICANT CHANGE UP
C TRACH RRNA SPEC QL NAA+PROBE: SIGNIFICANT CHANGE UP
HIV 1+2 AB+HIV1 P24 AG SERPL QL IA: SIGNIFICANT CHANGE UP
MAGNESIUM SERPL-MCNC: 4.2 MG/DL — HIGH (ref 1.6–2.6)
MAGNESIUM SERPL-MCNC: 4.2 MG/DL — HIGH (ref 1.6–2.6)
MAGNESIUM SERPL-MCNC: 4.3 MG/DL — HIGH (ref 1.6–2.6)
N GONORRHOEA RRNA SPEC QL NAA+PROBE: SIGNIFICANT CHANGE UP
RH IG SCN BLD-IMP: NEGATIVE — SIGNIFICANT CHANGE UP
RH IG SCN BLD-IMP: NEGATIVE — SIGNIFICANT CHANGE UP
SPECIMEN SOURCE: SIGNIFICANT CHANGE UP
SPECIMEN SOURCE: SIGNIFICANT CHANGE UP
T PALLIDUM AB TITR SER: NEGATIVE — SIGNIFICANT CHANGE UP
T VAGINALIS RRNA SPEC QL NAA+PROBE: SIGNIFICANT CHANGE UP

## 2024-06-18 PROCEDURE — 87086 URINE CULTURE/COLONY COUNT: CPT

## 2024-06-18 PROCEDURE — 86780 TREPONEMA PALLIDUM: CPT

## 2024-06-18 PROCEDURE — 86703 HIV-1/HIV-2 1 RESULT ANTBDY: CPT

## 2024-06-18 PROCEDURE — 85610 PROTHROMBIN TIME: CPT

## 2024-06-18 PROCEDURE — 87340 HEPATITIS B SURFACE AG IA: CPT

## 2024-06-18 PROCEDURE — 86870 RBC ANTIBODY IDENTIFICATION: CPT

## 2024-06-18 PROCEDURE — 86077 PHYS BLOOD BANK SERV XMATCH: CPT

## 2024-06-18 PROCEDURE — 76816 OB US FOLLOW-UP PER FETUS: CPT | Mod: 26

## 2024-06-18 PROCEDURE — 86900 BLOOD TYPING SEROLOGIC ABO: CPT

## 2024-06-18 PROCEDURE — 87491 CHLMYD TRACH DNA AMP PROBE: CPT

## 2024-06-18 PROCEDURE — 76815 OB US LIMITED FETUS(S): CPT | Mod: 26

## 2024-06-18 PROCEDURE — 86850 RBC ANTIBODY SCREEN: CPT

## 2024-06-18 PROCEDURE — 81001 URINALYSIS AUTO W/SCOPE: CPT

## 2024-06-18 PROCEDURE — 87661 TRICHOMONAS VAGINALIS AMPLIF: CPT

## 2024-06-18 PROCEDURE — 36415 COLL VENOUS BLD VENIPUNCTURE: CPT

## 2024-06-18 PROCEDURE — 87591 N.GONORRHOEAE DNA AMP PROB: CPT

## 2024-06-18 PROCEDURE — 85025 COMPLETE CBC W/AUTO DIFF WBC: CPT

## 2024-06-18 PROCEDURE — 87077 CULTURE AEROBIC IDENTIFY: CPT

## 2024-06-18 PROCEDURE — 87653 STREP B DNA AMP PROBE: CPT

## 2024-06-18 PROCEDURE — 84112 EVAL AMNIOTIC FLUID PROTEIN: CPT

## 2024-06-18 PROCEDURE — 86901 BLOOD TYPING SEROLOGIC RH(D): CPT

## 2024-06-18 PROCEDURE — 76819 FETAL BIOPHYS PROFIL W/O NST: CPT | Mod: 26

## 2024-06-18 PROCEDURE — 87389 HIV-1 AG W/HIV-1&-2 AB AG IA: CPT

## 2024-06-18 PROCEDURE — 83735 ASSAY OF MAGNESIUM: CPT

## 2024-06-18 PROCEDURE — 76820 UMBILICAL ARTERY ECHO: CPT | Mod: 26,59

## 2024-06-18 PROCEDURE — 76819 FETAL BIOPHYS PROFIL W/O NST: CPT | Mod: 26,79,59

## 2024-06-18 RX ORDER — MAGNESIUM SULFATE 100 %
2 POWDER (GRAM) MISCELLANEOUS
Qty: 40 | Refills: 0 | Status: DISCONTINUED | OUTPATIENT
Start: 2024-06-18 | End: 2024-06-18

## 2024-06-18 RX ORDER — BETAMETHASONE SODIUM PHOSPHATE
12 POWDER (GRAM) MISCELLANEOUS ONCE
Refills: 0 | Status: COMPLETED | OUTPATIENT
Start: 2024-06-18 | End: 2024-06-18

## 2024-06-18 RX ORDER — AMPICILLIN TRIHYDRATE 250 MG
2 CAPSULE ORAL EVERY 6 HOURS
Refills: 0 | Status: DISCONTINUED | OUTPATIENT
Start: 2024-06-18 | End: 2024-06-20

## 2024-06-18 RX ORDER — VALACYCLOVIR HYDROCHLORIDE 500 MG/1
500 TABLET, FILM COATED ORAL
Refills: 0 | Status: DISCONTINUED | OUTPATIENT
Start: 2024-06-18 | End: 2024-07-16

## 2024-06-18 RX ORDER — BETAMETHASONE SODIUM PHOSPHATE
12 POWDER (GRAM) MISCELLANEOUS ONCE
Refills: 0 | Status: DISCONTINUED | OUTPATIENT
Start: 2024-06-18 | End: 2024-06-18

## 2024-06-18 RX ORDER — TETANUS TOXOID, REDUCED DIPHTHERIA TOXOID AND ACELLULAR PERTUSSIS VACCINE, ADSORBED 5; 2.5; 8; 8; 2.5 [IU]/.5ML; [IU]/.5ML; UG/.5ML; UG/.5ML; UG/.5ML
0.5 SUSPENSION INTRAMUSCULAR ONCE
Refills: 0 | Status: COMPLETED | OUTPATIENT
Start: 2024-06-18 | End: 2024-06-18

## 2024-06-18 RX ORDER — DEXTROSE MONOHYDRATE AND SODIUM CHLORIDE 5; .3 G/100ML; G/100ML
1000 INJECTION, SOLUTION INTRAVENOUS
Refills: 0 | Status: DISCONTINUED | OUTPATIENT
Start: 2024-06-18 | End: 2024-06-18

## 2024-06-18 RX ORDER — AMPICILLIN TRIHYDRATE 250 MG
CAPSULE ORAL
Refills: 0 | Status: DISCONTINUED | OUTPATIENT
Start: 2024-06-18 | End: 2024-06-20

## 2024-06-18 RX ORDER — INDOMETHACIN 75 MG/1
25 CAPSULE, EXTENDED RELEASE ORAL EVERY 6 HOURS
Refills: 0 | Status: DISCONTINUED | OUTPATIENT
Start: 2024-06-18 | End: 2024-06-18

## 2024-06-18 RX ORDER — MAGNESIUM SULFATE 100 %
4 POWDER (GRAM) MISCELLANEOUS ONCE
Refills: 0 | Status: DISCONTINUED | OUTPATIENT
Start: 2024-06-18 | End: 2024-06-18

## 2024-06-18 RX ORDER — HEPARIN SODIUM 50 [USP'U]/ML
5000 INJECTION, SOLUTION INTRAVENOUS EVERY 12 HOURS
Refills: 0 | Status: DISCONTINUED | OUTPATIENT
Start: 2024-06-18 | End: 2024-07-16

## 2024-06-18 RX ORDER — AMPICILLIN TRIHYDRATE 250 MG
2 CAPSULE ORAL ONCE
Refills: 0 | Status: COMPLETED | OUTPATIENT
Start: 2024-06-18 | End: 2024-06-18

## 2024-06-18 RX ADMIN — Medication 200 GRAM(S): at 04:05

## 2024-06-18 RX ADMIN — INDOMETHACIN 25 MILLIGRAM(S): 75 CAPSULE, EXTENDED RELEASE ORAL at 09:58

## 2024-06-18 RX ADMIN — Medication 12 MILLIGRAM(S): at 17:30

## 2024-06-18 RX ADMIN — Medication 200 GRAM(S): at 22:13

## 2024-06-18 RX ADMIN — INDOMETHACIN 25 MILLIGRAM(S): 75 CAPSULE, EXTENDED RELEASE ORAL at 04:43

## 2024-06-18 RX ADMIN — HEPARIN SODIUM 5000 UNIT(S): 50 INJECTION, SOLUTION INTRAVENOUS at 22:15

## 2024-06-18 RX ADMIN — TETANUS TOXOID, REDUCED DIPHTHERIA TOXOID AND ACELLULAR PERTUSSIS VACCINE, ADSORBED 0.5 MILLILITER(S): 5; 2.5; 8; 8; 2.5 SUSPENSION INTRAMUSCULAR at 17:27

## 2024-06-18 RX ADMIN — Medication 200 GRAM(S): at 09:55

## 2024-06-18 RX ADMIN — VALACYCLOVIR HYDROCHLORIDE 500 MILLIGRAM(S): 500 TABLET, FILM COATED ORAL at 08:06

## 2024-06-18 RX ADMIN — INDOMETHACIN 25 MILLIGRAM(S): 75 CAPSULE, EXTENDED RELEASE ORAL at 10:00

## 2024-06-18 RX ADMIN — VALACYCLOVIR HYDROCHLORIDE 500 MILLIGRAM(S): 500 TABLET, FILM COATED ORAL at 21:25

## 2024-06-18 RX ADMIN — Medication 200 GRAM(S): at 17:33

## 2024-06-18 RX ADMIN — HEPARIN SODIUM 5000 UNIT(S): 50 INJECTION, SOLUTION INTRAVENOUS at 11:47

## 2024-06-18 RX ADMIN — Medication 50 GM/HR: at 09:04

## 2024-06-18 RX ADMIN — DEXTROSE MONOHYDRATE AND SODIUM CHLORIDE 50 MILLILITER(S): 5; .3 INJECTION, SOLUTION INTRAVENOUS at 09:55

## 2024-06-18 RX ADMIN — Medication 1 APPLICATION(S): at 11:49

## 2024-06-18 RX ADMIN — INDOMETHACIN 25 MILLIGRAM(S): 75 CAPSULE, EXTENDED RELEASE ORAL at 04:08

## 2024-06-18 NOTE — OB RN PATIENT PROFILE - PATERNAL MARITAL STATUS, OB PROFILE
likely factitious at this time given the cold extremities ( vascular). Unlikely to be reactive hypoglycemia given no risk factors.   - f/u Endocrine Current HgB of 8.5, which appears stable from prior  -No overt bleed, likely ACD partnered

## 2024-06-18 NOTE — CONSULT NOTE PEDS - ASSESSMENT
Ms. Corona is a 29 y/o  transfer from [facility] at 30w6d gestational age for PPROM. Maternal history noncontributory, and prenatal history notable for short cervix treated with vaginal progesterone, palpitations in pregnancy on Holter monitoring, HSV-2 prodromal symptoms 1 week prior but unclear if had an outbreak. Most recent EFW 1103g on 24. NICU consulted to discuss what to expect if she were to deliver at 30 weeks gestation.    I met with Ms. Corona and her partner and we reviewed the followin. The NICU team will be present at her delivery and will immediately assess and care for her infant.    2. The infant will likely require respiratory support, most commonly in the form of nasal CPAP. The outcomes improve after  steroids. Some infants at this gestational age may require intubation and mechanical ventilation.    3. Depending on the clinical status of the infant, enteral feedings will likely not be started immediately. IV nutrition in the form of TPN would be provided via umbilical line or PICC until the infant is able to tolerate full enteral feedings. Due to immature suck/swallow, the infant may require an orogastric tube once feeds are initiated. The infant is also at risk for hypoglycemia.    4. Discussed the benefits of breastfeeding in  infants and encouraged mother to pump following delivery.    5. Due to prematurity, the infant will be at increased risk for infection and would likely be started on antibiotics after birth. The infant will be screened for infections following delivery and may require other courses of antibiotics during their hospital course if an infection were suspected. If the infant shows signs and symptoms of feeding intolerance, feedings may be held, and the infant may require evaluation for intestinal infection (necrotizing enterocolitis).    6. Risk of symptomatic PDA discussed with possible need for medical vs procedural closure.    7. Risk of intraventricular hemorrhage (IVH) and possible consequences discussed.    8. Retinopathy of prematurity (ROP) risk discussed along with close follow up with ophthalmologist. If ROP is significant, medical or surgical treatment may be required.    9. The infant is at risk for developmental delays as a consequence of prematurity. The infant will be evaluated by a developmental pediatrician to monitor for neurodevelopmental delays.    10. Elevated risk of jaundice and possible requirement for phototherapy discussed.    11. Thermoregulation issues and need to be in an isolette with slow weaning to a crib discussed.    12. Length of stay is highly variable, but given the infant’s size gestational age, average stay is approximately 6-8 weeks. Discussed discharged criteria.    Ms. Corona had the chance to ask any questions and was encouraged to contact the NICU at that time if additional questions arise.    Thank you for the opportunity to participate in the care of this patient and please inform us of any changes in her status.     Bin Batista MD  - Medicine Fellow

## 2024-06-18 NOTE — OB PROVIDER TRIAGE NOTE - CURRENT PREGNANCY COMPLICATIONS, OB PROFILE
Gestational Age less than 36 Weeks Incompetent Cervix/Cervical Insufficiency/Gestational Age less than 36 Weeks/Maternal Unknown GBS

## 2024-06-18 NOTE — OB RN TRIAGE NOTE - NSICDXPASTSURGICALHX_GEN_ALL_CORE_FT
PAST SURGICAL HISTORY:  History of loop electrical excision procedure (LEEP)     S/P lumpectomy, left breast

## 2024-06-18 NOTE — OB RN TRIAGE NOTE - NSICDXFAMILYHX_GEN_ALL_CORE_FT
FAMILY HISTORY:  Mother  Still living? Yes, Estimated age: 53  FH: asthma, Age at diagnosis: Age Unknown

## 2024-06-18 NOTE — OB PROVIDER H&P - NS_AMNISURE_OBGYN_ALL_OB
Not Done
Follow up your test results with your primary care physician within 2-3 days.    Take over the counter Tylenol as needed for headache     Stay hydrated    Return to the ER if your symptoms worsen, high fevers, severe pain or for any other medical emergencies

## 2024-06-18 NOTE — OB PROVIDER TRIAGE NOTE - NS_OBGYNHISTORY_OBGYN_ALL_OB_FT
PCOS  HSv 2 AP course complicated by:  HSV 2- reports prodromal symptoms 1 week ago  Short cervix- (2.7) treated with vaginal progesterone  GBS unknown    GBS  Urine culture  Trich/Gonorrhea/Chlamydia      all expedited by Terry Romero

## 2024-06-18 NOTE — OB PROVIDER H&P - ASSESSMENT
Admit for PPROM at 30.6- [facility]  Continue Magnesium Sulfate Therapy @ 2g/hr  Betamethasone Dose #2 for 6/18 1900  Continue Ampicillin 2g Q 6 hours for 48 hours  Valtrex 500mg BID  Indocin 25mg Q 6 hours  All prenatal labs and GBS expedited by Terry Romero  Continuous EFM/TOCO  MFM US  D/W Dr. De Jesus

## 2024-06-18 NOTE — OB PROVIDER H&P - HISTORY OF PRESENT ILLNESS
29yo  @ 30.6 transfer from [facility] for PPROM. Patient reports gush of fluid at 1355 on . Denies VB, ctx and reports GFM.   Gets care with Dr. Leisa Martinez affiliated with [facility].  Rh neg, rec'd beta   AP course complicated by short cervix (2.7) treated with vaginal progesterone.    Left breast lumpectomy- 2024 benign  2019- LEEP  Anemia  Palpitations this pregnancy, currently wearing 2 week Holter, last day is tomorrow. Denies feeling palpitations at this time.   PCOS  HSV 2- had prodromal symptoms symptoms 1 week ago, unsure if she had an outbreak

## 2024-06-18 NOTE — OB PROVIDER TRIAGE NOTE - HISTORY OF PRESENT ILLNESS
29yo  @ 30.6 transfer from [facility] for PPROM. Patient reports gush of fluid at 1355 on . Denies VB, ctx and reports GFM.   Gets care with Dr. Martinez affiliated with [facility].  AP course complicated by short cervix (2.7) treated with vaginal progesterone. 29yo  @ 30.6 transfer from [facility] for PPROM. Patient reports gush of fluid at 1355 on . Denies VB, ctx and reports GFM.   Gets care with Dr. Martinez affiliated with [facility].  Rh neg, rec'd beta   AP course complicated by short cervix (2.7) treated with vaginal progesterone.    Left breast lumpectomy- 2024 benign  Anemia  Palpitations this pregnancy, currently wearing 2 week Holter, last day is tomorrow. Denies feeling palpitations at this time.   PCOS  HSV 2- had prodromal symptoms symptoms 1 week ago, unsure if she had an outbreak

## 2024-06-18 NOTE — OB PROVIDER H&P - NS_OBGYNHISTORY_OBGYN_ALL_OB_FT
AP course complicated by:  HSV 2- reports prodromal symptoms 1 week ago  Short cervix- (2.7) treated with vaginal progesterone  GBS unknown    GBS  Urine culture  Trich/Gonorrhea/Chlamydia      all expedited by Terry Romero

## 2024-06-18 NOTE — OB RN PATIENT PROFILE - FALL HARM RISK - UNIVERSAL INTERVENTIONS
Bed in lowest position, wheels locked, appropriate side rails in place/Call bell, personal items and telephone in reach/Instruct patient to call for assistance before getting out of bed or chair/Non-slip footwear when patient is out of bed/[place] to call system/Physically safe environment - no spills, clutter or unnecessary equipment/Purposeful Proactive Rounding/Room/bathroom lighting operational, light cord in reach

## 2024-06-18 NOTE — OB RN TRIAGE NOTE - NSICDXPASTMEDICALHX_GEN_ALL_CORE_FT
PAST MEDICAL HISTORY:  Herpes     HPV in female     Other hypertrophic disorders of skin     PCO (polycystic ovaries)

## 2024-06-18 NOTE — OB RN TRIAGE NOTE - FALL HARM RISK - UNIVERSAL INTERVENTIONS
Bed in lowest position, wheels locked, appropriate side rails in place/Call bell, personal items and telephone in reach/Instruct patient to call for assistance before getting out of bed or chair/Non-slip footwear when patient is out of bed/[place] to call system/Physically safe environment - no spills, clutter or unnecessary equipment/Purposeful Proactive Rounding/Room/bathroom lighting operational, light cord in reach

## 2024-06-18 NOTE — OB PROVIDER TRIAGE NOTE - NSHPPHYSICALEXAM_GEN_ALL_CORE
Assessment reveals VSS, abdomen soft, NT, gravid.  BPP 8/8, JUAN 7.8, vtx, posterior placenta-saved in asob  Cat 1 FHT, ctx occasional on toco  Reactive NST    Patient currently on magnesium sulfate therapy 2g/hr  S/P Beta #1- 6/17 1900  S/P Ampicillin 2g 6/17 @ 1900    Dr. De Jesus in to evaluate patient.   SSE- cervix appears 1cm dilated. Noted to be grossly ruptured clear fluid  No herpatic lesions noted. occupational therapy/social work

## 2024-06-18 NOTE — OB PROVIDER H&P - NSLOWPPHRISK_OBGYN_A_OB
No previous uterine incision/[place] Pregnancy/Less than or equal to 4 previous vaginal births/No known bleeding disorder/No history of postpartum hemorrhage

## 2024-06-18 NOTE — OB RN PATIENT PROFILE - CURRENT PREGNANCY COMPLICATIONS, OB PROFILE
Incompetent Cervix/Cervical Insufficiency/ Premature Rupture of Membranes (PPROM)/Gestational Age less than 36 Weeks/Maternal Unknown GBS

## 2024-06-18 NOTE — OB PROVIDER H&P - NSHPPHYSICALEXAM_GEN_ALL_CORE
Assessment reveals VSS, abdomen soft, NT, gravid.  BPP 8/8, JUAN 7.8, vtx, posterior placenta-saved in asob  Cat 1 FHT, ctx occasional on toco  Reactive NST    Patient currently on magnesium sulfate therapy 2g/hr  S/P Beta #1- 6/17 1900  S/P Ampicillin 2g 6/17 @ 1900    Dr. De Jesus in to evaluate patient.   SSE- cervix appears 1cm dilated. Noted to be grossly ruptured clear fluid  No herpatic lesions noted.

## 2024-06-19 ENCOUNTER — APPOINTMENT (OUTPATIENT)
Dept: ANTEPARTUM | Facility: CLINIC | Age: 29
End: 2024-06-19

## 2024-06-19 ENCOUNTER — APPOINTMENT (OUTPATIENT)
Dept: OBGYN | Facility: CLINIC | Age: 29
End: 2024-06-19

## 2024-06-19 DIAGNOSIS — O42.10 PREMATURE RUPTURE OF MEMBRANES, ONSET OF LABOR MORE THAN 24 HOURS FOLLOWING RUPTURE, UNSPECIFIED WEEKS OF GESTATION: ICD-10-CM

## 2024-06-19 LAB
ALBUMIN SERPL ELPH-MCNC: 3.8 G/DL — SIGNIFICANT CHANGE UP (ref 3.3–5)
ALP SERPL-CCNC: 74 U/L — SIGNIFICANT CHANGE UP (ref 40–120)
ALT FLD-CCNC: 15 U/L — SIGNIFICANT CHANGE UP (ref 4–33)
ANION GAP SERPL CALC-SCNC: 13 MMOL/L — SIGNIFICANT CHANGE UP (ref 7–14)
AST SERPL-CCNC: 17 U/L — SIGNIFICANT CHANGE UP (ref 4–32)
BASOPHILS # BLD AUTO: 0.03 K/UL — SIGNIFICANT CHANGE UP (ref 0–0.2)
BASOPHILS NFR BLD AUTO: 0.2 % — SIGNIFICANT CHANGE UP (ref 0–2)
BILIRUB SERPL-MCNC: 0.3 MG/DL — SIGNIFICANT CHANGE UP (ref 0.2–1.2)
BUN SERPL-MCNC: 8 MG/DL — SIGNIFICANT CHANGE UP (ref 7–23)
CALCIUM SERPL-MCNC: 9.4 MG/DL — SIGNIFICANT CHANGE UP (ref 8.4–10.5)
CHLORIDE SERPL-SCNC: 106 MMOL/L — SIGNIFICANT CHANGE UP (ref 98–107)
CO2 SERPL-SCNC: 21 MMOL/L — LOW (ref 22–31)
CREAT SERPL-MCNC: 0.46 MG/DL — LOW (ref 0.5–1.3)
CULTURE RESULTS: ABNORMAL
EGFR: 132 ML/MIN/1.73M2 — SIGNIFICANT CHANGE UP
EOSINOPHIL # BLD AUTO: 0.01 K/UL — SIGNIFICANT CHANGE UP (ref 0–0.5)
EOSINOPHIL NFR BLD AUTO: 0.1 % — SIGNIFICANT CHANGE UP (ref 0–6)
GLUCOSE SERPL-MCNC: 103 MG/DL — HIGH (ref 70–99)
GROUP B BETA STREP DNA (PCR): DETECTED
HCT VFR BLD CALC: 31.1 % — LOW (ref 34.5–45)
HGB BLD-MCNC: 10.7 G/DL — LOW (ref 11.5–15.5)
IANC: 10.46 K/UL — HIGH (ref 1.8–7.4)
IMM GRANULOCYTES NFR BLD AUTO: 2.5 % — HIGH (ref 0–0.9)
LDH SERPL L TO P-CCNC: 186 U/L — SIGNIFICANT CHANGE UP (ref 135–225)
LYMPHOCYTES # BLD AUTO: 1.72 K/UL — SIGNIFICANT CHANGE UP (ref 1–3.3)
LYMPHOCYTES # BLD AUTO: 13 % — SIGNIFICANT CHANGE UP (ref 13–44)
MCHC RBC-ENTMCNC: 29.1 PG — SIGNIFICANT CHANGE UP (ref 27–34)
MCHC RBC-ENTMCNC: 34.4 GM/DL — SIGNIFICANT CHANGE UP (ref 32–36)
MCV RBC AUTO: 84.5 FL — SIGNIFICANT CHANGE UP (ref 80–100)
MONOCYTES # BLD AUTO: 0.66 K/UL — SIGNIFICANT CHANGE UP (ref 0–0.9)
MONOCYTES NFR BLD AUTO: 5 % — SIGNIFICANT CHANGE UP (ref 2–14)
NEUTROPHILS # BLD AUTO: 10.46 K/UL — HIGH (ref 1.8–7.4)
NEUTROPHILS NFR BLD AUTO: 79.2 % — HIGH (ref 43–77)
NRBC # BLD: 0 /100 WBCS — SIGNIFICANT CHANGE UP (ref 0–0)
NRBC # FLD: 0 K/UL — SIGNIFICANT CHANGE UP (ref 0–0)
PLATELET # BLD AUTO: 179 K/UL — SIGNIFICANT CHANGE UP (ref 150–400)
POTASSIUM SERPL-MCNC: 4.2 MMOL/L — SIGNIFICANT CHANGE UP (ref 3.5–5.3)
POTASSIUM SERPL-SCNC: 4.2 MMOL/L — SIGNIFICANT CHANGE UP (ref 3.5–5.3)
PROT SERPL-MCNC: 7.1 G/DL — SIGNIFICANT CHANGE UP (ref 6–8.3)
RBC # BLD: 3.68 M/UL — LOW (ref 3.8–5.2)
RBC # FLD: 13.4 % — SIGNIFICANT CHANGE UP (ref 10.3–14.5)
SODIUM SERPL-SCNC: 140 MMOL/L — SIGNIFICANT CHANGE UP (ref 135–145)
SOURCE GROUP B STREP: SIGNIFICANT CHANGE UP
SPECIMEN SOURCE: SIGNIFICANT CHANGE UP
URATE SERPL-MCNC: 3.5 MG/DL — SIGNIFICANT CHANGE UP (ref 2.5–7)
WBC # BLD: 13.21 K/UL — HIGH (ref 3.8–10.5)
WBC # FLD AUTO: 13.21 K/UL — HIGH (ref 3.8–10.5)

## 2024-06-19 PROCEDURE — 59025 FETAL NON-STRESS TEST: CPT | Mod: 26,GC

## 2024-06-19 PROCEDURE — 99232 SBSQ HOSP IP/OBS MODERATE 35: CPT | Mod: GC,25

## 2024-06-19 RX ORDER — AMOXICILLIN 500 MG
500 CAPSULE ORAL EVERY 8 HOURS
Refills: 0 | Status: COMPLETED | OUTPATIENT
Start: 2024-06-19

## 2024-06-19 RX ORDER — FERROUS SULFATE 325(65) MG
325 TABLET ORAL DAILY
Refills: 0 | Status: DISCONTINUED | OUTPATIENT
Start: 2024-06-19 | End: 2024-07-18

## 2024-06-19 RX ORDER — PRENATAL VIT/IRON FUM/FOLIC AC 60 MG-1 MG
1 TABLET ORAL DAILY
Refills: 0 | Status: DISCONTINUED | OUTPATIENT
Start: 2024-06-19 | End: 2024-07-18

## 2024-06-19 RX ADMIN — VALACYCLOVIR HYDROCHLORIDE 500 MILLIGRAM(S): 500 TABLET, FILM COATED ORAL at 22:01

## 2024-06-19 RX ADMIN — VALACYCLOVIR HYDROCHLORIDE 500 MILLIGRAM(S): 500 TABLET, FILM COATED ORAL at 10:32

## 2024-06-19 RX ADMIN — Medication 1 APPLICATION(S): at 10:59

## 2024-06-19 RX ADMIN — Medication 200 GRAM(S): at 17:17

## 2024-06-19 RX ADMIN — Medication 1 TABLET(S): at 10:59

## 2024-06-19 RX ADMIN — Medication 200 GRAM(S): at 10:31

## 2024-06-19 RX ADMIN — Medication 500 MILLIGRAM(S): at 10:59

## 2024-06-19 RX ADMIN — Medication 325 MILLIGRAM(S): at 10:59

## 2024-06-19 RX ADMIN — Medication 200 GRAM(S): at 22:04

## 2024-06-19 RX ADMIN — HEPARIN SODIUM 5000 UNIT(S): 50 INJECTION, SOLUTION INTRAVENOUS at 10:31

## 2024-06-19 RX ADMIN — Medication 200 GRAM(S): at 03:49

## 2024-06-19 RX ADMIN — HEPARIN SODIUM 5000 UNIT(S): 50 INJECTION, SOLUTION INTRAVENOUS at 22:01

## 2024-06-19 NOTE — PROGRESS NOTE ADULT - ATTENDING COMMENTS
MFM ATTENDING    27yo P0 at 31w0d with PPROM.     denies vb/ctx, reports normal fetal movement. endorses ongoing small volume leakage of clear fluid.     s/p acs -  s/p mag -  s/p NICU consult   fetus AGA 1766g 58% on   s/p indocin    currently low suspicion for infection or acute abruption at this time.   Continue latency abx  Continue valtrex  Continue holter monitor though asymptomatic now.     Discussed PPROM including risks of onset of spontaneous labor, infection, abruption,  birth.   Recommend goal for delivery at 34 weeks. Delivery may be indicated sooner if there is a change in maternal and/or fetal status. MFM ATTENDING    27yo P0 at 31w0d with PPROM.     denies vb/ctx, reports normal fetal movement. endorses ongoing small volume leakage of clear fluid.     s/p acs -  s/p mag -  s/p NICU consult   fetus AGA 1766g 58% on   s/p indocin     / mod / +a / no decels  [place]: irritability      wbc 13, no maternal/fetal tachycardia, uterus not tender. denies vb. low suspicion for infection or acute abruption at this time.     Continue latency abx  Continue valtrex  Continue holter monitor though asymptomatic now.     Discussed PPROM including risks of onset of spontaneous labor, infection, abruption,  birth.   Recommend goal for delivery at 34 weeks. Delivery may be indicated sooner if there is a change in maternal and/or fetal status. MFM ATTENDING    27yo P0 at 31w0d with PPROM.     denies vb/ctx, reports normal fetal movement. endorses ongoing small volume leakage of clear fluid.     s/p acs -  s/p mag -  s/p NICU consult   fetus AGA 1766g 58% on   s/p indocin     / mod / +a / no decels  [place]: irritability    wbc 13, no maternal/fetal tachycardia, uterus not tender. denies vb. low suspicion for infection or acute abruption at this time.     Continue latency abx  Continue valtrex  Continue holter monitor though asymptomatic now.     Discussed PPROM including risks of onset of spontaneous labor, infection, abruption,  birth.   Recommend goal for delivery at 34 weeks. Delivery may be indicated sooner if there is a change in maternal and/or fetal status.  MOD: planned vaginal, with  reserved for routine obstetrical indications.     All questions answered to patients and partners satisfaction.

## 2024-06-19 NOTE — PROGRESS NOTE ADULT - ASSESSMENT
29yo  @31wk transferred from [facility], admitted with PPROM @30wk6d on . Patient is clinically stable    #PPROM  - s/p Mg (-)  - s/p Indocin  - s/p betamethasone (-)  - Ampicilin for 48h, transition to amoxicillin      #HSV  - No lesions on SSE  - Continue Valtrex 500mg BID    #Palpitations  - Patient wearing Holter monitor, to be evaluated by Dr. Dory Reed, PGY2 27yo  @31wk transferred from [facility], admitted with PPROM @30wk6d on . Patient is clinically stable    #PPROM  - s/p Mg (-)  - s/p Indocin  - s/p betamethasone (-)  - Ampicilin for 48h, transition to amoxicillin      #HSV  - No lesions on SSE  - Continue Valtrex 500mg BID    #Palpitations  - Patient wearing Holter monitor, to be evaluated by Dr. Connors    #Maternal wellbeing  - Contraception: declines  - Regular diet    Harika Reed, PGY2

## 2024-06-19 NOTE — PROGRESS NOTE ADULT - SUBJECTIVE AND OBJECTIVE BOX
R3 Antepartum Note    Patient seen and examined at bedside, no acute overnight events. No acute complaints. Pt reports +FM, denies VB, ctx, HA, epigastric pain, blurred vision, CP, SOB, N/V, fevers, and chills.    Vital Signs Last 24 Hours  T(C): 37.1 (06-19-24 @ 05:18), Max: 37.1 (06-19-24 @ 01:13)  HR: 77 (06-19-24 @ 05:19) (75 - 104)  BP: 99/54 (06-19-24 @ 05:19) (99/54 - 132/65)  RR: 17 (06-19-24 @ 05:18) (16 - 18)  SpO2: 98% (06-19-24 @ 05:18) (95% - 100%)    CAPILLARY BLOOD GLUCOSE          Physical Exam:  General: NAD  Abdomen: Soft, non-tender, gravid  Ext: No pain or swelling    NST reactive overnight    Labs:             11.6   11.82 )-----------( 200      ( 06-17 @ 18:15 )             33.9       Mg     4.30     06-18 @ 09:42      PT/INR - ( 06-17 @ 18:10 )   PT: 10.3 sec;   INR: 0.90 ratio          MEDICATIONS  (STANDING):  ampicillin  IVPB 2 Gram(s) IV Intermittent every 6 hours  ampicillin  IVPB      chlorhexidine 2% Cloths 1 Application(s) Topical <User Schedule>  heparin   Injectable 5000 Unit(s) SubCutaneous every 12 hours  valACYclovir 500 milliGRAM(s) Oral two times a day    MEDICATIONS  (PRN):

## 2024-06-20 LAB
CULTURE RESULTS: ABNORMAL
SPECIMEN SOURCE: SIGNIFICANT CHANGE UP

## 2024-06-20 PROCEDURE — 99232 SBSQ HOSP IP/OBS MODERATE 35: CPT

## 2024-06-20 RX ORDER — AMOXICILLIN 500 MG
500 CAPSULE ORAL EVERY 8 HOURS
Refills: 0 | Status: COMPLETED | OUTPATIENT
Start: 2024-06-20 | End: 2024-06-24

## 2024-06-20 RX ADMIN — VALACYCLOVIR HYDROCHLORIDE 500 MILLIGRAM(S): 500 TABLET, FILM COATED ORAL at 21:02

## 2024-06-20 RX ADMIN — Medication 1 APPLICATION(S): at 10:16

## 2024-06-20 RX ADMIN — Medication 500 MILLIGRAM(S): at 15:08

## 2024-06-20 RX ADMIN — Medication 500 MILLIGRAM(S): at 21:03

## 2024-06-20 RX ADMIN — Medication 500 MILLIGRAM(S): at 06:05

## 2024-06-20 RX ADMIN — HEPARIN SODIUM 5000 UNIT(S): 50 INJECTION, SOLUTION INTRAVENOUS at 10:17

## 2024-06-20 RX ADMIN — Medication 325 MILLIGRAM(S): at 10:17

## 2024-06-20 RX ADMIN — HEPARIN SODIUM 5000 UNIT(S): 50 INJECTION, SOLUTION INTRAVENOUS at 21:04

## 2024-06-20 RX ADMIN — Medication 500 MILLIGRAM(S): at 10:17

## 2024-06-20 RX ADMIN — Medication 1 TABLET(S): at 10:16

## 2024-06-20 RX ADMIN — VALACYCLOVIR HYDROCHLORIDE 500 MILLIGRAM(S): 500 TABLET, FILM COATED ORAL at 10:17

## 2024-06-20 NOTE — PROGRESS NOTE ADULT - ATTENDING COMMENTS
GLENROY Attending Addendum:   Cecilio is a 29 yo  at 31w1d admitted for PPROM, now on D4 of latency abx. S/p mag and BMZ, now continuing with latency. Stable. NO 2x2 pocked noted and thus for 1hr TID monitoring. Otherwise doing well, with no e/o IAI at this time. Hx of HSV, reports prodromal symptoms, Valtrex initiated. GBS positive (GBS bacteriuria)     DAVID Abdul MD

## 2024-06-20 NOTE — PROGRESS NOTE ADULT - NSPROGADDITIONALINFOA_GEN_ALL_CORE
MELISSAM Fellow Addendum    Patient is a 27 yo  at 31w1d admitted for PPROM, currently day 4 of latency antibiotics, with no signs/symptoms of intra-amniotic infection.   Fetus is vertex, normally-grown (EFW 1766g), and patient is s/p betamethasone -. Oligohydramnios with no 2x2 pocket present, undergoing twice daily NST and twice weekly BPP.   GBS is positive, with infectious evaluation for PPROM negative thus far, patient aware of need for GBS prophylaxis.   Concurrent antepartum issues including Rh negative, HSV with no current outbreak on Valtrex for prophylaxis, and palpitations status post Holter monitor (pending read). Will obtain echocardiogram this admission and send Holter monitor to cardio-OB for evaluation. Prenatal care is up to date, s/p Tdap and GCT. Patient undecided on contraceptive methods.   Continue inpatient management for PPROM, with plan for delivery at 34-36 weeks, with earlier delivery depending on maternal and fetal status.    NST: 150 bpm, moderate variability, +accelerations, no decelerations  No contractions on toco  Reactive NST      Barbara Mason MD   Patient seen with GLENROY Fuchs attending MELISSAM Fellow Addendum    Patient is a 29 yo  at 31w1d admitted for PPROM, currently day 4 of latency antibiotics, with no signs/symptoms of intra-amniotic infection.   Fetus is vertex, normally-grown (EFW 1766g), and patient is s/p betamethasone -. Oligohydramnios with no 2x2 pocket present, undergoing twice daily NST and twice weekly BPP.   GBS is positive, with infectious evaluation for PPROM negative thus far, patient aware of need for GBS prophylaxis.   Concurrent antepartum issues including Rh negative, HSV with no current outbreak on Valtrex for prophylaxis, and palpitations status post Holter monitor (pending read). Will send Holter monitor to cardio-OB for evaluation, patient previously had echocardiogram performed in this pregnancy. Prenatal care is up to date, s/p Tdap and GCT. Patient undecided on contraceptive methods.   Continue inpatient management for PPROM, with plan for delivery at 34-36 weeks, with earlier delivery depending on maternal and fetal status.    NST: 150 bpm, moderate variability, +accelerations, no decelerations  No contractions on toco  Reactive NST      Barbara Mason MD   Patient seen with GLENROY Fuchs attending

## 2024-06-20 NOTE — PROGRESS NOTE ADULT - SUBJECTIVE AND OBJECTIVE BOX
R3 Antepartum Note    Patient seen and examined at bedside, no acute overnight events. No acute complaints. Pt reports +FM, denies LOF, VB, ctx, HA, epigastric pain, blurred vision, CP, SOB, N/V, fevers, and chills.    Vital Signs Last 24 Hours  T(C): 36.8 (06-20-24 @ 01:32), Max: 37.1 (06-19-24 @ 05:18)  HR: 78 (06-20-24 @ 05:06) (75 - 94)  BP: 111/53 (06-20-24 @ 01:32) (99/54 - 135/65)  RR: 17 (06-20-24 @ 01:32) (14 - 17)  SpO2: 98% (06-20-24 @ 01:32) (94% - 100%)    CAPILLARY BLOOD GLUCOSE          Physical Exam:  General: NAD  Abdomen: Soft, non-tender, gravid  Ext: No pain or swelling    NST reactive overnight    Labs:             10.7   13.21 )-----------( 179      ( 06-19 @ 08:03 )             31.1     06-19 @ 08:03    140  |  106  |  8   ----------------------------<  103  4.2   |  21  |  0.46    Ca    9.4      06-19 @ 08:03  Mg     4.30     06-18 @ 09:42    TPro  7.1  /  Alb  3.8  /  TBili  0.3  /  DBili  x   /  AST  17  /  ALT  15  /  AlkPhos  74  06-19 @ 08:03    PT/INR - ( 06-17 @ 18:10 )   PT: 10.3 sec;   INR: 0.90 ratio      Uric Acid: (06-19 @ 08:03)  3.5      Fibrinogen: (06-19 @ 08:03)  --       LDH: (06-19 @ 08:03)  186        MEDICATIONS  (STANDING):  amoxicillin 500 milliGRAM(s) Oral every 8 hours  ascorbic acid 500 milliGRAM(s) Oral daily  chlorhexidine 2% Cloths 1 Application(s) Topical <User Schedule>  ferrous    sulfate 325 milliGRAM(s) Oral daily  heparin   Injectable 5000 Unit(s) SubCutaneous every 12 hours  prenatal multivitamin 1 Tablet(s) Oral daily  valACYclovir 500 milliGRAM(s) Oral two times a day    MEDICATIONS  (PRN):   R3 Antepartum Note    Patient seen and examined at bedside, no acute overnight events. No acute complaints. Pt reports +FM, denies LOF, VB, ctx, HA, epigastric pain, blurred vision, CP, SOB, N/V, fevers, and chills.    Vital Signs Last 24 Hours  T(C): 36.8 (06-20-24 @ 01:32), Max: 37.1 (06-19-24 @ 05:18)  HR: 78 (06-20-24 @ 05:06) (75 - 94)  BP: 111/53 (06-20-24 @ 01:32) (99/54 - 135/65)  RR: 17 (06-20-24 @ 01:32) (14 - 17)  SpO2: 98% (06-20-24 @ 01:32) (94% - 100%)    Physical Exam:  General: NAD  Abdomen: Soft, non-tender, gravid  Ext: No pain or swelling    NST reactive overnight    Labs:             10.7   13.21 )-----------( 179      ( 06-19 @ 08:03 )             31.1     06-19 @ 08:03    140  |  106  |  8   ----------------------------<  103  4.2   |  21  |  0.46    Ca    9.4      06-19 @ 08:03  Mg     4.30     06-18 @ 09:42    TPro  7.1  /  Alb  3.8  /  TBili  0.3  /  DBili  x   /  AST  17  /  ALT  15  /  AlkPhos  74  06-19 @ 08:03    PT/INR - ( 06-17 @ 18:10 )   PT: 10.3 sec;   INR: 0.90 ratio      Uric Acid: (06-19 @ 08:03)  3.5      Fibrinogen: (06-19 @ 08:03)  --       LDH: (06-19 @ 08:03)  186        MEDICATIONS  (STANDING):  amoxicillin 500 milliGRAM(s) Oral every 8 hours  ascorbic acid 500 milliGRAM(s) Oral daily  chlorhexidine 2% Cloths 1 Application(s) Topical <User Schedule>  ferrous    sulfate 325 milliGRAM(s) Oral daily  heparin   Injectable 5000 Unit(s) SubCutaneous every 12 hours  prenatal multivitamin 1 Tablet(s) Oral daily  valACYclovir 500 milliGRAM(s) Oral two times a day    MEDICATIONS  (PRN):

## 2024-06-20 NOTE — PROGRESS NOTE ADULT - ASSESSMENT
29yo  @31.1wk transferred from [facility], admitted with PPROM @30wk5d on . Patient is clinically stable    #PPROM  - s/p Mg (-)  - s/p Indocin  - s/p betamethasone (-)  - s/p ampicillin (-)  - On amoxicillin (-) for 5 days  - Afebrile, no signs/symptoms of infection      #HSV  - No lesions on SSE  - Continue Valtrex 500mg BID    #Palpitations  - Patient wearing Holter monitor, to be evaluated by Dr. Connors    #Maternal wellbeing  - Contraception: deciding  - Regular diet    Harika Reed, PGY2   29yo  @31.1wk transferred from [facility], admitted with PPROM @30wk5d on . Patient is clinically stable    #PPROM  - s/p Mg (-)  - s/p Indocin  - s/p betamethasone (-)  - s/p ampicillin (-)  - On amoxicillin (-) for 5 days  - Afebrile, no signs/symptoms of infection    #HSV  - No lesions on SSE  - Continue Valtrex 500mg BID    #Palpitations  - Patient wearing Holter monitor, to be evaluated by Dr. Connors    #Maternal wellbeing  - Contraception: deciding  - Regular diet    Harika Reed, PGY2

## 2024-06-21 ENCOUNTER — APPOINTMENT (OUTPATIENT)
Dept: ANTEPARTUM | Facility: CLINIC | Age: 29
End: 2024-06-21
Payer: MEDICAID

## 2024-06-21 LAB
BASOPHILS # BLD AUTO: 0.07 K/UL — SIGNIFICANT CHANGE UP (ref 0–0.2)
BASOPHILS NFR BLD AUTO: 0.6 % — SIGNIFICANT CHANGE UP (ref 0–2)
BLD GP AB SCN SERPL QL: POSITIVE — SIGNIFICANT CHANGE UP
EOSINOPHIL # BLD AUTO: 0.06 K/UL — SIGNIFICANT CHANGE UP (ref 0–0.5)
EOSINOPHIL NFR BLD AUTO: 0.5 % — SIGNIFICANT CHANGE UP (ref 0–6)
HCT VFR BLD CALC: 31.8 % — LOW (ref 34.5–45)
HGB BLD-MCNC: 10.3 G/DL — LOW (ref 11.5–15.5)
IANC: 6.67 K/UL — SIGNIFICANT CHANGE UP (ref 1.8–7.4)
IMM GRANULOCYTES NFR BLD AUTO: 7.4 % — HIGH (ref 0–0.9)
LYMPHOCYTES # BLD AUTO: 2.46 K/UL — SIGNIFICANT CHANGE UP (ref 1–3.3)
LYMPHOCYTES # BLD AUTO: 22.3 % — SIGNIFICANT CHANGE UP (ref 13–44)
MCHC RBC-ENTMCNC: 28.8 PG — SIGNIFICANT CHANGE UP (ref 27–34)
MCHC RBC-ENTMCNC: 32.4 GM/DL — SIGNIFICANT CHANGE UP (ref 32–36)
MCV RBC AUTO: 88.8 FL — SIGNIFICANT CHANGE UP (ref 80–100)
MONOCYTES # BLD AUTO: 0.95 K/UL — HIGH (ref 0–0.9)
MONOCYTES NFR BLD AUTO: 8.6 % — SIGNIFICANT CHANGE UP (ref 2–14)
NEUTROPHILS # BLD AUTO: 6.67 K/UL — SIGNIFICANT CHANGE UP (ref 1.8–7.4)
NEUTROPHILS NFR BLD AUTO: 60.6 % — SIGNIFICANT CHANGE UP (ref 43–77)
NRBC # BLD: 0 /100 WBCS — SIGNIFICANT CHANGE UP (ref 0–0)
NRBC # FLD: 0.02 K/UL — HIGH (ref 0–0)
PLATELET # BLD AUTO: 179 K/UL — SIGNIFICANT CHANGE UP (ref 150–400)
RBC # BLD: 3.58 M/UL — LOW (ref 3.8–5.2)
RBC # FLD: 13.2 % — SIGNIFICANT CHANGE UP (ref 10.3–14.5)
RH IG SCN BLD-IMP: NEGATIVE — SIGNIFICANT CHANGE UP
WBC # BLD: 11.03 K/UL — HIGH (ref 3.8–10.5)
WBC # FLD AUTO: 11.03 K/UL — HIGH (ref 3.8–10.5)

## 2024-06-21 PROCEDURE — 76819 FETAL BIOPHYS PROFIL W/O NST: CPT | Mod: 26

## 2024-06-21 PROCEDURE — 86077 PHYS BLOOD BANK SERV XMATCH: CPT

## 2024-06-21 PROCEDURE — 99232 SBSQ HOSP IP/OBS MODERATE 35: CPT | Mod: GC

## 2024-06-21 RX ADMIN — Medication 500 MILLIGRAM(S): at 14:53

## 2024-06-21 RX ADMIN — Medication 500 MILLIGRAM(S): at 11:20

## 2024-06-21 RX ADMIN — Medication 1 TABLET(S): at 11:20

## 2024-06-21 RX ADMIN — Medication 325 MILLIGRAM(S): at 11:21

## 2024-06-21 RX ADMIN — HEPARIN SODIUM 5000 UNIT(S): 50 INJECTION, SOLUTION INTRAVENOUS at 11:20

## 2024-06-21 RX ADMIN — VALACYCLOVIR HYDROCHLORIDE 500 MILLIGRAM(S): 500 TABLET, FILM COATED ORAL at 21:46

## 2024-06-21 RX ADMIN — HEPARIN SODIUM 5000 UNIT(S): 50 INJECTION, SOLUTION INTRAVENOUS at 21:45

## 2024-06-21 RX ADMIN — Medication 500 MILLIGRAM(S): at 21:45

## 2024-06-21 RX ADMIN — Medication 1 APPLICATION(S): at 11:18

## 2024-06-21 RX ADMIN — VALACYCLOVIR HYDROCHLORIDE 500 MILLIGRAM(S): 500 TABLET, FILM COATED ORAL at 11:21

## 2024-06-21 RX ADMIN — Medication 500 MILLIGRAM(S): at 05:12

## 2024-06-21 NOTE — PROGRESS NOTE ADULT - ATTENDING COMMENTS
MFM ATTENDING    29yo P0 at 31w2d with PPROM.     denies vb/ctx, reports normal fetal movement. endorses ongoing small volume leakage of clear fluid.     s/p acs -  s/p mag -  s/p NICU consult   fetus AGA 1766g 58% on   s/p indocin     / mod / +a / no decels  [place]: irritability    wbc 13 --> 11, no maternal/fetal tachycardia, uterus not tender. denies vb. low suspicion for infection or acute abruption at this time.     Continue latency abx  Continue valtrex  Holter completed and patient mailed in.     Continue current management.   Recommend goal for delivery at 34 weeks. Delivery may be indicated sooner if there is a change in maternal and/or fetal status.  MOD: planned vaginal, with  reserved for routine obstetrical indications.     All questions answered to patients satisfaction.

## 2024-06-21 NOTE — PROGRESS NOTE ADULT - SUBJECTIVE AND OBJECTIVE BOX
R3 Antepartum Note    Patient seen and examined at bedside, no acute overnight events. No acute complaints. She reports small leakage of clear fluid. Pt reports +FM, denies VB, ctx, HA, epigastric pain, blurred vision, CP, SOB, N/V, fevers, and chills.    Vital Signs Last 24 Hours  T(C): 36.9 (06-21-24 @ 09:27), Max: 37.1 (06-20-24 @ 13:45)  HR: 85 (06-21-24 @ 09:27) (78 - 89)  BP: 113/64 (06-21-24 @ 09:27) (106/54 - 124/66)  RR: 16 (06-21-24 @ 09:27) (16 - 17)  SpO2: 98% (06-21-24 @ 09:27) (98% - 100%)    CAPILLARY BLOOD GLUCOSE          Physical Exam:  General: NAD  Abdomen: Soft, non-tender, gravid  : scant clear fluid on pad  Ext: No pain or swelling    NST reactive overnight    Labs:             10.3   11.03 )-----------( 179      ( 06-21 @ 05:00 )             31.8               Uric Acid: (06-19 @ 08:03)  3.5      Fibrinogen: (06-19 @ 08:03)  --       LDH: (06-19 @ 08:03)  186        MEDICATIONS  (STANDING):  amoxicillin 500 milliGRAM(s) Oral every 8 hours  ascorbic acid 500 milliGRAM(s) Oral daily  chlorhexidine 2% Cloths 1 Application(s) Topical <User Schedule>  ferrous    sulfate 325 milliGRAM(s) Oral daily  heparin   Injectable 5000 Unit(s) SubCutaneous every 12 hours  prenatal multivitamin 1 Tablet(s) Oral daily  valACYclovir 500 milliGRAM(s) Oral two times a day    MEDICATIONS  (PRN):

## 2024-06-21 NOTE — PROGRESS NOTE ADULT - ASSESSMENT
29yo  @31.2wk transferred from [facility], admitted with PPROM @30wk5d on . Patient is clinically stable with no signs of infection or PTL.    #PPROM  - s/p Mg (-)  - s/p Indocin  - s/p betamethasone (-)  - s/p ampicillin (-)  - On amoxicillin (-) for 5 days  - Afebrile, no signs/symptoms of infection    #HSV  - No lesions on SSE  - Continue Valtrex 500mg BID    #Palpitations  - Outpatient TTE (): EF 65-70%, V ventricular systolic function hyperdynamic. No valvular disease  - s/p Holter monitor  - Follow up Cardio OB recs    #Maternal wellbeing  - Contraception: deciding  - Regular diet    Harika Reed, PGY2

## 2024-06-22 LAB
BLD GP AB SCN SERPL QL: POSITIVE — SIGNIFICANT CHANGE UP
RH IG SCN BLD-IMP: NEGATIVE — SIGNIFICANT CHANGE UP

## 2024-06-22 PROCEDURE — 99233 SBSQ HOSP IP/OBS HIGH 50: CPT

## 2024-06-22 RX ADMIN — Medication 325 MILLIGRAM(S): at 10:02

## 2024-06-22 RX ADMIN — Medication 500 MILLIGRAM(S): at 06:43

## 2024-06-22 RX ADMIN — Medication 500 MILLIGRAM(S): at 13:36

## 2024-06-22 RX ADMIN — HEPARIN SODIUM 5000 UNIT(S): 50 INJECTION, SOLUTION INTRAVENOUS at 22:13

## 2024-06-22 RX ADMIN — Medication 1 APPLICATION(S): at 06:43

## 2024-06-22 RX ADMIN — Medication 500 MILLIGRAM(S): at 22:13

## 2024-06-22 RX ADMIN — VALACYCLOVIR HYDROCHLORIDE 500 MILLIGRAM(S): 500 TABLET, FILM COATED ORAL at 22:13

## 2024-06-22 RX ADMIN — VALACYCLOVIR HYDROCHLORIDE 500 MILLIGRAM(S): 500 TABLET, FILM COATED ORAL at 10:01

## 2024-06-22 RX ADMIN — Medication 500 MILLIGRAM(S): at 10:02

## 2024-06-22 RX ADMIN — Medication 1 TABLET(S): at 10:02

## 2024-06-22 RX ADMIN — HEPARIN SODIUM 5000 UNIT(S): 50 INJECTION, SOLUTION INTRAVENOUS at 10:01

## 2024-06-22 NOTE — PROGRESS NOTE ADULT - ATTENDING COMMENTS
MFM Attending Note  Patient seen at bedside and states clear leakage of fluid but minimal. She reports normal movement and denies contractions or vaginal bleeding.    ICU Vital Signs Last 24 Hrs  T(C): 36.7 (2024 10:14), Max: 37.1 (2024 13:35)  T(F): 98 (2024 10:14), Max: 98.8 (2024 13:35)  HR: 91 (2024 10:14) (75 - 91)  BP: 106/55 (2024 10:14) (106/55 - 120/62)  BP(mean): --  ABP: --  ABP(mean): --  RR: 16 (2024 10:14) (16 - 17)  SpO2: 98% (2024 10:14) (98% - 99%)    O2 Parameters below as of 2024 10:14  Patient On (Oxygen Delivery Method): room air                          10.3   11.03 )-----------( 179      ( 2024 05:00 )             31.8     EFM: 145, mod, + accel, -decel  toco: quiet    a/p:  28 y.o.  at 31weeks, PPROM  # PPROM  # Primigravid  # GBS positive  # History of short cervix, s/p vaginal progesterone  # Rh negative, s/p rhogram 24    Patient admitted for PPROM, s/p betamethasone - and magnesium sulfate, continuing latency antibiotics, s/p NICU consultation  No signs of chorio or  labor, continued in hospital care with NST BID and additional care as needed  Candidate for expectant management until 34 weeks unless change in maternal or fetal status  Patient questions at bedside

## 2024-06-22 NOTE — PROGRESS NOTE ADULT - SUBJECTIVE AND OBJECTIVE BOX
R3 Antepartum Note    Patient seen and examined at bedside, no acute overnight events. No acute complaints. Pt reports intermittent leakage of clear fluid. Pt reports +FM, denies VB, ctx, HA, epigastric pain, blurred vision, CP, SOB, N/V, fevers, and chills.    Vital Signs Last 24 Hours  T(C): 36.7 (06-22-24 @ 01:14), Max: 37.1 (06-21-24 @ 13:35)  HR: 75 (06-22-24 @ 01:14) (75 - 86)  BP: 113/61 (06-22-24 @ 01:14) (109/62 - 120/62)  RR: 17 (06-22-24 @ 01:14) (16 - 17)  SpO2: 98% (06-22-24 @ 01:14) (98% - 99%)    I&O's Summary    20 Jun 2024 07:01  -  21 Jun 2024 07:00  --------------------------------------------------------  IN: 0 mL / OUT: 775 mL / NET: -775 mL      Physical Exam:  General: NAD  Abdomen: Soft, non-tender, gravid  : scant clear fluid on pad  Ext: No pain or swelling  NST reactive overnight    Labs:                        10.3   11.03 )-----------( 179      ( 21 Jun 2024 05:00 )             31.8   baso 0.6    eos 0.5    imm gran 7.4    lymph 22.3   mono 8.6    poly 60.6                         10.7   13.21 )-----------( 179      ( 19 Jun 2024 08:03 )             31.1   baso 0.2    eos 0.1    imm gran 2.5    lymph 13.0   mono 5.0    poly 79.2       MEDICATIONS  (STANDING):  amoxicillin 500 milliGRAM(s) Oral every 8 hours  ascorbic acid 500 milliGRAM(s) Oral daily  chlorhexidine 2% Cloths 1 Application(s) Topical <User Schedule>  ferrous    sulfate 325 milliGRAM(s) Oral daily  heparin   Injectable 5000 Unit(s) SubCutaneous every 12 hours  prenatal multivitamin 1 Tablet(s) Oral daily  valACYclovir 500 milliGRAM(s) Oral two times a day    MEDICATIONS  (PRN):

## 2024-06-22 NOTE — PROGRESS NOTE ADULT - ASSESSMENT
A/P: 27yo  @31.3wk transferred from [facility], admitted with PPROM @30wk5d on . Patient is clinically stable with no signs of infection or PTL.    #PPROM  - s/p Mg (-)  - s/p Indocin  - s/p betamethasone (-)  - s/p ampicillin (-)  - On amoxicillin (-) for 5 days  - Afebrile, no signs/symptoms of infection    #HSV  - No lesions on SSE  - Continue Valtrex 500mg BID    #Palpitations  - Outpatient TTE (): EF 65-70%, V ventricular systolic function hyperdynamic. No valvular disease  - s/p Holter monitor  - Follow up Cardio OB recs    #Maternal wellbeing  - Contraception: deciding  - Regular diet    VTimmel PGY3 A/P: 27yo  @31.3wk transferred from [facility], admitted with PPROM @30wk5d on . Patient is clinically stable with no signs of infection or PTL.    #PPROM  - s/p Mg (-)  - s/p Indocin  - s/p betamethasone (-)  - s/p ampicillin (-)  - On amoxicillin (-) for 5 days  - Afebrile, no signs/symptoms of infection    #HSV  - No lesions on SSE  - Continue Valtrex 500mg BID    #Palpitations  - Outpatient TTE (): EF 65-70%, V ventricular systolic function hyperdynamic. No valvular disease  - s/p Holter monitor  - Follow up Cardio OB recs    #Fetal wellbeing  - NST BID   - ATU (): bertex, posterior placenta, JUAN 4.31, BPP 8/8    #Maternal wellbeing  - Contraception: deciding  - Regular diet    VTimmel PGY3

## 2024-06-23 PROCEDURE — 99232 SBSQ HOSP IP/OBS MODERATE 35: CPT

## 2024-06-23 RX ADMIN — HEPARIN SODIUM 5000 UNIT(S): 50 INJECTION, SOLUTION INTRAVENOUS at 21:31

## 2024-06-23 RX ADMIN — Medication 500 MILLIGRAM(S): at 21:31

## 2024-06-23 RX ADMIN — VALACYCLOVIR HYDROCHLORIDE 500 MILLIGRAM(S): 500 TABLET, FILM COATED ORAL at 21:32

## 2024-06-23 RX ADMIN — VALACYCLOVIR HYDROCHLORIDE 500 MILLIGRAM(S): 500 TABLET, FILM COATED ORAL at 10:11

## 2024-06-23 RX ADMIN — Medication 500 MILLIGRAM(S): at 13:43

## 2024-06-23 RX ADMIN — Medication 500 MILLIGRAM(S): at 06:16

## 2024-06-23 RX ADMIN — HEPARIN SODIUM 5000 UNIT(S): 50 INJECTION, SOLUTION INTRAVENOUS at 10:11

## 2024-06-23 RX ADMIN — Medication 1 APPLICATION(S): at 06:16

## 2024-06-23 RX ADMIN — Medication 1 TABLET(S): at 10:11

## 2024-06-23 RX ADMIN — Medication 500 MILLIGRAM(S): at 10:11

## 2024-06-23 RX ADMIN — Medication 325 MILLIGRAM(S): at 10:11

## 2024-06-23 NOTE — PROGRESS NOTE ADULT - SUBJECTIVE AND OBJECTIVE BOX
R3 Antepartum Note, HD#6    Patient seen and examined at bedside, no acute overnight events. No acute complaints. Pt reports +FM, denies VB, ctx, HA, epigastric pain, blurred vision, CP, SOB, N/V, fevers, and chills. Reports minimal clear leakage intermittently.    Vital Signs Last 24 Hours  T(C): 36.8 (06-23-24 @ 05:40), Max: 37.2 (06-22-24 @ 13:43)  HR: 88 (06-23-24 @ 05:40) (80 - 91)  BP: 107/59 (06-23-24 @ 05:40) (100/58 - 121/67)  RR: 17 (06-23-24 @ 05:40) (16 - 18)  SpO2: 99% (06-23-24 @ 05:40) (97% - 100%)    CAPILLARY BLOOD GLUCOSE        Physical Exam:  General: NAD  Abdomen: Soft, non-tender, gravid  Ext: No pain or swelling    NST reactive overnight    Labs:                MEDICATIONS  (STANDING):  amoxicillin 500 milliGRAM(s) Oral every 8 hours  ascorbic acid 500 milliGRAM(s) Oral daily  chlorhexidine 2% Cloths 1 Application(s) Topical <User Schedule>  ferrous    sulfate 325 milliGRAM(s) Oral daily  heparin   Injectable 5000 Unit(s) SubCutaneous every 12 hours  prenatal multivitamin 1 Tablet(s) Oral daily  valACYclovir 500 milliGRAM(s) Oral two times a day    MEDICATIONS  (PRN):   R3 Antepartum Note, HD#6    Patient seen and examined at bedside, no acute overnight events. No acute complaints. Pt reports +FM, denies VB, ctx, HA, epigastric pain, blurred vision, CP, SOB, N/V, fevers, and chills. Reports minimal clear leakage intermittently.    Vital Signs Last 24 Hours  T(C): 36.8 (06-23-24 @ 05:40), Max: 37.2 (06-22-24 @ 13:43)  HR: 88 (06-23-24 @ 05:40) (80 - 91)  BP: 107/59 (06-23-24 @ 05:40) (100/58 - 121/67)  RR: 17 (06-23-24 @ 05:40) (16 - 18)  SpO2: 99% (06-23-24 @ 05:40) (97% - 100%)      Physical Exam:  General: NAD  Abdomen: Soft, non-tender, gravid  Ext: No pain or swelling    NST reactive overnight    Labs:                MEDICATIONS  (STANDING):  amoxicillin 500 milliGRAM(s) Oral every 8 hours  ascorbic acid 500 milliGRAM(s) Oral daily  chlorhexidine 2% Cloths 1 Application(s) Topical <User Schedule>  ferrous    sulfate 325 milliGRAM(s) Oral daily  heparin   Injectable 5000 Unit(s) SubCutaneous every 12 hours  prenatal multivitamin 1 Tablet(s) Oral daily  valACYclovir 500 milliGRAM(s) Oral two times a day    MEDICATIONS  (PRN):   R3 Antepartum Note, HD#6    Patient seen and examined at bedside, no acute overnight events. No acute complaints. Pt reports +FM, denies VB, ctx, HA, epigastric pain, blurred vision, CP, SOB, N/V, fevers, and chills. Reports minimal clear leakage intermittently.    Vital Signs Last 24 Hours  T(C): 36.8 (06-23-24 @ 05:40), Max: 37.2 (06-22-24 @ 13:43)  HR: 88 (06-23-24 @ 05:40) (80 - 91)  BP: 107/59 (06-23-24 @ 05:40) (100/58 - 121/67)  RR: 17 (06-23-24 @ 05:40) (16 - 18)  SpO2: 99% (06-23-24 @ 05:40) (97% - 100%)      Physical Exam:  General: NAD  Abdomen: Soft, non-tender, gravid  Ext: No pain or swelling    NST reactive overnight      MEDICATIONS  (STANDING):  amoxicillin 500 milliGRAM(s) Oral every 8 hours  ascorbic acid 500 milliGRAM(s) Oral daily  chlorhexidine 2% Cloths 1 Application(s) Topical <User Schedule>  ferrous    sulfate 325 milliGRAM(s) Oral daily  heparin   Injectable 5000 Unit(s) SubCutaneous every 12 hours  prenatal multivitamin 1 Tablet(s) Oral daily  valACYclovir 500 milliGRAM(s) Oral two times a day

## 2024-06-23 NOTE — PROGRESS NOTE ADULT - ASSESSMENT
A/P: 29yo  @31w4d transferred from [facility], admitted with PPROM @30wk5d on . Patient is clinically stable with no signs of infection or PTL.    #PPROM  - s/p Mg (-)  - s/p Indocin  - s/p betamethasone (-)  - s/p ampicillin (-)  - On amoxicillin (-) for 5 days  - Afebrile, no signs/symptoms of infection    #HSV  - No lesions on SSE  - Continue Valtrex 500mg BID    #Palpitations  - Outpatient TTE (): EF 65-70%, V ventricular systolic function hyperdynamic. No valvular disease  - s/p Holter monitor  - Follow up Cardio OB recs    #Fetal wellbeing  - NST BID  - ATU (): vertex, posterior placenta, JUAN 4.31, BPP 8/8    #Maternal wellbeing  - Contraception: deciding  - HSQ for DVT ppx  - Fe, Vit C  - Regular diet    Reina Scruggs, PGY3

## 2024-06-23 NOTE — PROGRESS NOTE ADULT - ATTENDING COMMENTS
MFM Attending Note  Patient seen at bedside with MFM team.  Patient states clear fluid still and denies symptoms of labor. Patient reports normal fetal movement.     ICU Vital Signs Last 24 Hrs  T(C): 37.1 (2024 09:26), Max: 37.2 (2024 13:43)  T(F): 98.7 (2024 09:26), Max: 98.9 (2024 13:43)  HR: 94 (:) (80 - 97)  BP: 118/67 (2024 09:26) (100/58 - 121/67)  BP(mean): --  ABP: --  ABP(mean): --  RR: 16 (:) (16 - 18)  SpO2: 98% (:) (97% - 100%)    O2 Parameters below as of 2024 09:26  Patient On (Oxygen Delivery Method): room air    abdomen: soft, nontender, gravid    EFM: 145, mod, + accel, -decel  toco: quiet    a/p:  28 y.o.  at 31weeks, PPROM  # PPROM  # Primigravid  # GBS positive  # History of short cervix, s/p vaginal progesterone  # Rh negative, s/p rhogram 24    Patient admitted for PPROM, s/p betamethasone - and magnesium sulfate, latency antibiotics complete today, s/p NICU consultation  No signs of chorio or  labor, continued in hospital care with NST BID and additional care as needed  Candidate for expectant management until 34 weeks unless change in maternal or fetal status  Patient questions at bedside.

## 2024-06-23 NOTE — PROGRESS NOTE ADULT - NSPROGADDITIONALINFOA_GEN_ALL_CORE
GLENROY Fellow Addendum    Patient is a 27 yo  at 31w4d admitted for PPROM, currently on last day of latency antibiotics, with no signs/symptoms of intra-amniotic infection. Fetus is vertex, normally-grown (EFW 1766g), and patient is s/p betamethasone -. Oligohydramnios with no 2x2 pocket present, undergoing twice daily NST and twice weekly BPP. GBS positive. Concurrent antepartum issues including Rh negative, HSV with no current outbreak on Valtrex for prophylaxis, and palpitations status post Holter monitor and echocardiogram with no acute maternal cardiovascular issues. Prenatal care is up to date, s/p Tdap and GCT. Patient undecided on contraceptive methods but declines tubal/IUD. Continue inpatient management for PPROM, with plan for delivery at 34-36 weeks, with earlier delivery depending on maternal and fetal status.    Barbara Mason MD   Patient seen with GLENROY Diaz attending GLENROY Fellow Addendum    Patient is a 27 yo  at 31w4d admitted for PPROM, currently on last day of latency antibiotics, with no signs/symptoms of intra-amniotic infection. Fetus is vertex, normally-grown (EFW 1766g), and patient is s/p betamethasone -. Oligohydramnios with no 2x2 pocket present, undergoing twice daily NST and twice weekly BPP. GBS positive. Concurrent antepartum issues including Rh negative, HSV with no current outbreak on Valtrex for prophylaxis, and palpitations status post Holter monitor and echocardiogram with no acute maternal cardiovascular issues. Prenatal care is up to date, s/p Tdap and GCT. Patient undecided on contraceptive methods but declines tubal or IUD. Continue inpatient management for PPROM, with planned delivery at 34-36 weeks, with earlier delivery depending on maternal and fetal status. Patient will be eligible for rescue betamethasone beginning .     NST: 145 bpm, moderate variability, +accelerations, no decelerations  Irregular contractions on toco   Reactive NST     Barbara Mason MD   Patient seen with GLENROY Diaz attending

## 2024-06-24 LAB
BASOPHILS # BLD AUTO: 0.09 K/UL — SIGNIFICANT CHANGE UP (ref 0–0.2)
BASOPHILS NFR BLD AUTO: 0.7 % — SIGNIFICANT CHANGE UP (ref 0–2)
BLD GP AB SCN SERPL QL: POSITIVE — SIGNIFICANT CHANGE UP
EOSINOPHIL # BLD AUTO: 0.12 K/UL — SIGNIFICANT CHANGE UP (ref 0–0.5)
EOSINOPHIL NFR BLD AUTO: 1 % — SIGNIFICANT CHANGE UP (ref 0–6)
HCT VFR BLD CALC: 34 % — LOW (ref 34.5–45)
HGB BLD-MCNC: 11.3 G/DL — LOW (ref 11.5–15.5)
IANC: 8.28 K/UL — HIGH (ref 1.8–7.4)
IMM GRANULOCYTES NFR BLD AUTO: 6.9 % — HIGH (ref 0–0.9)
LYMPHOCYTES # BLD AUTO: 19.3 % — SIGNIFICANT CHANGE UP (ref 13–44)
LYMPHOCYTES # BLD AUTO: 2.39 K/UL — SIGNIFICANT CHANGE UP (ref 1–3.3)
MANUAL SMEAR VERIFICATION: SIGNIFICANT CHANGE UP
MCHC RBC-ENTMCNC: 28.8 PG — SIGNIFICANT CHANGE UP (ref 27–34)
MCHC RBC-ENTMCNC: 33.2 GM/DL — SIGNIFICANT CHANGE UP (ref 32–36)
MCV RBC AUTO: 86.5 FL — SIGNIFICANT CHANGE UP (ref 80–100)
MONOCYTES # BLD AUTO: 0.67 K/UL — SIGNIFICANT CHANGE UP (ref 0–0.9)
MONOCYTES NFR BLD AUTO: 5.4 % — SIGNIFICANT CHANGE UP (ref 2–14)
NEUTROPHILS # BLD AUTO: 8.28 K/UL — HIGH (ref 1.8–7.4)
NEUTROPHILS NFR BLD AUTO: 66.7 % — SIGNIFICANT CHANGE UP (ref 43–77)
NRBC # BLD: 0 /100 WBCS — SIGNIFICANT CHANGE UP (ref 0–0)
NRBC # FLD: 0 K/UL — SIGNIFICANT CHANGE UP (ref 0–0)
PLAT MORPH BLD: NORMAL — SIGNIFICANT CHANGE UP
PLATELET # BLD AUTO: 214 K/UL — SIGNIFICANT CHANGE UP (ref 150–400)
PLATELET COUNT - ESTIMATE: NORMAL — SIGNIFICANT CHANGE UP
RBC # BLD: 3.93 M/UL — SIGNIFICANT CHANGE UP (ref 3.8–5.2)
RBC # FLD: 13.2 % — SIGNIFICANT CHANGE UP (ref 10.3–14.5)
RBC BLD AUTO: SIGNIFICANT CHANGE UP
WBC # BLD: 12.41 K/UL — HIGH (ref 3.8–10.5)
WBC # FLD AUTO: 12.41 K/UL — HIGH (ref 3.8–10.5)

## 2024-06-24 PROCEDURE — 86077 PHYS BLOOD BANK SERV XMATCH: CPT

## 2024-06-24 RX ADMIN — Medication 500 MILLIGRAM(S): at 21:01

## 2024-06-24 RX ADMIN — Medication 325 MILLIGRAM(S): at 10:02

## 2024-06-24 RX ADMIN — Medication 500 MILLIGRAM(S): at 06:03

## 2024-06-24 RX ADMIN — Medication 1 TABLET(S): at 10:03

## 2024-06-24 RX ADMIN — HEPARIN SODIUM 5000 UNIT(S): 50 INJECTION, SOLUTION INTRAVENOUS at 10:03

## 2024-06-24 RX ADMIN — VALACYCLOVIR HYDROCHLORIDE 500 MILLIGRAM(S): 500 TABLET, FILM COATED ORAL at 21:01

## 2024-06-24 RX ADMIN — Medication 500 MILLIGRAM(S): at 10:02

## 2024-06-24 RX ADMIN — Medication 1 APPLICATION(S): at 06:03

## 2024-06-24 RX ADMIN — VALACYCLOVIR HYDROCHLORIDE 500 MILLIGRAM(S): 500 TABLET, FILM COATED ORAL at 10:02

## 2024-06-24 RX ADMIN — HEPARIN SODIUM 5000 UNIT(S): 50 INJECTION, SOLUTION INTRAVENOUS at 21:03

## 2024-06-24 RX ADMIN — Medication 500 MILLIGRAM(S): at 13:44

## 2024-06-24 NOTE — PROGRESS NOTE ADULT - ASSESSMENT
A/P: 27yo  @31w6d transferred from [facility], admitted with PPROM @30wk5d on . Patient is clinically stable with no signs of infection or PTL.    #PPROM  - s/p Mg (-)  - s/p Indocin  - s/p betamethasone (-)  - Candidate for rescue BMZ on   - s/p ampicillin (-)  - On amoxicillin (-) for 5 days; last day today  - Afebrile, no signs/symptoms of infection    #HSV  - No lesions on SSE  - Continue Valtrex 500mg BID for ppx    #Palpitations  - Outpatient TTE (): EF 65-70%, V ventricular systolic function hyperdynamic. No valvular disease.  - s/p Holter monitor  - Follow up Cardio OB recs    #Fetal wellbeing  - NST BID  - ATU (): vertex, posterior placenta, JUAN 4.31, BPP 8/8    #Maternal wellbeing  - Contraception: undecided, but declines BS or IUD  - HSQ for DVT ppx  - PNV  - Fe, Vit C  - Regular diet    #Disposition  - Candidate for exp mgmt until 34w unless change in fetal or maternal status    Reina Scruggs, PGY3 A/P: 27yo  @31w6d transferred from [facility], admitted with PPROM @30wk5d on . Patient is clinically stable with no signs of infection or PTL.    #PPROM  - s/p Mg (-)  - s/p Indocin  - s/p betamethasone (-)  - Candidate for rescue BMZ on   - s/p ampicillin (-)  - On amoxicillin (-) for 5 days; last day today  - Afebrile, no signs/symptoms of infection    #HSV  - No lesions on SSE  - Continue Valtrex 500mg BID for ppx    #Palpitations  - Outpatient TTE (): EF 65-70%, V ventricular systolic function hyperdynamic. No valvular disease.  - s/p Holter monitor  - Follow up Cardio OB recs - will review Holter monitor and provide recs    #Fetal wellbeing  - NST BID  - ATU (): vertex, posterior placenta, JUAN 4.31, BPP 8/8    #Maternal wellbeing  - Contraception: undecided, but declines BS or IUD  - HSQ for DVT ppx  - PNV  - Fe, Vit C  - Regular diet    #Disposition  - Candidate for exp mgmt until delivery at 34-36w unless change in fetal or maternal status    Reina Scruggs, PGY3

## 2024-06-24 NOTE — PROGRESS NOTE ADULT - SUBJECTIVE AND OBJECTIVE BOX
R3 Antepartum Note, HD#7    Patient seen and examined at bedside, no acute overnight events. No acute complaints. Reports continued clear vaginal leakage, stable amount. Pt reports +FM, denies LOF, VB, ctx, HA, epigastric pain, blurred vision, CP, SOB, N/V, fevers, and chills.    Vital Signs Last 24 Hours  T(C): 36.8 (06-24-24 @ 05:40), Max: 37.2 (06-23-24 @ 14:31)  HR: 89 (06-24-24 @ 05:40) (88 - 97)  BP: 107/65 (06-24-24 @ 05:40) (107/65 - 121/72)  RR: 17 (06-24-24 @ 05:40) (16 - 18)  SpO2: 97% (06-24-24 @ 05:40) (96% - 98%)    CAPILLARY BLOOD GLUCOSE        Physical Exam:  General: NAD  Abdomen: Soft, non-tender, gravid  Ext: No pain or swelling    NST reactive overnight    Labs:                MEDICATIONS  (STANDING):  amoxicillin 500 milliGRAM(s) Oral every 8 hours  ascorbic acid 500 milliGRAM(s) Oral daily  chlorhexidine 2% Cloths 1 Application(s) Topical <User Schedule>  ferrous    sulfate 325 milliGRAM(s) Oral daily  heparin   Injectable 5000 Unit(s) SubCutaneous every 12 hours  prenatal multivitamin 1 Tablet(s) Oral daily  valACYclovir 500 milliGRAM(s) Oral two times a day    MEDICATIONS  (PRN):

## 2024-06-24 NOTE — PROGRESS NOTE ADULT - NSPROGADDITIONALINFOA_GEN_ALL_CORE
MELISSAM Fellow Addendum    MFM Fellow Addendum    Patient is a 27 yo  at 31w5d admitted for PPROM, currently on last day of latency antibiotics, with no signs/symptoms of intra-amniotic infection. Fetus is vertex, normally-grown (EFW 1766g), and patient is s/p betamethasone -. Oligohydramnios with no 2x2 pocket present, undergoing twice daily NST and twice weekly BPP. GBS positive. Concurrent antepartum issues including Rh negative, HSV with no current outbreak on Valtrex for prophylaxis, and palpitations status post Holter monitor and echocardiogram with no acute maternal cardiovascular issues. Prenatal care is up to date, s/p Tdap and GCT. Patient undecided on contraceptive methods but declines tubal or IUD. Continue inpatient management for PPROM, with planned delivery at 34-36 weeks, with earlier delivery depending on maternal and fetal status. Patient will be eligible for rescue betamethasone beginning .     NST: ***    Barbara Mason MD   Patient seen with GLENROY Hernandez attending GLENROY Fellow Addendum    Patient is a 27 yo  at 31w5d admitted for PPROM, currently on last day of latency antibiotics, with no signs/symptoms of intra-amniotic infection. Fetus is vertex, normally-grown (EFW 1766g), and patient is s/p betamethasone -. Oligohydramnios now resolved with 2x2 fluid pocket on most recent imaging, undergoing twice daily NST and twice weekly BPP. GBS positive. Concurrent antepartum issues including Rh negative, HSV with no current outbreak on Valtrex for prophylaxis, and palpitations status post Holter monitor and echocardiogram with no acute maternal cardiovascular issues. Patient undecided on contraceptive methods but declines tubal or IUD.     NST: 150 bpm, moderate variability, +accelerations, rare variable decelerations  No regular contractions  Reactive NST     Continue inpatient management for PPROM, with planned delivery at 34-36 weeks, with earlier delivery depending on maternal and fetal status. Patient will be eligible for rescue betamethasone beginning . 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     Barbara Mason MD   Patient seen with GLENROY Hernandez attending

## 2024-06-25 ENCOUNTER — ASOB RESULT (OUTPATIENT)
Age: 29
End: 2024-06-25

## 2024-06-25 ENCOUNTER — APPOINTMENT (OUTPATIENT)
Dept: ANTEPARTUM | Facility: CLINIC | Age: 29
End: 2024-06-25
Payer: MEDICAID

## 2024-06-25 PROBLEM — E28.2 POLYCYSTIC OVARIAN SYNDROME: Chronic | Status: ACTIVE | Noted: 2024-06-17

## 2024-06-25 PROCEDURE — 99232 SBSQ HOSP IP/OBS MODERATE 35: CPT | Mod: GC

## 2024-06-25 PROCEDURE — 76819 FETAL BIOPHYS PROFIL W/O NST: CPT | Mod: 26

## 2024-06-25 RX ADMIN — Medication 1 TABLET(S): at 11:09

## 2024-06-25 RX ADMIN — Medication 500 MILLIGRAM(S): at 11:09

## 2024-06-25 RX ADMIN — VALACYCLOVIR HYDROCHLORIDE 500 MILLIGRAM(S): 500 TABLET, FILM COATED ORAL at 21:48

## 2024-06-25 RX ADMIN — Medication 1 APPLICATION(S): at 11:08

## 2024-06-25 RX ADMIN — VALACYCLOVIR HYDROCHLORIDE 500 MILLIGRAM(S): 500 TABLET, FILM COATED ORAL at 11:09

## 2024-06-25 RX ADMIN — HEPARIN SODIUM 5000 UNIT(S): 50 INJECTION, SOLUTION INTRAVENOUS at 22:00

## 2024-06-25 RX ADMIN — HEPARIN SODIUM 5000 UNIT(S): 50 INJECTION, SOLUTION INTRAVENOUS at 11:10

## 2024-06-25 RX ADMIN — Medication 325 MILLIGRAM(S): at 11:09

## 2024-06-25 NOTE — PROGRESS NOTE ADULT - NSPROGADDITIONALINFOA_GEN_ALL_CORE
GLENROY Fellow Addendum    27 yo  at 31w6d admitted for PPROM, status post one week of antibiotics, with no signs/symptoms of intra-amniotic infection or progressing  labor. Fetus is vertex, normally-grown (EFW 1766g), and patient is s/p betamethasone -. Oligohydramnios now resolved with 2x2 fluid pocket on most recent imaging, undergoing twice daily NST and twice weekly BPP. GBS positive with plan for prophylaxis at time of delivery. Concurrent antepartum issues including Rh negative, HSV with no current outbreak on Valtrex for prophylaxis, and palpitations status post Holter monitor and echocardiogram with no acute maternal cardiovascular issues. Patient undecided on contraceptive methods but declines tubal or IUD.     NST: ***    Continue inpatient management for PPROM, with planned delivery at 34-36 weeks, with earlier delivery depending on maternal and fetal status (such as development of intra-amniotic infection, progression of  labor, indication of fetal distress). Patient now eligible for rescue betamethasone if indicated. 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     Barbara Mason MD   Patient seen with GLENROY Waters attending GLENROY Fellow Addendum    29 yo  at 31w6d admitted for PPROM, status post one week of antibiotics, with no signs/symptoms of intra-amniotic infection, placental abruption or progressing  labor. Fetus is vertex, normally-grown (EFW 1766g), and patient is s/p betamethasone -. 2x2 fluid pocket seen on most recent imaging, undergoing twice daily NST and twice weekly BPP. GBS positive with plan for prophylaxis at time of delivery. Concurrent antepartum issues including Rh negative, HSV with no current outbreak on Valtrex for prophylaxis, and palpitations status post Holter monitor and echocardiogram with no acute maternal cardiovascular issues. Patient undecided on contraceptive methods but declines tubal or IUD.     NST: 145 bpm, moderate variability, +accelerations, rare variable decelerations  Irregular contractions on toco   Reactive NST     Continue inpatient management for PPROM, with planned delivery at 34-36 weeks, with earlier delivery depending on maternal and fetal status (such as development of intra-amniotic infection, progression of  labor, indication of fetal distress). Patient now eligible for rescue betamethasone if indicated. 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     Barbara Mason MD   Patient seen with GLENROY Waters attending

## 2024-06-25 NOTE — PROGRESS NOTE ADULT - ASSESSMENT
A/P: 29yo  @31w6d transferred from [facility], admitted with PPROM @30wk5d on . Patient is clinically stable with no signs of infection or PTL.    #PPROM  - s/p Mg (-)  - s/p Indocin  - s/p betamethasone (-)  - Candidate for rescue BMZ on   - s/p ampicillin (-)  - On amoxicillin (-) for 5 days; last day today  - Afebrile, no signs/symptoms of infection    #HSV  - No lesions on SSE  - Continue Valtrex 500mg BID for ppx    #Palpitations  - Outpatient TTE (): EF 65-70%, V ventricular systolic function hyperdynamic. No valvular disease.  - s/p Holter monitor  - Follow up Cardio OB recs - will review Holter monitor and provide recs    #Fetal wellbeing  - NST BID  - ATU (): vertex, posterior placenta, JUAN 4.31, BPP 8/8    #Maternal wellbeing  - Contraception: undecided, but declines BS or IUD  - HSQ for DVT ppx  - PNV  - Fe, Vit C  - Regular diet    #Disposition  - Candidate for exp mgmt until delivery at 34-36w unless change in fetal or maternal status    Reina Scruggs, PGY3

## 2024-06-25 NOTE — PROGRESS NOTE ADULT - SUBJECTIVE AND OBJECTIVE BOX
R3 Antepartum Note, HD#8    Patient seen and examined at bedside, no acute overnight events. No acute complaints. Pt reports +FM, denies LOF, VB, ctx, HA, epigastric pain, blurred vision, CP, SOB, N/V, fevers, and chills.    Vital Signs Last 24 Hours  T(C): 36.7 (06-25-24 @ 05:27), Max: 36.8 (06-24-24 @ 09:22)  HR: 86 (06-25-24 @ 05:27) (86 - 111)  BP: 115/66 (06-25-24 @ 05:27) (115/66 - 123/69)  RR: 18 (06-25-24 @ 05:27) (16 - 18)  SpO2: 98% (06-25-24 @ 05:27) (96% - 99%)    CAPILLARY BLOOD GLUCOSE          Physical Exam:  General: NAD  Abdomen: Soft, non-tender, gravid  Ext: No pain or swelling    NST reactive overnight    Labs:             11.3   12.41 )-----------( 214      ( 06-24 @ 07:33 )             34.0                   MEDICATIONS  (STANDING):  ascorbic acid 500 milliGRAM(s) Oral daily  chlorhexidine 2% Cloths 1 Application(s) Topical <User Schedule>  ferrous    sulfate 325 milliGRAM(s) Oral daily  heparin   Injectable 5000 Unit(s) SubCutaneous every 12 hours  prenatal multivitamin 1 Tablet(s) Oral daily  valACYclovir 500 milliGRAM(s) Oral two times a day    MEDICATIONS  (PRN):   R3 Antepartum Note, HD#8    Patient seen and examined at bedside, no acute overnight events. No acute complaints. Pt reports +FM, denies LOF, VB, ctx, HA, epigastric pain, blurred vision, CP, SOB, N/V, fevers, and chills.    Vital Signs Last 24 Hours  T(C): 36.7 (06-25-24 @ 05:27), Max: 36.8 (06-24-24 @ 09:22)  HR: 86 (06-25-24 @ 05:27) (86 - 111)  BP: 115/66 (06-25-24 @ 05:27) (115/66 - 123/69)  RR: 18 (06-25-24 @ 05:27) (16 - 18)  SpO2: 98% (06-25-24 @ 05:27) (96% - 99%)    Physical Exam:  General: NAD  Abdomen: Soft, non-tender, gravid  Ext: No pain or swelling    NST reactive overnight    Labs:             11.3   12.41 )-----------( 214      ( 06-24 @ 07:33 )             34.0                   MEDICATIONS  (STANDING):  ascorbic acid 500 milliGRAM(s) Oral daily  chlorhexidine 2% Cloths 1 Application(s) Topical <User Schedule>  ferrous    sulfate 325 milliGRAM(s) Oral daily  heparin   Injectable 5000 Unit(s) SubCutaneous every 12 hours  prenatal multivitamin 1 Tablet(s) Oral daily  valACYclovir 500 milliGRAM(s) Oral two times a day    MEDICATIONS  (PRN):

## 2024-06-25 NOTE — PROGRESS NOTE ADULT - ATTENDING COMMENTS
MFM ATTENDING    29yo P0 at 31w6d with PPROM.     denies vb/ctx, reports normal fetal movement. endorses ongoing small volume leakage of clear fluid.     s/p acs -  s/p mag -  s/p NICU consult   fetus AGA 1766g 58% on   s/p indocin  s/p latency abx    Continue valtrex  Holter completed and patient mailed in. Cards eval pending (h/o palpitations).    Continue current management, routine ap care.   Recommend goal for delivery at 34 weeks. Delivery may be indicated sooner if there is a change in maternal and/or fetal status.  MOD: planned vaginal, with  reserved for routine obstetrical indications.     All questions answered to patients satisfaction

## 2024-06-26 PROCEDURE — 99232 SBSQ HOSP IP/OBS MODERATE 35: CPT

## 2024-06-26 RX ADMIN — Medication 1 TABLET(S): at 11:18

## 2024-06-26 RX ADMIN — HEPARIN SODIUM 5000 UNIT(S): 50 INJECTION, SOLUTION INTRAVENOUS at 11:18

## 2024-06-26 RX ADMIN — Medication 1 APPLICATION(S): at 06:27

## 2024-06-26 RX ADMIN — VALACYCLOVIR HYDROCHLORIDE 500 MILLIGRAM(S): 500 TABLET, FILM COATED ORAL at 22:19

## 2024-06-26 RX ADMIN — Medication 325 MILLIGRAM(S): at 11:18

## 2024-06-26 RX ADMIN — Medication 500 MILLIGRAM(S): at 11:18

## 2024-06-26 RX ADMIN — VALACYCLOVIR HYDROCHLORIDE 500 MILLIGRAM(S): 500 TABLET, FILM COATED ORAL at 11:17

## 2024-06-26 RX ADMIN — HEPARIN SODIUM 5000 UNIT(S): 50 INJECTION, SOLUTION INTRAVENOUS at 22:19

## 2024-06-26 NOTE — PROGRESS NOTE ADULT - ASSESSMENT
A/P: 27yo  @32w transferred from [facility], admitted with PPROM @30wk5d on . Patient is clinically stable with no signs of infection or PTL.    #PPROM  - s/p Mg (-)  - s/p Indocin  - s/p betamethasone (-)  - Candidate for rescue BMZ on   - s/p ampicillin (-)  - On amoxicillin (-) for 5 days; last day today  - Afebrile, no signs/symptoms of infection    #HSV  - No lesions on SSE  - Continue Valtrex 500mg BID for ppx    #Palpitations  - Outpatient TTE (): EF 65-70%, V ventricular systolic function hyperdynamic. No valvular disease.  - s/p Holter monitor  - Follow up Cardio OB recs - will review Holter monitor and provide recs    #Fetal wellbeing  - NST BID  - ATU (): vertex, posterior placenta, JUAN 5.56, MVP 2.6, BPP 8/8    #Maternal wellbeing  - Contraception: undecided, but declines BS or IUD  - HSQ for DVT ppx  - PNV  - Fe, Vit C  - Regular diet    #Disposition  - Candidate for exp mgmt until delivery at 34-36w unless change in fetal or maternal status    Reina Scruggs, PGY3

## 2024-06-26 NOTE — PROGRESS NOTE ADULT - ATTENDING COMMENTS
Patient seen and examined  Has no complains  No pain , bleeding or tenderness  FHR cat 1  will continue conservative management

## 2024-06-26 NOTE — PROGRESS NOTE ADULT - SUBJECTIVE AND OBJECTIVE BOX
R3 Antepartum Note, HD#9    Patient seen and examined at bedside, no acute overnight events. No acute complaints. Continues to have minimal clear leakage. Pt reports +FM, denies VB, ctx, HA, epigastric pain, blurred vision, CP, SOB, N/V, fevers, and chills.    Vital Signs Last 24 Hours  T(C): 36.9 (06-26-24 @ 05:39), Max: 37 (06-25-24 @ 13:37)  HR: 89 (06-26-24 @ 05:40) (85 - 108)  BP: 113/61 (06-26-24 @ 05:40) (108/65 - 122/64)  RR: 18 (06-26-24 @ 05:39) (15 - 18)  SpO2: 100% (06-26-24 @ 05:39) (97% - 100%)    CAPILLARY BLOOD GLUCOSE          Physical Exam:  General: NAD  Abdomen: Soft, non-tender, gravid  Ext: No pain or swelling    NST reactive overnight    Labs:             11.3   12.41 )-----------( 214      ( 06-24 @ 07:33 )             34.0                   MEDICATIONS  (STANDING):  ascorbic acid 500 milliGRAM(s) Oral daily  chlorhexidine 2% Cloths 1 Application(s) Topical <User Schedule>  ferrous    sulfate 325 milliGRAM(s) Oral daily  heparin   Injectable 5000 Unit(s) SubCutaneous every 12 hours  prenatal multivitamin 1 Tablet(s) Oral daily  valACYclovir 500 milliGRAM(s) Oral two times a day    MEDICATIONS  (PRN):

## 2024-06-27 LAB
BASOPHILS # BLD AUTO: 0.09 K/UL — SIGNIFICANT CHANGE UP (ref 0–0.2)
BASOPHILS NFR BLD AUTO: 0.8 % — SIGNIFICANT CHANGE UP (ref 0–2)
EOSINOPHIL # BLD AUTO: 0.12 K/UL — SIGNIFICANT CHANGE UP (ref 0–0.5)
EOSINOPHIL NFR BLD AUTO: 1 % — SIGNIFICANT CHANGE UP (ref 0–6)
HCT VFR BLD CALC: 33.6 % — LOW (ref 34.5–45)
HGB BLD-MCNC: 11.2 G/DL — LOW (ref 11.5–15.5)
IANC: 7.38 K/UL — SIGNIFICANT CHANGE UP (ref 1.8–7.4)
IMM GRANULOCYTES NFR BLD AUTO: 7.5 % — HIGH (ref 0–0.9)
LYMPHOCYTES # BLD AUTO: 2.46 K/UL — SIGNIFICANT CHANGE UP (ref 1–3.3)
LYMPHOCYTES # BLD AUTO: 20.7 % — SIGNIFICANT CHANGE UP (ref 13–44)
MCHC RBC-ENTMCNC: 29 PG — SIGNIFICANT CHANGE UP (ref 27–34)
MCHC RBC-ENTMCNC: 33.3 GM/DL — SIGNIFICANT CHANGE UP (ref 32–36)
MCV RBC AUTO: 87 FL — SIGNIFICANT CHANGE UP (ref 80–100)
MONOCYTES # BLD AUTO: 0.97 K/UL — HIGH (ref 0–0.9)
MONOCYTES NFR BLD AUTO: 8.1 % — SIGNIFICANT CHANGE UP (ref 2–14)
NEUTROPHILS # BLD AUTO: 7.38 K/UL — SIGNIFICANT CHANGE UP (ref 1.8–7.4)
NEUTROPHILS NFR BLD AUTO: 61.9 % — SIGNIFICANT CHANGE UP (ref 43–77)
NRBC # BLD: 0 /100 WBCS — SIGNIFICANT CHANGE UP (ref 0–0)
NRBC # FLD: 0 K/UL — SIGNIFICANT CHANGE UP (ref 0–0)
PLATELET # BLD AUTO: 231 K/UL — SIGNIFICANT CHANGE UP (ref 150–400)
RBC # BLD: 3.86 M/UL — SIGNIFICANT CHANGE UP (ref 3.8–5.2)
RBC # FLD: 13.3 % — SIGNIFICANT CHANGE UP (ref 10.3–14.5)
WBC # BLD: 11.91 K/UL — HIGH (ref 3.8–10.5)
WBC # FLD AUTO: 11.91 K/UL — HIGH (ref 3.8–10.5)

## 2024-06-27 PROCEDURE — 86077 PHYS BLOOD BANK SERV XMATCH: CPT

## 2024-06-27 PROCEDURE — 99233 SBSQ HOSP IP/OBS HIGH 50: CPT

## 2024-06-27 RX ADMIN — Medication 1 TABLET(S): at 12:44

## 2024-06-27 RX ADMIN — VALACYCLOVIR HYDROCHLORIDE 500 MILLIGRAM(S): 500 TABLET, FILM COATED ORAL at 12:44

## 2024-06-27 RX ADMIN — Medication 1 APPLICATION(S): at 12:44

## 2024-06-27 RX ADMIN — VALACYCLOVIR HYDROCHLORIDE 500 MILLIGRAM(S): 500 TABLET, FILM COATED ORAL at 22:22

## 2024-06-27 RX ADMIN — HEPARIN SODIUM 5000 UNIT(S): 50 INJECTION, SOLUTION INTRAVENOUS at 22:23

## 2024-06-27 RX ADMIN — Medication 325 MILLIGRAM(S): at 12:44

## 2024-06-27 RX ADMIN — HEPARIN SODIUM 5000 UNIT(S): 50 INJECTION, SOLUTION INTRAVENOUS at 12:44

## 2024-06-27 RX ADMIN — Medication 500 MILLIGRAM(S): at 12:44

## 2024-06-27 NOTE — PROGRESS NOTE ADULT - ASSESSMENT
A/P: 29yo  @32w1d transferred from [facility], admitted with PPROM @30wk5d on . Patient is clinically stable with no signs of infection or PTL.    #PPROM  - s/p Mg (-)  - s/p Indocin  - s/p betamethasone (-)  - Candidate for rescue BMZ on   - s/p ampicillin (-)  - s/p amoxicillin (-)   - Afebrile, no signs/symptoms of infection    #HSV  - No lesions on SSE  - Continue Valtrex 500mg BID for ppx    #Palpitations  - Outpatient TTE (): EF 65-70%, V ventricular systolic function hyperdynamic. No valvular disease.  - s/p Holter monitor  - Follow up Cardio OB recs - will review Holter monitor and provide recs    #Fetal wellbeing  - NST BID  - ATU (): vertex, posterior placenta, JUAN 5.56, MVP 2.6, BPP 8/8    #Maternal wellbeing  - Contraception: undecided, but declines BS or IUD  - HSQ for DVT ppx  - PNV  - Fe, Vit C  - Regular diet    #Disposition  - Candidate for exp mgmt until delivery at 34-36w unless change in fetal or maternal status    KRUPA Martínez PGY3

## 2024-06-27 NOTE — CONSULT NOTE ADULT - ASSESSMENT
--Zio patch results show sinus tachycardia (rates up to 140s), correlated with Pounding in chest, since being hospitalized, she has not felt symptoms  --Echo with no significant abnormalities  --Patient is admitted for conservative management due to PPROM, no issues from cardiac perspective are expected for delivery/ anesthesia

## 2024-06-27 NOTE — CONSULT NOTE ADULT - SUBJECTIVE AND OBJECTIVE BOX
29yo  @32w transferred from [facility], admitted with PPROM @30wk5d on . Patient is clinically stable with no signs of infection or PTL. Ms. Corona is known to me, she presented to me a few weeks prior in the office with episodes of lightheadedness and palpitations. A Zio patch was placed on her during that office visit. The patch was sent in for interpretation by Patient's  last week. She states today that she overall does not feel palpitations as much but sometimes does have them after eating.     She denies chest pain, shortness of breath, lightheadedness.    O:  T(C): 36.7 (24 @ 09:20), Max: 37.2 (24 @ 17:22)  HR: 93 (24 @ 09:20) (85 - 93)  BP: 125/61 (24 @ 09:20) (118/64 - 131/68)  RR: 16 (24 @ 09:20) (16 - 18)  SpO2: 99% (24 @ 09:20) (80% - 100%)      O/E:  Gen: NAD  HEENT: EOMI  CV: RRR, normal S1 + S2, no m/r/g  Lungs: CTAB  Abd: gravid  Ext: No edema    Labs:                        11.2   11.91 )-----------( 231      ( 2024 06:15 )             33.6     Meds:  MEDICATIONS  (STANDING):  ascorbic acid 500 milliGRAM(s) Oral daily  chlorhexidine 2% Cloths 1 Application(s) Topical <User Schedule>  ferrous    sulfate 325 milliGRAM(s) Oral daily  heparin   Injectable 5000 Unit(s) SubCutaneous every 12 hours  prenatal multivitamin 1 Tablet(s) Oral daily  valACYclovir 500 milliGRAM(s) Oral two times a day

## 2024-06-27 NOTE — PROGRESS NOTE ADULT - ATTENDING COMMENTS
MFM Attending Note  Patient seen at bedside  Patient continues to report clear leakage of fluid without other signs of labor with normal fetal movement.    ICU Vital Signs Last 24 Hrs  T(C): 36.7 (2024 09:20), Max: 37.2 (2024 17:22)  T(F): 98.1 (2024 09:20), Max: 99 (2024 17:22)  HR: 93 (2024 09:20) (85 - 93)  BP: 125/61 (2024 09:20) (118/64 - 131/68)  BP(mean): --  ABP: --  ABP(mean): --  RR: 16 (2024 09:20) (16 - 18)  SpO2: 99% (2024 09:20) (80% - 100%)    O2 Parameters below as of 2024 09:20  Patient On (Oxygen Delivery Method): room air    abdomen: soft, nontender gravid    EFM: 150, moderate variability, variable, + Accel  toco: quiet                          11.2   11.91 )-----------( 231      ( 2024 06:15 )             33.6     a/p:  28 y.o.  at 32 weeks  # PPROM  # Tachycardia  # Primigravid  # GBS positive  # History of short cervix, s/p vaginal progesterone  # Rh negative, s/p rhogram 24    Patient admitted for PPROM, s/p betamethasone - and magnesium sulfate, s/p latency antibiotics, s/p NICU consultation  No signs of chorio or  labor, continued in hospital care with NST BID and additional care as needed  Tachycardia: s/p evaluation by cardio OB with no further recommendations at this time    Candidate for expectant management until 34 weeks unless change in maternal or fetal status

## 2024-06-27 NOTE — PROGRESS NOTE ADULT - SUBJECTIVE AND OBJECTIVE BOX
R3 Antepartum Note, HD#10    Patient seen and examined at bedside, no acute overnight events. No acute complaints. Denies palpitations, chest pain, or shortness of breath.  Pt reports +FM, endorses continued clear leakage.  Denies VB, ctx, HA, epigastric pain, blurred vision, CP, SOB, N/V, fevers, and chills.    Vital Signs Last 24 Hours  T(C): 36.8 (06-27-24 @ 05:38), Max: 37.2 (06-26-24 @ 17:22)  HR: 86 (06-27-24 @ 05:38) (84 - 93)  BP: 118/64 (06-27-24 @ 05:38) (118/64 - 131/68)  RR: 17 (06-27-24 @ 05:38) (16 - 18)  SpO2: 97% (06-27-24 @ 05:38) (80% - 100%)    CAPILLARY BLOOD GLUCOSE          Physical Exam:  General: NAD  Abdomen: Soft, non-tender, gravid  : minimal clear fluid on pad  Ext: No pain or swelling    Labs:             11.2   11.91 )-----------( 231      ( 06-27 @ 06:15 )             33.6                   MEDICATIONS  (STANDING):  ascorbic acid 500 milliGRAM(s) Oral daily  chlorhexidine 2% Cloths 1 Application(s) Topical <User Schedule>  ferrous    sulfate 325 milliGRAM(s) Oral daily  heparin   Injectable 5000 Unit(s) SubCutaneous every 12 hours  prenatal multivitamin 1 Tablet(s) Oral daily  valACYclovir 500 milliGRAM(s) Oral two times a day    MEDICATIONS  (PRN):

## 2024-06-28 ENCOUNTER — APPOINTMENT (OUTPATIENT)
Dept: ANTEPARTUM | Facility: CLINIC | Age: 29
End: 2024-06-28
Payer: MEDICAID

## 2024-06-28 ENCOUNTER — ASOB RESULT (OUTPATIENT)
Age: 29
End: 2024-06-28

## 2024-06-28 PROCEDURE — 99232 SBSQ HOSP IP/OBS MODERATE 35: CPT

## 2024-06-28 PROCEDURE — 76819 FETAL BIOPHYS PROFIL W/O NST: CPT | Mod: 26

## 2024-06-28 RX ADMIN — Medication 1 TABLET(S): at 10:11

## 2024-06-28 RX ADMIN — VALACYCLOVIR HYDROCHLORIDE 500 MILLIGRAM(S): 500 TABLET, FILM COATED ORAL at 21:06

## 2024-06-28 RX ADMIN — VALACYCLOVIR HYDROCHLORIDE 500 MILLIGRAM(S): 500 TABLET, FILM COATED ORAL at 10:11

## 2024-06-28 RX ADMIN — HEPARIN SODIUM 5000 UNIT(S): 50 INJECTION, SOLUTION INTRAVENOUS at 21:07

## 2024-06-28 RX ADMIN — Medication 325 MILLIGRAM(S): at 10:11

## 2024-06-28 RX ADMIN — Medication 500 MILLIGRAM(S): at 10:12

## 2024-06-28 RX ADMIN — Medication 1 APPLICATION(S): at 06:10

## 2024-06-28 RX ADMIN — Medication 1 APPLICATION(S): at 21:00

## 2024-06-28 RX ADMIN — HEPARIN SODIUM 5000 UNIT(S): 50 INJECTION, SOLUTION INTRAVENOUS at 10:12

## 2024-06-28 NOTE — PROGRESS NOTE ADULT - ASSESSMENT
A/P: 27yo  @32w2d transferred from [facility], admitted with PPROM @30wk5d on . Patient is clinically stable with no signs of infection or PTL.    #PPROM  - s/p Mg (-)  - s/p Indocin  - s/p betamethasone (-)  - Candidate for rescue BMZ on   - s/p ampicillin (-)  - s/p amoxicillin (-)   - Afebrile, no signs/symptoms of infection    #HSV  - No lesions on SSE  - Continue Valtrex 500mg BID for ppx    #Palpitations  - Outpatient TTE (): EF 65-70%, V ventricular systolic function hyperdynamic. No valvular disease.  - s/p Holter monitor  - Cardio OB recs: no issues from cardiac perspective extected for delivery/anesthesia    #Fetal wellbeing  - NST BID  - ATU (): vertex, posterior placenta, JUAN 5.56, MVP 2.6, BPP 8/8    #Maternal wellbeing  - Contraception: undecided, but declines BS or IUD  - HSQ for DVT ppx  - PNV  - Fe, Vit C  - Regular diet    #Disposition  - Candidate for exp mgmt until delivery at 34-36w unless change in fetal or maternal status    KRUPA Martínez PGY3

## 2024-06-28 NOTE — PROGRESS NOTE ADULT - SUBJECTIVE AND OBJECTIVE BOX
R3 Antepartum Note, HD#11      Patient seen and examined at bedside, no acute overnight events. No acute complaints. Pt reports +FM, continues to endorse leakage of clear fluid, VB, ctx, HA, epigastric pain, blurred vision, CP, SOB, N/V, fevers, and chills.    Vital Signs Last 24 Hours  T(C): 36.9 (06-28-24 @ 05:27), Max: 36.9 (06-27-24 @ 14:07)  HR: 92 (06-28-24 @ 05:27) (88 - 97)  BP: 107/58 (06-28-24 @ 05:27) (107/58 - 128/66)  RR: 18 (06-28-24 @ 05:27) (16 - 18)  SpO2: 97% (06-28-24 @ 05:27) (97% - 99%)    CAPILLARY BLOOD GLUCOSE          Physical Exam:  General: NAD  Abdomen: Soft, non-tender, gravid  : pad with minimal clear fluid  Ext: No pain or swelling    Labs:             11.2   11.91 )-----------( 231      ( 06-27 @ 06:15 )             33.6                   MEDICATIONS  (STANDING):  ascorbic acid 500 milliGRAM(s) Oral daily  chlorhexidine 2% Cloths 1 Application(s) Topical <User Schedule>  ferrous    sulfate 325 milliGRAM(s) Oral daily  heparin   Injectable 5000 Unit(s) SubCutaneous every 12 hours  prenatal multivitamin 1 Tablet(s) Oral daily  valACYclovir 500 milliGRAM(s) Oral two times a day    MEDICATIONS  (PRN):   R3 Antepartum Note, HD#11      Patient seen and examined at bedside, no acute overnight events. No acute complaints. Pt reports +FM, continues to endorse leakage of clear fluid, VB, ctx, HA, epigastric pain, blurred vision, CP, SOB, N/V, fevers, and chills.    Vital Signs Last 24 Hours  T(C): 36.9 (06-28-24 @ 05:27), Max: 36.9 (06-27-24 @ 14:07)  HR: 92 (06-28-24 @ 05:27) (88 - 97)  BP: 107/58 (06-28-24 @ 05:27) (107/58 - 128/66)  RR: 18 (06-28-24 @ 05:27) (16 - 18)  SpO2: 97% (06-28-24 @ 05:27) (97% - 99%)    Physical Exam:  General: NAD  Abdomen: Soft, non-tender, gravid  : pad with minimal clear fluid  Ext: No pain or swelling    Labs:             11.2   11.91 )-----------( 231      ( 06-27 @ 06:15 )             33.6                   MEDICATIONS  (STANDING):  ascorbic acid 500 milliGRAM(s) Oral daily  chlorhexidine 2% Cloths 1 Application(s) Topical <User Schedule>  ferrous    sulfate 325 milliGRAM(s) Oral daily  heparin   Injectable 5000 Unit(s) SubCutaneous every 12 hours  prenatal multivitamin 1 Tablet(s) Oral daily  valACYclovir 500 milliGRAM(s) Oral two times a day    MEDICATIONS  (PRN):

## 2024-06-28 NOTE — PROGRESS NOTE ADULT - ATTENDING COMMENTS
MFM ATTENDING:   Agree with above notes. Patient admitted for PPROM at 30w5d, now 32w2d, stable. S/p ACS x 1 and rescue eligible. Plan for 34 week delivery unless otherwise indicated with inpatient admission until that time. Up to date with prenatal care including RhoGAM for rh negative status.     DAVID Abdul MD

## 2024-06-28 NOTE — PROGRESS NOTE ADULT - NSPROGADDITIONALINFOA_GEN_ALL_CORE
MFM Fellow Addendum     27 yo  at 32w2d admitted for PPROM, status post one week of antibiotics, with no signs/symptoms of intra-amniotic infection, placental abruption or progressing  labor-patient has been clinically stable this hospitalization. Fetus is vertex, normally-grown (EFW 1766g), and patient is s/p betamethasone -. Undergoing twice daily NST and twice weekly BPP. GBS positive with plan for prophylaxis at time of delivery. Concurrent antepartum issues including Rh negative, HSV with no current outbreak on Valtrex for prophylaxis, and palpitations status post Holter monitor and echocardiogram with no acute maternal cardiovascular issues. Patient undecided on contraceptive methods but declines tubal or IUD.     NST: ***     Continue inpatient management for PPROM, with planned delivery at 34-36 weeks, with earlier delivery depending on maternal and fetal status (such as development of intra-amniotic infection, progression of  labor, indication of fetal distress). Patient now eligible for rescue betamethasone if indicated. 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     Barbara Mason MD   Patient seen and evaluated with Dr. Chantell MD MFM Fellow Addendum     29 yo  at 32w2d admitted for PPROM, status post one week of antibiotics, with no signs/symptoms of intra-amniotic infection, placental abruption or progressing  labor-patient has been clinically stable this hospitalization. Fetus is vertex, normally-grown (EFW 1766g), and patient is s/p betamethasone -. Undergoing twice daily NST and twice weekly BPP. GBS positive with plan for prophylaxis at time of delivery. Concurrent antepartum issues including Rh negative, HSV with no current outbreak on Valtrex for prophylaxis, and palpitations status post Holter monitor and echocardiogram with no acute maternal cardiovascular issues. Patient undecided on contraceptive methods but declines tubal or IUD.     NST: 145 bpm, moderate variability, +accelerations, no decelerations  No contractions on toco   Reactive NST     Continue inpatient management for PPROM, with planned delivery at 34-36 weeks, with earlier delivery depending on maternal and fetal status (such as development of intra-amniotic infection, progression of  labor, indication of fetal distress). Patient now eligible for rescue betamethasone if indicated. 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     Barbara Mason MD   Patient seen and evaluated with Dr. Chantell MD

## 2024-06-29 PROCEDURE — 99232 SBSQ HOSP IP/OBS MODERATE 35: CPT | Mod: GC

## 2024-06-29 RX ADMIN — Medication 500 MILLIGRAM(S): at 10:01

## 2024-06-29 RX ADMIN — HEPARIN SODIUM 5000 UNIT(S): 50 INJECTION, SOLUTION INTRAVENOUS at 22:23

## 2024-06-29 RX ADMIN — HEPARIN SODIUM 5000 UNIT(S): 50 INJECTION, SOLUTION INTRAVENOUS at 10:00

## 2024-06-29 RX ADMIN — Medication 325 MILLIGRAM(S): at 10:00

## 2024-06-29 RX ADMIN — VALACYCLOVIR HYDROCHLORIDE 500 MILLIGRAM(S): 500 TABLET, FILM COATED ORAL at 22:23

## 2024-06-29 RX ADMIN — Medication 1 TABLET(S): at 10:00

## 2024-06-29 RX ADMIN — VALACYCLOVIR HYDROCHLORIDE 500 MILLIGRAM(S): 500 TABLET, FILM COATED ORAL at 10:01

## 2024-06-29 NOTE — PROGRESS NOTE ADULT - SUBJECTIVE AND OBJECTIVE BOX
PGY3 Antepartum Note    Patient seen and examined at bedside, no acute overnight events. No acute complaints. Pt reports +FM, continues to endorse leakage of clear fluid, VB, ctx, HA, epigastric pain, blurred vision, CP, SOB, N/V, fevers, and chills.    Gen: NAD  Cardio: rrr, s1/s2  Pulm: ca b/l  Abd: gravid, nontender, no palpable contractions  Ext: no edema,  no calf enderness  SVE: deferred    Medications:  (ADM OVERRIDE): 1 Each (06-18-24 @ 03:50)  (ADM OVERRIDE): 1 Each (06-18-24 @ 03:50)  amoxicillin: 500 milliGRAM(s) (06-24-24 @ 21:01),  500 milliGRAM(s) (06-24-24 @ 13:44),  500 milliGRAM(s) (06-24-24 @ 06:03),  500 milliGRAM(s) (06-23-24 @ 21:31),  500 milliGRAM(s) (06-23-24 @ 13:43),  500 milliGRAM(s) (06-23-24 @ 06:16),  500 milliGRAM(s) (06-22-24 @ 22:13),  500 milliGRAM(s) (06-22-24 @ 13:36),  500 milliGRAM(s) (06-22-24 @ 06:43),  500 milliGRAM(s) (06-21-24 @ 21:45),  500 milliGRAM(s) (06-21-24 @ 14:53),  500 milliGRAM(s) (06-21-24 @ 05:12),  500 milliGRAM(s) (06-20-24 @ 21:03),  500 milliGRAM(s) (06-20-24 @ 15:08),  500 milliGRAM(s) (06-20-24 @ 06:05)  ampicillin  IVPB: 200 mL/Hr (06-18-24 @ 04:05)  ampicillin  IVPB: 200 mL/Hr (06-19-24 @ 22:04),  200 mL/Hr (06-19-24 @ 17:17),  200 mL/Hr (06-19-24 @ 10:31),  200 mL/Hr (06-19-24 @ 03:49),  200 mL/Hr (06-18-24 @ 22:13),  200 mL/Hr (06-18-24 @ 17:33),  200 mL/Hr (06-18-24 @ 09:55)  ascorbic acid: 500 milliGRAM(s) (06-28-24 @ 10:12),  500 milliGRAM(s) (06-27-24 @ 12:44),  500 milliGRAM(s) (06-26-24 @ 11:18),  500 milliGRAM(s) (06-25-24 @ 11:09),  500 milliGRAM(s) (06-24-24 @ 10:02),  500 milliGRAM(s) (06-23-24 @ 10:11),  500 milliGRAM(s) (06-22-24 @ 10:02),  500 milliGRAM(s) (06-21-24 @ 11:20),  500 milliGRAM(s) (06-20-24 @ 10:17),  500 milliGRAM(s) (06-19-24 @ 10:59)  betamethasone Injectable: 12 milliGRAM(s) (06-18-24 @ 17:30)  chlorhexidine 2% Cloths: 1 Application(s) (06-28-24 @ 21:00),  1 Application(s) (06-28-24 @ 06:10),  1 Application(s) (06-27-24 @ 12:44),  1 Application(s) (06-26-24 @ 06:27),  1 Application(s) (06-25-24 @ 11:08),  1 Application(s) (06-24-24 @ 06:03),  1 Application(s) (06-23-24 @ 06:16),  1 Application(s) (06-22-24 @ 06:43),  1 Application(s) (06-21-24 @ 11:18),  1 Application(s) (06-20-24 @ 10:16),  1 Application(s) (06-19-24 @ 10:59),  1 Application(s) (06-18-24 @ 11:49)  diphtheria/tetanus/pertussis (acellular) Vaccine (Adacel): 0.5 milliLiter(s) (06-18-24 @ 17:27)  ferrous    sulfate: 325 milliGRAM(s) (06-28-24 @ 10:11),  325 milliGRAM(s) (06-27-24 @ 12:44),  325 milliGRAM(s) (06-26-24 @ 11:18),  325 milliGRAM(s) (06-25-24 @ 11:09),  325 milliGRAM(s) (06-24-24 @ 10:02),  325 milliGRAM(s) (06-23-24 @ 10:11),  325 milliGRAM(s) (06-22-24 @ 10:02),  325 milliGRAM(s) (06-21-24 @ 11:21),  325 milliGRAM(s) (06-20-24 @ 10:17),  325 milliGRAM(s) (06-19-24 @ 10:59)  heparin   Injectable: 5000 Unit(s) (06-28-24 @ 21:07),  5000 Unit(s) (06-28-24 @ 10:12),  5000 Unit(s) (06-27-24 @ 22:23),  5000 Unit(s) (06-27-24 @ 12:44),  5000 Unit(s) (06-26-24 @ 22:19),  5000 Unit(s) (06-26-24 @ 11:18),  5000 Unit(s) (06-25-24 @ 22:00),  5000 Unit(s) (06-25-24 @ 11:10),  5000 Unit(s) (06-24-24 @ 21:03),  5000 Unit(s) (06-24-24 @ 10:03),  5000 Unit(s) (06-23-24 @ 21:31),  5000 Unit(s) (06-23-24 @ 10:11),  5000 Unit(s) (06-22-24 @ 22:13),  5000 Unit(s) (06-22-24 @ 10:01),  5000 Unit(s) (06-21-24 @ 21:45),  5000 Unit(s) (06-21-24 @ 11:20),  5000 Unit(s) (06-20-24 @ 21:04),  5000 Unit(s) (06-20-24 @ 10:17),  5000 Unit(s) (06-19-24 @ 22:01),  5000 Unit(s) (06-19-24 @ 10:31),  5000 Unit(s) (06-18-24 @ 22:15),  5000 Unit(s) (06-18-24 @ 11:47)  indomethacin: 25 milliGRAM(s) (06-18-24 @ 09:58),  25 milliGRAM(s) (06-18-24 @ 04:08)  lactated ringers.: 50 mL/Hr (06-18-24 @ 09:10)  magnesium sulfate Infusion: 50 mL/Hr (06-18-24 @ 05:00)  prenatal multivitamin: 1 Tablet(s) (06-28-24 @ 10:11),  1 Tablet(s) (06-27-24 @ 12:44),  1 Tablet(s) (06-26-24 @ 11:18),  1 Tablet(s) (06-25-24 @ 11:09),  1 Tablet(s) (06-24-24 @ 10:03),  1 Tablet(s) (06-23-24 @ 10:11),  1 Tablet(s) (06-22-24 @ 10:02),  1 Tablet(s) (06-21-24 @ 11:20),  1 Tablet(s) (06-20-24 @ 10:16),  1 Tablet(s) (06-19-24 @ 10:59)  valACYclovir: 500 milliGRAM(s) (06-28-24 @ 21:06),  500 milliGRAM(s) (06-28-24 @ 10:11),  500 milliGRAM(s) (06-27-24 @ 22:22),  500 milliGRAM(s) (06-27-24 @ 12:44),  500 milliGRAM(s) (06-26-24 @ 22:19),  500 milliGRAM(s) (06-26-24 @ 11:17),  500 milliGRAM(s) (06-25-24 @ 21:48),  500 milliGRAM(s) (06-25-24 @ 11:09),  500 milliGRAM(s) (06-24-24 @ 21:01),  500 milliGRAM(s) (06-24-24 @ 10:02),  500 milliGRAM(s) (06-23-24 @ 21:32),  500 milliGRAM(s) (06-23-24 @ 10:11),  500 milliGRAM(s) (06-22-24 @ 22:13),  500 milliGRAM(s) (06-22-24 @ 10:01),  500 milliGRAM(s) (06-21-24 @ 21:46),  500 milliGRAM(s) (06-21-24 @ 11:21),  500 milliGRAM(s) (06-20-24 @ 21:02),  500 milliGRAM(s) (06-20-24 @ 10:17),  500 milliGRAM(s) (06-19-24 @ 22:01),  500 milliGRAM(s) (06-19-24 @ 10:32),  500 milliGRAM(s) (06-18-24 @ 21:25),  500 milliGRAM(s) (06-18-24 @ 08:06)      Labs:                        11.2   11.91 )-----------( 231      ( 27 Jun 2024 06:15 )             33.6

## 2024-06-29 NOTE — PROGRESS NOTE ADULT - TIME BILLING
I have personally seen and examined the patient.  I fully participated in the care of this patient.  I have made amendments to the documentation where necessary, and agree with the history, physical exam, and plan as documented by the Resident and Fellow.

## 2024-06-29 NOTE — PROGRESS NOTE ADULT - ASSESSMENT
A/P: 29yo  @32w3d transferred from [facility], admitted with PPROM @30wk5d on . Patient is clinically stable with no signs of infection or PTL.    #PPROM  - s/p Mg (-)  - s/p Indocin  - s/p betamethasone (-)  - Candidate for rescue BMZ on   - s/p ampicillin (-)  - s/p amoxicillin (-)   - Afebrile, no signs/symptoms of infection    #HSV  - No lesions on SSE  - Continue Valtrex 500mg BID for ppx    #Palpitations  - Outpatient TTE (): EF 65-70%, V ventricular systolic function hyperdynamic. No valvular disease.  - s/p Holter monitor  - Cardio OB recs: no issues from cardiac perspective extected for delivery/anesthesia    #Fetal wellbeing  - NST BID  - ATU (): vertex, posterior placenta, JUAN 5.56, MVP 2.6, BPP 8/8    #Maternal wellbeing  - Contraception: undecided, but declines BS or IUD  - HSQ for DVT ppx  - PNV  - Fe, Vit C  - Regular diet    #Disposition  - Candidate for exp mgmt until delivery at 34-36w unless change in fetal or maternal status    Brenton Ohara, PGY-2

## 2024-06-29 NOTE — PROGRESS NOTE ADULT - ATTENDING COMMENTS
Pt 27yo  @32w3d transferred from [facility] for PPROM.  Doing well, no complaints.    No s/s of chorio or PTL.  Continue current inpatient management and plan for delivery at 34 weeks or earlier if a clinical indication presents.

## 2024-06-30 LAB
BASOPHILS # BLD AUTO: 0.11 K/UL — SIGNIFICANT CHANGE UP (ref 0–0.2)
BASOPHILS NFR BLD AUTO: 1 % — SIGNIFICANT CHANGE UP (ref 0–2)
BLD GP AB SCN SERPL QL: POSITIVE — SIGNIFICANT CHANGE UP
EOSINOPHIL # BLD AUTO: 0.11 K/UL — SIGNIFICANT CHANGE UP (ref 0–0.5)
EOSINOPHIL NFR BLD AUTO: 1 % — SIGNIFICANT CHANGE UP (ref 0–6)
HCT VFR BLD CALC: 31.7 % — LOW (ref 34.5–45)
HGB BLD-MCNC: 10.7 G/DL — LOW (ref 11.5–15.5)
IANC: 7.47 K/UL — HIGH (ref 1.8–7.4)
IMM GRANULOCYTES NFR BLD AUTO: 6.7 % — HIGH (ref 0–0.9)
LYMPHOCYTES # BLD AUTO: 1.97 K/UL — SIGNIFICANT CHANGE UP (ref 1–3.3)
LYMPHOCYTES # BLD AUTO: 17.3 % — SIGNIFICANT CHANGE UP (ref 13–44)
MCHC RBC-ENTMCNC: 29 PG — SIGNIFICANT CHANGE UP (ref 27–34)
MCHC RBC-ENTMCNC: 33.8 GM/DL — SIGNIFICANT CHANGE UP (ref 32–36)
MCV RBC AUTO: 85.9 FL — SIGNIFICANT CHANGE UP (ref 80–100)
MONOCYTES # BLD AUTO: 0.98 K/UL — HIGH (ref 0–0.9)
MONOCYTES NFR BLD AUTO: 8.6 % — SIGNIFICANT CHANGE UP (ref 2–14)
NEUTROPHILS # BLD AUTO: 7.47 K/UL — HIGH (ref 1.8–7.4)
NEUTROPHILS NFR BLD AUTO: 65.4 % — SIGNIFICANT CHANGE UP (ref 43–77)
NRBC # BLD: 0 /100 WBCS — SIGNIFICANT CHANGE UP (ref 0–0)
NRBC # FLD: 0 K/UL — SIGNIFICANT CHANGE UP (ref 0–0)
PLATELET # BLD AUTO: 225 K/UL — SIGNIFICANT CHANGE UP (ref 150–400)
RBC # BLD: 3.69 M/UL — LOW (ref 3.8–5.2)
RBC # FLD: 13.5 % — SIGNIFICANT CHANGE UP (ref 10.3–14.5)
RH IG SCN BLD-IMP: NEGATIVE — SIGNIFICANT CHANGE UP
WBC # BLD: 11.41 K/UL — HIGH (ref 3.8–10.5)
WBC # FLD AUTO: 11.41 K/UL — HIGH (ref 3.8–10.5)

## 2024-06-30 PROCEDURE — 99232 SBSQ HOSP IP/OBS MODERATE 35: CPT | Mod: GC

## 2024-06-30 RX ADMIN — VALACYCLOVIR HYDROCHLORIDE 500 MILLIGRAM(S): 500 TABLET, FILM COATED ORAL at 10:35

## 2024-06-30 RX ADMIN — HEPARIN SODIUM 5000 UNIT(S): 50 INJECTION, SOLUTION INTRAVENOUS at 21:21

## 2024-06-30 RX ADMIN — Medication 500 MILLIGRAM(S): at 10:35

## 2024-06-30 RX ADMIN — HEPARIN SODIUM 5000 UNIT(S): 50 INJECTION, SOLUTION INTRAVENOUS at 10:51

## 2024-06-30 RX ADMIN — Medication 1 TABLET(S): at 10:35

## 2024-06-30 RX ADMIN — Medication 1 APPLICATION(S): at 06:03

## 2024-06-30 RX ADMIN — VALACYCLOVIR HYDROCHLORIDE 500 MILLIGRAM(S): 500 TABLET, FILM COATED ORAL at 21:20

## 2024-06-30 RX ADMIN — Medication 325 MILLIGRAM(S): at 10:35

## 2024-06-30 NOTE — PROGRESS NOTE ADULT - ASSESSMENT
A/P: 27yo  @32w4d transferred from [facility], admitted with PPROM @30wk5d on . Patient is clinically stable with no signs of infection or PTL.    #PPROM  - s/p Mg (-)  - s/p Indocin  - s/p betamethasone (-)  - Candidate for rescue BMZ on   - s/p ampicillin (-)  - s/p amoxicillin (-)   - Afebrile, no signs/symptoms of infection    #HSV  - No lesions on SSE  - Continue Valtrex 500mg BID for ppx    #Palpitations  - Outpatient TTE (): EF 65-70%, V ventricular systolic function hyperdynamic. No valvular disease.  - s/p Holter monitor  - Cardio OB recs: no issues from cardiac perspective expected for delivery/anesthesia    #Fetal wellbeing  - NST BID  - ATU (): vertex, posterior placenta, JUAN 5.56, MVP 2.6, BPP 8/8  - ASTU (): vtx, post, JUAN 7.06, BPP 8/8    #Maternal wellbeing  - Contraception: undecided, but declines BS or IUD  - HSQ for DVT ppx  - PNV  - Fe, Vit C  - Regular diet    #Disposition  - Candidate for exp mgmt until delivery at 34-36w unless change in fetal or maternal status    Reina Scruggs PGY3

## 2024-06-30 NOTE — PROGRESS NOTE ADULT - SUBJECTIVE AND OBJECTIVE BOX
R3 Antepartum Note, HD#13    Patient seen and examined at bedside, no acute overnight events. No acute complaints. Pt reports +FM, denies LOF, VB, ctx, HA, epigastric pain, blurred vision, CP, SOB, N/V, fevers, and chills.    Vital Signs Last 24 Hours  T(C): 36.7 (06-29-24 @ 21:38), Max: 36.9 (06-29-24 @ 05:55)  HR: 79 (06-29-24 @ 21:38) (79 - 97)  BP: 125/61 (06-29-24 @ 21:38) (112/64 - 129/75)  RR: 17 (06-29-24 @ 21:38) (15 - 18)  SpO2: 99% (06-29-24 @ 21:38) (99% - 100%)    CAPILLARY BLOOD GLUCOSE          Physical Exam:  General: NAD  Abdomen: Soft, non-tender, gravid  Ext: No pain or swelling    NST reactive overnight        Labs:          MEDICATIONS  (STANDING):  ascorbic acid 500 milliGRAM(s) Oral daily  chlorhexidine 2% Cloths 1 Application(s) Topical <User Schedule>  ferrous    sulfate 325 milliGRAM(s) Oral daily  heparin   Injectable 5000 Unit(s) SubCutaneous every 12 hours  prenatal multivitamin 1 Tablet(s) Oral daily  valACYclovir 500 milliGRAM(s) Oral two times a day    MEDICATIONS  (PRN):   R3 Antepartum Note, HD#13    Patient seen and examined at bedside, no acute overnight events. No acute complaints. Pt reports minimal clear leakage of fluid and +FM. She denies VB, ctx, HA, epigastric pain, blurred vision, CP, SOB, N/V, fevers, and chills.    Vital Signs Last 24 Hours  T(C): 36.7 (06-29-24 @ 21:38), Max: 36.9 (06-29-24 @ 05:55)  HR: 79 (06-29-24 @ 21:38) (79 - 97)  BP: 125/61 (06-29-24 @ 21:38) (112/64 - 129/75)  RR: 17 (06-29-24 @ 21:38) (15 - 18)  SpO2: 99% (06-29-24 @ 21:38) (99% - 100%)    CAPILLARY BLOOD GLUCOSE          Physical Exam:  General: NAD  Abdomen: Soft, non-tender, gravid  Ext: No pain or swelling    NST reactive overnight        Labs:          MEDICATIONS  (STANDING):  ascorbic acid 500 milliGRAM(s) Oral daily  chlorhexidine 2% Cloths 1 Application(s) Topical <User Schedule>  ferrous    sulfate 325 milliGRAM(s) Oral daily  heparin   Injectable 5000 Unit(s) SubCutaneous every 12 hours  prenatal multivitamin 1 Tablet(s) Oral daily  valACYclovir 500 milliGRAM(s) Oral two times a day    MEDICATIONS  (PRN):

## 2024-06-30 NOTE — PROGRESS NOTE ADULT - NSPROGADDITIONALINFOA_GEN_ALL_CORE
GLENROY Fellow Addendum     27 yo  at 32w4d admitted for PPROM, s/p latency antibiotics, with no signs/symptoms of intra-amniotic infection, placental abruption or progressing  labor. Pt is clinically stable at this time. Fetus AGA (EFW 1766g). s/p ACS (-). Monitor with twice daily NST and twice weekly BPP. GBS positive. Rh negative, HSV with no current outbreak on Valtrex for prophylaxis, and palpitations status post Holter monitor and echocardiogram with no acute maternal cardiovascular issues. Patient undecided on contraceptive methods but declines tubal or IUD.     NST: 150 bpm, moderate variability, +accelerations, no decelerations  No contractions on toco   Reactive NST     Continue inpatient management for PPROM, with planned delivery at 34 week unless indicated early by change in maternal or fetal status. She is eligible for rescue BMZ if indicated.     Patient seen and evaluated with GLENROY Rodríguez Attending  GLENROY Ribera Fellow

## 2024-07-01 PROCEDURE — 99232 SBSQ HOSP IP/OBS MODERATE 35: CPT

## 2024-07-01 RX ADMIN — VALACYCLOVIR HYDROCHLORIDE 500 MILLIGRAM(S): 500 TABLET, FILM COATED ORAL at 10:31

## 2024-07-01 RX ADMIN — HEPARIN SODIUM 5000 UNIT(S): 50 INJECTION, SOLUTION INTRAVENOUS at 10:32

## 2024-07-01 RX ADMIN — HEPARIN SODIUM 5000 UNIT(S): 50 INJECTION, SOLUTION INTRAVENOUS at 21:02

## 2024-07-01 RX ADMIN — Medication 500 MILLIGRAM(S): at 10:32

## 2024-07-01 RX ADMIN — Medication 1 TABLET(S): at 10:35

## 2024-07-01 RX ADMIN — Medication 1 APPLICATION(S): at 05:29

## 2024-07-01 RX ADMIN — VALACYCLOVIR HYDROCHLORIDE 500 MILLIGRAM(S): 500 TABLET, FILM COATED ORAL at 21:02

## 2024-07-01 RX ADMIN — Medication 325 MILLIGRAM(S): at 10:31

## 2024-07-01 NOTE — PROGRESS NOTE ADULT - ATTENDING COMMENTS
Patient seen and examined  No complains  No contractions bleeding or pain  Continue daily monitoring with plan for delivery between 34-36 weeks

## 2024-07-01 NOTE — PROGRESS NOTE ADULT - ASSESSMENT
A/P: 27yo  @32w5d transferred from [facility], admitted with PPROM @30wk5d on . Patient is clinically stable with no signs of infection or PTL.    #PPROM  - s/p Mg (-)  - s/p Indocin  - s/p betamethasone (-)  - Candidate for rescue BMZ on   - s/p ampicillin (-)  - s/p amoxicillin (-)   - Afebrile, no signs/symptoms of infection    #HSV  - No lesions on SSE  - Continue Valtrex 500mg BID for ppx    #Palpitations  - Outpatient TTE (): EF 65-70%, V ventricular systolic function hyperdynamic. No valvular disease.  - s/p Holter monitor  - Cardio OB recs: no issues from cardiac perspective expected for delivery/anesthesia    #Fetal wellbeing  - NST BID  - ATU (): vertex, posterior placenta, JUAN 5.56, MVP 2.6, BPP 8/8  - ASTU (): vtx, post, JUAN 7.06, BPP 8/8    #Maternal wellbeing  - Contraception: undecided, but declines BS or IUD  - HSQ for DVT ppx  - PNV  - Fe, Vit C  - Regular diet    #Disposition  - Candidate for exp mgmt until delivery at 34-36w unless change in fetal or maternal status    Brenton Ohara, PGY-2

## 2024-07-02 ENCOUNTER — APPOINTMENT (OUTPATIENT)
Dept: ANTEPARTUM | Facility: HOSPITAL | Age: 29
End: 2024-07-02
Payer: MEDICAID

## 2024-07-02 ENCOUNTER — ASOB RESULT (OUTPATIENT)
Age: 29
End: 2024-07-02

## 2024-07-02 PROCEDURE — 76819 FETAL BIOPHYS PROFIL W/O NST: CPT | Mod: 26

## 2024-07-02 PROCEDURE — 99232 SBSQ HOSP IP/OBS MODERATE 35: CPT | Mod: GC

## 2024-07-02 RX ADMIN — HEPARIN SODIUM 5000 UNIT(S): 50 INJECTION, SOLUTION INTRAVENOUS at 12:05

## 2024-07-02 RX ADMIN — Medication 1 TABLET(S): at 12:04

## 2024-07-02 RX ADMIN — VALACYCLOVIR HYDROCHLORIDE 500 MILLIGRAM(S): 500 TABLET, FILM COATED ORAL at 21:57

## 2024-07-02 RX ADMIN — Medication 500 MILLIGRAM(S): at 12:05

## 2024-07-02 RX ADMIN — VALACYCLOVIR HYDROCHLORIDE 500 MILLIGRAM(S): 500 TABLET, FILM COATED ORAL at 12:04

## 2024-07-02 RX ADMIN — HEPARIN SODIUM 5000 UNIT(S): 50 INJECTION, SOLUTION INTRAVENOUS at 21:57

## 2024-07-02 RX ADMIN — Medication 1 APPLICATION(S): at 05:29

## 2024-07-02 RX ADMIN — Medication 325 MILLIGRAM(S): at 12:04

## 2024-07-02 NOTE — PROGRESS NOTE ADULT - ATTENDING COMMENTS
MFM ATTENDING    29yo P0 at 32w6d with PPROM.     denies vb/ctx, reports normal fetal movement. endorses ongoing small volume leakage of clear fluid.     s/p acs -  s/p mag -  s/p NICU consult   fetus AGA 1766g 58% on   s/p indocin  s/p latency abx    [] eligible for rescue ACS if change in status    Continue valtrex  Continue current management, routine ap care.   Recommend goal for delivery at 34 weeks. Delivery may be indicated sooner if there is a change in maternal and/or fetal status.  MOD: planned vaginal, with  reserved for routine obstetrical indications.     All questions answered to patients satisfaction.

## 2024-07-02 NOTE — PROGRESS NOTE ADULT - SUBJECTIVE AND OBJECTIVE BOX
PGY3 Antepartum Note    Patient seen and examined at bedside, no acute overnight events. No acute complaints. Pt reports minimal clear leakage of fluid and +FM. She denies VB, ctx, HA, epigastric pain, blurred vision, CP, SOB, N/V, fevers, and chills.    T(F): 98.3 (05:52)  HR: 88 (05:52)  BP: 116/60 (05:52)  RR: 16 (05:52)    Gen: NAD  Cardio: rrr, s1/s2  Pulm: ca b/l  Abd: gravid, nontender, no palpable contractions  Ext: no edema,  no calf tenderness  SVE: deferred    Medications:  (ADM OVERRIDE): 1 Each (06-18-24 @ 03:50)  (ADM OVERRIDE): 1 Each (06-18-24 @ 03:50)  amoxicillin: 500 milliGRAM(s) (06-24-24 @ 21:01),  500 milliGRAM(s) (06-24-24 @ 13:44),  500 milliGRAM(s) (06-24-24 @ 06:03),  500 milliGRAM(s) (06-23-24 @ 21:31),  500 milliGRAM(s) (06-23-24 @ 13:43),  500 milliGRAM(s) (06-23-24 @ 06:16),  500 milliGRAM(s) (06-22-24 @ 22:13),  500 milliGRAM(s) (06-22-24 @ 13:36),  500 milliGRAM(s) (06-22-24 @ 06:43),  500 milliGRAM(s) (06-21-24 @ 21:45),  500 milliGRAM(s) (06-21-24 @ 14:53),  500 milliGRAM(s) (06-21-24 @ 05:12),  500 milliGRAM(s) (06-20-24 @ 21:03),  500 milliGRAM(s) (06-20-24 @ 15:08),  500 milliGRAM(s) (06-20-24 @ 06:05)  ampicillin  IVPB: 200 mL/Hr (06-18-24 @ 04:05)  ampicillin  IVPB: 200 mL/Hr (06-19-24 @ 22:04),  200 mL/Hr (06-19-24 @ 17:17),  200 mL/Hr (06-19-24 @ 10:31),  200 mL/Hr (06-19-24 @ 03:49),  200 mL/Hr (06-18-24 @ 22:13),  200 mL/Hr (06-18-24 @ 17:33),  200 mL/Hr (06-18-24 @ 09:55)  ascorbic acid: 500 milliGRAM(s) (07-01-24 @ 10:32),  500 milliGRAM(s) (06-30-24 @ 10:35),  500 milliGRAM(s) (06-29-24 @ 10:01),  500 milliGRAM(s) (06-28-24 @ 10:12),  500 milliGRAM(s) (06-27-24 @ 12:44),  500 milliGRAM(s) (06-26-24 @ 11:18),  500 milliGRAM(s) (06-25-24 @ 11:09),  500 milliGRAM(s) (06-24-24 @ 10:02),  500 milliGRAM(s) (06-23-24 @ 10:11),  500 milliGRAM(s) (06-22-24 @ 10:02),  500 milliGRAM(s) (06-21-24 @ 11:20),  500 milliGRAM(s) (06-20-24 @ 10:17),  500 milliGRAM(s) (06-19-24 @ 10:59)  betamethasone Injectable: 12 milliGRAM(s) (06-18-24 @ 17:30)  chlorhexidine 2% Cloths: 1 Application(s) (07-02-24 @ 05:29),  1 Application(s) (07-01-24 @ 05:29),  1 Application(s) (06-30-24 @ 06:03),  1 Application(s) (06-28-24 @ 21:00),  1 Application(s) (06-28-24 @ 06:10),  1 Application(s) (06-27-24 @ 12:44),  1 Application(s) (06-26-24 @ 06:27),  1 Application(s) (06-25-24 @ 11:08),  1 Application(s) (06-24-24 @ 06:03),  1 Application(s) (06-23-24 @ 06:16),  1 Application(s) (06-22-24 @ 06:43),  1 Application(s) (06-21-24 @ 11:18),  1 Application(s) (06-20-24 @ 10:16),  1 Application(s) (06-19-24 @ 10:59),  1 Application(s) (06-18-24 @ 11:49)  diphtheria/tetanus/pertussis (acellular) Vaccine (Adacel): 0.5 milliLiter(s) (06-18-24 @ 17:27)  ferrous    sulfate: 325 milliGRAM(s) (07-01-24 @ 10:31),  325 milliGRAM(s) (06-30-24 @ 10:35),  325 milliGRAM(s) (06-29-24 @ 10:00),  325 milliGRAM(s) (06-28-24 @ 10:11),  325 milliGRAM(s) (06-27-24 @ 12:44),  325 milliGRAM(s) (06-26-24 @ 11:18),  325 milliGRAM(s) (06-25-24 @ 11:09),  325 milliGRAM(s) (06-24-24 @ 10:02),  325 milliGRAM(s) (06-23-24 @ 10:11),  325 milliGRAM(s) (06-22-24 @ 10:02),  325 milliGRAM(s) (06-21-24 @ 11:21),  325 milliGRAM(s) (06-20-24 @ 10:17),  325 milliGRAM(s) (06-19-24 @ 10:59)  heparin   Injectable: 5000 Unit(s) (07-01-24 @ 21:02),  5000 Unit(s) (07-01-24 @ 10:32),  5000 Unit(s) (06-30-24 @ 21:21),  5000 Unit(s) (06-30-24 @ 10:51),  5000 Unit(s) (06-29-24 @ 22:23),  5000 Unit(s) (06-29-24 @ 10:00),  5000 Unit(s) (06-28-24 @ 21:07),  5000 Unit(s) (06-28-24 @ 10:12),  5000 Unit(s) (06-27-24 @ 22:23),  5000 Unit(s) (06-27-24 @ 12:44),  5000 Unit(s) (06-26-24 @ 22:19),  5000 Unit(s) (06-26-24 @ 11:18),  5000 Unit(s) (06-25-24 @ 22:00),  5000 Unit(s) (06-25-24 @ 11:10),  5000 Unit(s) (06-24-24 @ 21:03),  5000 Unit(s) (06-24-24 @ 10:03),  5000 Unit(s) (06-23-24 @ 21:31),  5000 Unit(s) (06-23-24 @ 10:11),  5000 Unit(s) (06-22-24 @ 22:13),  5000 Unit(s) (06-22-24 @ 10:01),  5000 Unit(s) (06-21-24 @ 21:45),  5000 Unit(s) (06-21-24 @ 11:20),  5000 Unit(s) (06-20-24 @ 21:04),  5000 Unit(s) (06-20-24 @ 10:17),  5000 Unit(s) (06-19-24 @ 22:01),  5000 Unit(s) (06-19-24 @ 10:31),  5000 Unit(s) (06-18-24 @ 22:15),  5000 Unit(s) (06-18-24 @ 11:47)  indomethacin: 25 milliGRAM(s) (06-18-24 @ 09:58),  25 milliGRAM(s) (06-18-24 @ 04:08)  lactated ringers.: 50 mL/Hr (06-18-24 @ 09:10)  magnesium sulfate Infusion: 50 mL/Hr (06-18-24 @ 05:00)  prenatal multivitamin: 1 Tablet(s) (07-01-24 @ 10:35),  1 Tablet(s) (06-30-24 @ 10:35),  1 Tablet(s) (06-29-24 @ 10:00),  1 Tablet(s) (06-28-24 @ 10:11),  1 Tablet(s) (06-27-24 @ 12:44),  1 Tablet(s) (06-26-24 @ 11:18),  1 Tablet(s) (06-25-24 @ 11:09),  1 Tablet(s) (06-24-24 @ 10:03),  1 Tablet(s) (06-23-24 @ 10:11),  1 Tablet(s) (06-22-24 @ 10:02),  1 Tablet(s) (06-21-24 @ 11:20),  1 Tablet(s) (06-20-24 @ 10:16),  1 Tablet(s) (06-19-24 @ 10:59)  valACYclovir: 500 milliGRAM(s) (07-01-24 @ 21:02),  500 milliGRAM(s) (07-01-24 @ 10:31),  500 milliGRAM(s) (06-30-24 @ 21:20),  500 milliGRAM(s) (06-30-24 @ 10:35),  500 milliGRAM(s) (06-29-24 @ 22:23),  500 milliGRAM(s) (06-29-24 @ 10:01),  500 milliGRAM(s) (06-28-24 @ 21:06),  500 milliGRAM(s) (06-28-24 @ 10:11),  500 milliGRAM(s) (06-27-24 @ 22:22),  500 milliGRAM(s) (06-27-24 @ 12:44),  500 milliGRAM(s) (06-26-24 @ 22:19),  500 milliGRAM(s) (06-26-24 @ 11:17),  500 milliGRAM(s) (06-25-24 @ 21:48),  500 milliGRAM(s) (06-25-24 @ 11:09),  500 milliGRAM(s) (06-24-24 @ 21:01),  500 milliGRAM(s) (06-24-24 @ 10:02),  500 milliGRAM(s) (06-23-24 @ 21:32),  500 milliGRAM(s) (06-23-24 @ 10:11),  500 milliGRAM(s) (06-22-24 @ 22:13),  500 milliGRAM(s) (06-22-24 @ 10:01),  500 milliGRAM(s) (06-21-24 @ 21:46),  500 milliGRAM(s) (06-21-24 @ 11:21),  500 milliGRAM(s) (06-20-24 @ 21:02),  500 milliGRAM(s) (06-20-24 @ 10:17),  500 milliGRAM(s) (06-19-24 @ 22:01),  500 milliGRAM(s) (06-19-24 @ 10:32),  500 milliGRAM(s) (06-18-24 @ 21:25),  500 milliGRAM(s) (06-18-24 @ 08:06)

## 2024-07-02 NOTE — PROGRESS NOTE ADULT - NSPROGADDITIONALINFOA_GEN_ALL_CORE
28 y.o. P0 at 32w6d admitted for PPROM at 30w5d s/p ACS, latency antibiotics, Mg without s/sx of chorioamnionitis or abruption. No acute events overnight and patient denies any vaginal bleeding, purulent leakage of fluid. Overall reassuring abdominal exam without tenderness. Stable vitals without leukocytosis on prior labs.     - CTM with plan for IOL at 34w  - Eligible for rescue ACS if indicated; not a candidate for Mg for neuroprotection \  - Continue BID NST 28 y.o. P0 at 32w6d admitted for PPROM at 30w5d s/p ACS, latency antibiotics, Mg without s/sx of chorioamnionitis or abruption. No acute events overnight and patient denies any vaginal bleeding, purulent leakage of fluid. Overall reassuring abdominal exam without tenderness. Stable vitals without leukocytosis on prior labs. Fetal status reassuring with reactive NST this AM, baseline 140, moderate variability, accels, no decels. Irritability but no regular contractions on toco.     - CTM with plan for IOL at 34w  - Eligible for rescue ACS if indicated; not a candidate for Mg for neuroprotection \  - Continue BID NST

## 2024-07-02 NOTE — PROGRESS NOTE ADULT - ASSESSMENT
A/P: 29yo  @32w5d transferred from [facility], admitted with PPROM @30wk5d on . Patient is clinically stable with no signs of infection or PTL.    #PPROM  - s/p Mg (-)  - s/p Indocin  - s/p betamethasone (-)  - Candidate for rescue BMZ on   - s/p ampicillin (-)  - s/p amoxicillin (-)   - Afebrile, no signs/symptoms of infection    #HSV  - No lesions on SSE  - Continue Valtrex 500mg BID for ppx    #Palpitations  - Outpatient TTE (): EF 65-70%, V ventricular systolic function hyperdynamic. No valvular disease.  - s/p Holter monitor  - Cardio OB recs: no issues from cardiac perspective expected for delivery/anesthesia    #Fetal wellbeing  - NST BID  - ATU (): vertex, posterior placenta, JUAN 5.56, MVP 2.6, BPP 8/8  - ASTU (): vtx, post, JUAN 7.06, BPP 8/8    #Maternal wellbeing  - Contraception: undecided, but declines BS or IUD  - HSQ for DVT ppx  - PNV  - Fe, Vit C  - Regular diet    #Disposition  - Candidate for exp mgmt until delivery at 34-36w unless change in fetal or maternal status    Brenton Ohara, PGY-2 A/P: 29yo  @32w6d transferred from [facility], admitted with PPROM @30wk5d on . Patient is clinically stable with no signs of infection or PTL.    #PPROM  - s/p Mg (-)  - s/p Indocin  - s/p betamethasone (-)  - Candidate for rescue BMZ on   - s/p ampicillin (-)  - s/p amoxicillin (-)   - Afebrile, no signs/symptoms of infection    #HSV  - No lesions on SSE  - Continue Valtrex 500mg BID for ppx    #Palpitations  - Outpatient TTE (): EF 65-70%, V ventricular systolic function hyperdynamic. No valvular disease.  - s/p Holter monitor  - Cardio OB recs: no issues from cardiac perspective expected for delivery/anesthesia    #Fetal wellbeing  - NST BID  - ATU (): vertex, posterior placenta, JUAN 5.56, MVP 2.6, BPP 8/8  - ASTU (): vtx, post, JUAN 7.06, BPP 8/8    #Maternal wellbeing  - Contraception: undecided, but declines BS or IUD  - HSQ for DVT ppx  - PNV  - Fe, Vit C  - Regular diet    #Disposition  - Candidate for exp mgmt until delivery at 34-36w unless change in fetal or maternal status    Brenton Ohara, PGY-2

## 2024-07-03 LAB
ANISOCYTOSIS BLD QL: SLIGHT — SIGNIFICANT CHANGE UP
BASOPHILS # BLD AUTO: 0 K/UL — SIGNIFICANT CHANGE UP (ref 0–0.2)
BASOPHILS NFR BLD AUTO: 0 % — SIGNIFICANT CHANGE UP (ref 0–2)
BLD GP AB SCN SERPL QL: POSITIVE — SIGNIFICANT CHANGE UP
BURR CELLS BLD QL SMEAR: PRESENT — SIGNIFICANT CHANGE UP
EOSINOPHIL # BLD AUTO: 0.1 K/UL — SIGNIFICANT CHANGE UP (ref 0–0.5)
EOSINOPHIL NFR BLD AUTO: 0.9 % — SIGNIFICANT CHANGE UP (ref 0–6)
GIANT PLATELETS BLD QL SMEAR: PRESENT — SIGNIFICANT CHANGE UP
HCT VFR BLD CALC: 31.6 % — LOW (ref 34.5–45)
HGB BLD-MCNC: 10.6 G/DL — LOW (ref 11.5–15.5)
IANC: 6.89 K/UL — SIGNIFICANT CHANGE UP (ref 1.8–7.4)
LYMPHOCYTES # BLD AUTO: 0.58 K/UL — LOW (ref 1–3.3)
LYMPHOCYTES # BLD AUTO: 5.4 % — LOW (ref 13–44)
MACROCYTES BLD QL: SLIGHT — SIGNIFICANT CHANGE UP
MANUAL SMEAR VERIFICATION: SIGNIFICANT CHANGE UP
MCHC RBC-ENTMCNC: 29.3 PG — SIGNIFICANT CHANGE UP (ref 27–34)
MCHC RBC-ENTMCNC: 33.5 GM/DL — SIGNIFICANT CHANGE UP (ref 32–36)
MCV RBC AUTO: 87.3 FL — SIGNIFICANT CHANGE UP (ref 80–100)
METAMYELOCYTES # FLD: 0.9 % — SIGNIFICANT CHANGE UP (ref 0–1)
MICROCYTES BLD QL: SLIGHT — SIGNIFICANT CHANGE UP
MONOCYTES # BLD AUTO: 0.95 K/UL — HIGH (ref 0–0.9)
MONOCYTES NFR BLD AUTO: 8.9 % — SIGNIFICANT CHANGE UP (ref 2–14)
MYELOCYTES NFR BLD: 2.7 % — HIGH (ref 0–0)
NEUTROPHILS # BLD AUTO: 7.92 K/UL — HIGH (ref 1.8–7.4)
NEUTROPHILS NFR BLD AUTO: 71.4 % — SIGNIFICANT CHANGE UP (ref 43–77)
NEUTS BAND # BLD: 2.7 % — SIGNIFICANT CHANGE UP (ref 0–6)
NRBC # BLD: 2 /100 WBCS — HIGH (ref 0–0)
PLAT MORPH BLD: NORMAL — SIGNIFICANT CHANGE UP
PLATELET # BLD AUTO: 228 K/UL — SIGNIFICANT CHANGE UP (ref 150–400)
PLATELET COUNT - ESTIMATE: NORMAL — SIGNIFICANT CHANGE UP
POIKILOCYTOSIS BLD QL AUTO: SLIGHT — SIGNIFICANT CHANGE UP
RBC # BLD: 3.62 M/UL — LOW (ref 3.8–5.2)
RBC # FLD: 13.3 % — SIGNIFICANT CHANGE UP (ref 10.3–14.5)
RBC BLD AUTO: NORMAL — SIGNIFICANT CHANGE UP
VARIANT LYMPHS # BLD: 7.1 % — HIGH (ref 0–6)
WBC # BLD: 10.69 K/UL — HIGH (ref 3.8–10.5)
WBC # FLD AUTO: 10.69 K/UL — HIGH (ref 3.8–10.5)

## 2024-07-03 PROCEDURE — 86077 PHYS BLOOD BANK SERV XMATCH: CPT

## 2024-07-03 PROCEDURE — 99232 SBSQ HOSP IP/OBS MODERATE 35: CPT | Mod: GC

## 2024-07-03 RX ADMIN — Medication 1 TABLET(S): at 10:59

## 2024-07-03 RX ADMIN — Medication 325 MILLIGRAM(S): at 10:59

## 2024-07-03 RX ADMIN — HEPARIN SODIUM 5000 UNIT(S): 50 INJECTION, SOLUTION INTRAVENOUS at 21:22

## 2024-07-03 RX ADMIN — HEPARIN SODIUM 5000 UNIT(S): 50 INJECTION, SOLUTION INTRAVENOUS at 10:59

## 2024-07-03 RX ADMIN — Medication 1 APPLICATION(S): at 05:02

## 2024-07-03 RX ADMIN — Medication 500 MILLIGRAM(S): at 10:59

## 2024-07-03 RX ADMIN — VALACYCLOVIR HYDROCHLORIDE 500 MILLIGRAM(S): 500 TABLET, FILM COATED ORAL at 10:59

## 2024-07-03 RX ADMIN — VALACYCLOVIR HYDROCHLORIDE 500 MILLIGRAM(S): 500 TABLET, FILM COATED ORAL at 21:22

## 2024-07-03 NOTE — PROGRESS NOTE ADULT - ASSESSMENT
A/P: 27yo  @33w0d transferred from [facility], admitted with PPROM @30wk5d on . Patient is clinically stable with no signs of infection or PTL.    #PPROM  - s/p Mg (-)  - s/p Indocin  - s/p betamethasone (-)  - Candidate for rescue BMZ on   - s/p ampicillin (-)  - s/p amoxicillin (-)   - Afebrile, no signs/symptoms of infection    #HSV  - No lesions on SSE  - Continue Valtrex 500mg BID for ppx    #Palpitations  - Outpatient TTE (): EF 65-70%, V ventricular systolic function hyperdynamic. No valvular disease.  - s/p Holter monitor  - Cardio OB recs: no issues from cardiac perspective expected for delivery/anesthesia    #Fetal wellbeing  - NST BID  - ATU (): vtx, post, JUAN 11.59, BPP 8/8  - ATU (): vtx, post, JUAN 3.45, MVP<2, BPP 6/8, EFW 1766g (58%), AC 63%      #Maternal wellbeing  - Contraception: undecided, but declines BS or IUD  - HSQ for DVT ppx  - PNV  - Fe, Vit C  - Regular diet    #Disposition  - Candidate for exp mgmt until delivery at 34-36w unless change in fetal or maternal status    Brenton Ohara, PGY-3

## 2024-07-03 NOTE — PROGRESS NOTE ADULT - ATTENDING COMMENTS
MFM ATTENDING    27yo P0 at 33w0d with PPROM.     denies vb/ctx, reports normal fetal movement. endorses ongoing small volume leakage of clear fluid.     s/p acs -  s/p mag -  s/p NICU consult   fetus AGA 1766g 58% on   s/p indocin  s/p latency abx    [] eligible for rescue ACS if change in status    Continue valtrex  Continue current management, routine ap care.   Recommend goal for delivery at 34 weeks.   MOD: planned vaginal, with  reserved for routine obstetrical indications.     Discussed again timing of delivery with patient. She wants to wait until 34w5d for delivery because her  will be away and wants her to be present during her induction and delivery. Discussed with patient that delivery may be indicated sooner if there is a change in maternal and/or fetal status.     All questions answered to patients satisfaction.

## 2024-07-03 NOTE — PROGRESS NOTE ADULT - SUBJECTIVE AND OBJECTIVE BOX
PGY3 Antepartum Note    Patient seen and examined at bedside, no acute overnight events. No acute complaints. Pt reports minimal clear leakage of fluid and +FM. She denies VB, ctx, HA, epigastric pain, blurred vision, CP, SOB, N/V, fevers, and chills.    T(F): 98.3 (05:31)  HR: 86 (05:31)  BP: 125/58 (05:31)  RR: 17 (05:31)    Gen: NAD  Cardio: rrr, s1/s2  Pulm: ca b/l  Abd: gravid, nontender, no palpable contractions  Ext: no edema,  no calf tenderness  SVE: deferred    Medications:  (ADM OVERRIDE): 1 Each (06-18-24 @ 03:50)  (ADM OVERRIDE): 1 Each (06-18-24 @ 03:50)  amoxicillin: 500 milliGRAM(s) (06-24-24 @ 21:01),  500 milliGRAM(s) (06-24-24 @ 13:44),  500 milliGRAM(s) (06-24-24 @ 06:03),  500 milliGRAM(s) (06-23-24 @ 21:31),  500 milliGRAM(s) (06-23-24 @ 13:43),  500 milliGRAM(s) (06-23-24 @ 06:16),  500 milliGRAM(s) (06-22-24 @ 22:13),  500 milliGRAM(s) (06-22-24 @ 13:36),  500 milliGRAM(s) (06-22-24 @ 06:43),  500 milliGRAM(s) (06-21-24 @ 21:45),  500 milliGRAM(s) (06-21-24 @ 14:53),  500 milliGRAM(s) (06-21-24 @ 05:12),  500 milliGRAM(s) (06-20-24 @ 21:03),  500 milliGRAM(s) (06-20-24 @ 15:08),  500 milliGRAM(s) (06-20-24 @ 06:05)  ampicillin  IVPB: 200 mL/Hr (06-19-24 @ 22:04),  200 mL/Hr (06-19-24 @ 17:17),  200 mL/Hr (06-19-24 @ 10:31),  200 mL/Hr (06-19-24 @ 03:49),  200 mL/Hr (06-18-24 @ 22:13),  200 mL/Hr (06-18-24 @ 17:33),  200 mL/Hr (06-18-24 @ 09:55)  ampicillin  IVPB: 200 mL/Hr (06-18-24 @ 04:05)  ascorbic acid: 500 milliGRAM(s) (07-02-24 @ 12:05),  500 milliGRAM(s) (07-01-24 @ 10:32),  500 milliGRAM(s) (06-30-24 @ 10:35),  500 milliGRAM(s) (06-29-24 @ 10:01),  500 milliGRAM(s) (06-28-24 @ 10:12),  500 milliGRAM(s) (06-27-24 @ 12:44),  500 milliGRAM(s) (06-26-24 @ 11:18),  500 milliGRAM(s) (06-25-24 @ 11:09),  500 milliGRAM(s) (06-24-24 @ 10:02),  500 milliGRAM(s) (06-23-24 @ 10:11),  500 milliGRAM(s) (06-22-24 @ 10:02),  500 milliGRAM(s) (06-21-24 @ 11:20),  500 milliGRAM(s) (06-20-24 @ 10:17),  500 milliGRAM(s) (06-19-24 @ 10:59)  betamethasone Injectable: 12 milliGRAM(s) (06-18-24 @ 17:30)  chlorhexidine 2% Cloths: 1 Application(s) (07-03-24 @ 05:02),  1 Application(s) (07-02-24 @ 05:29),  1 Application(s) (07-01-24 @ 05:29),  1 Application(s) (06-30-24 @ 06:03),  1 Application(s) (06-28-24 @ 21:00),  1 Application(s) (06-28-24 @ 06:10),  1 Application(s) (06-27-24 @ 12:44),  1 Application(s) (06-26-24 @ 06:27),  1 Application(s) (06-25-24 @ 11:08),  1 Application(s) (06-24-24 @ 06:03),  1 Application(s) (06-23-24 @ 06:16),  1 Application(s) (06-22-24 @ 06:43),  1 Application(s) (06-21-24 @ 11:18),  1 Application(s) (06-20-24 @ 10:16),  1 Application(s) (06-19-24 @ 10:59),  1 Application(s) (06-18-24 @ 11:49)  diphtheria/tetanus/pertussis (acellular) Vaccine (Adacel): 0.5 milliLiter(s) (06-18-24 @ 17:27)  ferrous    sulfate: 325 milliGRAM(s) (07-02-24 @ 12:04),  325 milliGRAM(s) (07-01-24 @ 10:31),  325 milliGRAM(s) (06-30-24 @ 10:35),  325 milliGRAM(s) (06-29-24 @ 10:00),  325 milliGRAM(s) (06-28-24 @ 10:11),  325 milliGRAM(s) (06-27-24 @ 12:44),  325 milliGRAM(s) (06-26-24 @ 11:18),  325 milliGRAM(s) (06-25-24 @ 11:09),  325 milliGRAM(s) (06-24-24 @ 10:02),  325 milliGRAM(s) (06-23-24 @ 10:11),  325 milliGRAM(s) (06-22-24 @ 10:02),  325 milliGRAM(s) (06-21-24 @ 11:21),  325 milliGRAM(s) (06-20-24 @ 10:17),  325 milliGRAM(s) (06-19-24 @ 10:59)  heparin   Injectable: 5000 Unit(s) (07-02-24 @ 21:57),  5000 Unit(s) (07-02-24 @ 12:05),  5000 Unit(s) (07-01-24 @ 21:02),  5000 Unit(s) (07-01-24 @ 10:32),  5000 Unit(s) (06-30-24 @ 21:21),  5000 Unit(s) (06-30-24 @ 10:51),  5000 Unit(s) (06-29-24 @ 22:23),  5000 Unit(s) (06-29-24 @ 10:00),  5000 Unit(s) (06-28-24 @ 21:07),  5000 Unit(s) (06-28-24 @ 10:12),  5000 Unit(s) (06-27-24 @ 22:23),  5000 Unit(s) (06-27-24 @ 12:44),  5000 Unit(s) (06-26-24 @ 22:19),  5000 Unit(s) (06-26-24 @ 11:18),  5000 Unit(s) (06-25-24 @ 22:00),  5000 Unit(s) (06-25-24 @ 11:10),  5000 Unit(s) (06-24-24 @ 21:03),  5000 Unit(s) (06-24-24 @ 10:03),  5000 Unit(s) (06-23-24 @ 21:31),  5000 Unit(s) (06-23-24 @ 10:11),  5000 Unit(s) (06-22-24 @ 22:13),  5000 Unit(s) (06-22-24 @ 10:01),  5000 Unit(s) (06-21-24 @ 21:45),  5000 Unit(s) (06-21-24 @ 11:20),  5000 Unit(s) (06-20-24 @ 21:04),  5000 Unit(s) (06-20-24 @ 10:17),  5000 Unit(s) (06-19-24 @ 22:01),  5000 Unit(s) (06-19-24 @ 10:31),  5000 Unit(s) (06-18-24 @ 22:15),  5000 Unit(s) (06-18-24 @ 11:47)  indomethacin: 25 milliGRAM(s) (06-18-24 @ 09:58),  25 milliGRAM(s) (06-18-24 @ 04:08)  lactated ringers.: 50 mL/Hr (06-18-24 @ 09:10)  magnesium sulfate Infusion: 50 mL/Hr (06-18-24 @ 05:00)  prenatal multivitamin: 1 Tablet(s) (07-02-24 @ 12:04),  1 Tablet(s) (07-01-24 @ 10:35),  1 Tablet(s) (06-30-24 @ 10:35),  1 Tablet(s) (06-29-24 @ 10:00),  1 Tablet(s) (06-28-24 @ 10:11),  1 Tablet(s) (06-27-24 @ 12:44),  1 Tablet(s) (06-26-24 @ 11:18),  1 Tablet(s) (06-25-24 @ 11:09),  1 Tablet(s) (06-24-24 @ 10:03),  1 Tablet(s) (06-23-24 @ 10:11),  1 Tablet(s) (06-22-24 @ 10:02),  1 Tablet(s) (06-21-24 @ 11:20),  1 Tablet(s) (06-20-24 @ 10:16),  1 Tablet(s) (06-19-24 @ 10:59)  valACYclovir: 500 milliGRAM(s) (07-02-24 @ 21:57),  500 milliGRAM(s) (07-02-24 @ 12:04),  500 milliGRAM(s) (07-01-24 @ 21:02),  500 milliGRAM(s) (07-01-24 @ 10:31),  500 milliGRAM(s) (06-30-24 @ 21:20),  500 milliGRAM(s) (06-30-24 @ 10:35),  500 milliGRAM(s) (06-29-24 @ 22:23),  500 milliGRAM(s) (06-29-24 @ 10:01),  500 milliGRAM(s) (06-28-24 @ 21:06),  500 milliGRAM(s) (06-28-24 @ 10:11),  500 milliGRAM(s) (06-27-24 @ 22:22),  500 milliGRAM(s) (06-27-24 @ 12:44),  500 milliGRAM(s) (06-26-24 @ 22:19),  500 milliGRAM(s) (06-26-24 @ 11:17),  500 milliGRAM(s) (06-25-24 @ 21:48),  500 milliGRAM(s) (06-25-24 @ 11:09),  500 milliGRAM(s) (06-24-24 @ 21:01),  500 milliGRAM(s) (06-24-24 @ 10:02),  500 milliGRAM(s) (06-23-24 @ 21:32),  500 milliGRAM(s) (06-23-24 @ 10:11),  500 milliGRAM(s) (06-22-24 @ 22:13),  500 milliGRAM(s) (06-22-24 @ 10:01),  500 milliGRAM(s) (06-21-24 @ 21:46),  500 milliGRAM(s) (06-21-24 @ 11:21),  500 milliGRAM(s) (06-20-24 @ 21:02),  500 milliGRAM(s) (06-20-24 @ 10:17),  500 milliGRAM(s) (06-19-24 @ 22:01),  500 milliGRAM(s) (06-19-24 @ 10:32),  500 milliGRAM(s) (06-18-24 @ 21:25),  500 milliGRAM(s) (06-18-24 @ 08:06)

## 2024-07-04 PROCEDURE — 99232 SBSQ HOSP IP/OBS MODERATE 35: CPT | Mod: GC

## 2024-07-04 RX ADMIN — Medication 500 MILLIGRAM(S): at 10:40

## 2024-07-04 RX ADMIN — VALACYCLOVIR HYDROCHLORIDE 500 MILLIGRAM(S): 500 TABLET, FILM COATED ORAL at 21:01

## 2024-07-04 RX ADMIN — HEPARIN SODIUM 5000 UNIT(S): 50 INJECTION, SOLUTION INTRAVENOUS at 21:02

## 2024-07-04 RX ADMIN — Medication 1 APPLICATION(S): at 05:39

## 2024-07-04 RX ADMIN — Medication 325 MILLIGRAM(S): at 10:40

## 2024-07-04 RX ADMIN — VALACYCLOVIR HYDROCHLORIDE 500 MILLIGRAM(S): 500 TABLET, FILM COATED ORAL at 10:41

## 2024-07-04 RX ADMIN — Medication 1 TABLET(S): at 10:40

## 2024-07-04 RX ADMIN — HEPARIN SODIUM 5000 UNIT(S): 50 INJECTION, SOLUTION INTRAVENOUS at 10:41

## 2024-07-04 NOTE — PROGRESS NOTE ADULT - ATTENDING COMMENTS
MFM Attending     I have personally seen and counseled this patient.  Clinically unchanged, requires inpatient monitoring unyil delivery  The patient indicated she understands and agrees with the plan of care.

## 2024-07-04 NOTE — PROGRESS NOTE ADULT - SUBJECTIVE AND OBJECTIVE BOX
PGY 3 Antepartum Note    Patient seen and examined at bedside in NAD. Denies any CTX, LOF.  Reports good fetal movement. She denies VB, ctx, HA, epigastric pain, blurred vision, CP, SOB, N/V, fevers, and chills.    Vital Signs Last 24 Hrs  T(C): 36.6 (03 Jul 2024 22:15), Max: 37.1 (03 Jul 2024 13:30)  T(F): 97.9 (03 Jul 2024 22:15), Max: 98.8 (03 Jul 2024 17:10)  HR: 85 (03 Jul 2024 22:15) (82 - 96)  BP: 112/60 (03 Jul 2024 22:15) (112/60 - 126/72)  BP(mean): --  ABP: --  ABP(mean): --  RR: 16 (03 Jul 2024 22:15) (16 - 17)  SpO2: 98% (03 Jul 2024 22:15) (96% - 100%)    O2 Parameters below as of 03 Jul 2024 22:15  Patient On (Oxygen Delivery Method): room air        Gen: NAD  Cardio: rrr, s1/s2  Pulm: ca b/l  Abd: gravid, nontender, no palpable contractions  Ext: no edema,  no calf enderness  SVE: deferred    Medications:  (ADM OVERRIDE): 1 Each (06-18-24 @ 03:50)  (ADM OVERRIDE): 1 Each (06-18-24 @ 03:50)  amoxicillin: 500 milliGRAM(s) (06-24-24 @ 21:01),  500 milliGRAM(s) (06-24-24 @ 13:44),  500 milliGRAM(s) (06-24-24 @ 06:03),  500 milliGRAM(s) (06-23-24 @ 21:31),  500 milliGRAM(s) (06-23-24 @ 13:43),  500 milliGRAM(s) (06-23-24 @ 06:16),  500 milliGRAM(s) (06-22-24 @ 22:13),  500 milliGRAM(s) (06-22-24 @ 13:36),  500 milliGRAM(s) (06-22-24 @ 06:43),  500 milliGRAM(s) (06-21-24 @ 21:45),  500 milliGRAM(s) (06-21-24 @ 14:53),  500 milliGRAM(s) (06-21-24 @ 05:12),  500 milliGRAM(s) (06-20-24 @ 21:03),  500 milliGRAM(s) (06-20-24 @ 15:08),  500 milliGRAM(s) (06-20-24 @ 06:05)  ampicillin  IVPB: 200 mL/Hr (06-19-24 @ 22:04),  200 mL/Hr (06-19-24 @ 17:17),  200 mL/Hr (06-19-24 @ 10:31),  200 mL/Hr (06-19-24 @ 03:49),  200 mL/Hr (06-18-24 @ 22:13),  200 mL/Hr (06-18-24 @ 17:33),  200 mL/Hr (06-18-24 @ 09:55)  ampicillin  IVPB: 200 mL/Hr (06-18-24 @ 04:05)  ascorbic acid: 500 milliGRAM(s) (07-03-24 @ 10:59),  500 milliGRAM(s) (07-02-24 @ 12:05),  500 milliGRAM(s) (07-01-24 @ 10:32),  500 milliGRAM(s) (06-30-24 @ 10:35),  500 milliGRAM(s) (06-29-24 @ 10:01),  500 milliGRAM(s) (06-28-24 @ 10:12),  500 milliGRAM(s) (06-27-24 @ 12:44),  500 milliGRAM(s) (06-26-24 @ 11:18),  500 milliGRAM(s) (06-25-24 @ 11:09),  500 milliGRAM(s) (06-24-24 @ 10:02),  500 milliGRAM(s) (06-23-24 @ 10:11),  500 milliGRAM(s) (06-22-24 @ 10:02),  500 milliGRAM(s) (06-21-24 @ 11:20),  500 milliGRAM(s) (06-20-24 @ 10:17),  500 milliGRAM(s) (06-19-24 @ 10:59)  betamethasone Injectable: 12 milliGRAM(s) (06-18-24 @ 17:30)  chlorhexidine 2% Cloths: 1 Application(s) (07-03-24 @ 05:02),  1 Application(s) (07-02-24 @ 05:29),  1 Application(s) (07-01-24 @ 05:29),  1 Application(s) (06-30-24 @ 06:03),  1 Application(s) (06-28-24 @ 21:00),  1 Application(s) (06-28-24 @ 06:10),  1 Application(s) (06-27-24 @ 12:44),  1 Application(s) (06-26-24 @ 06:27),  1 Application(s) (06-25-24 @ 11:08),  1 Application(s) (06-24-24 @ 06:03),  1 Application(s) (06-23-24 @ 06:16),  1 Application(s) (06-22-24 @ 06:43),  1 Application(s) (06-21-24 @ 11:18),  1 Application(s) (06-20-24 @ 10:16),  1 Application(s) (06-19-24 @ 10:59),  1 Application(s) (06-18-24 @ 11:49)  diphtheria/tetanus/pertussis (acellular) Vaccine (Adacel): 0.5 milliLiter(s) (06-18-24 @ 17:27)  ferrous    sulfate: 325 milliGRAM(s) (07-03-24 @ 10:59),  325 milliGRAM(s) (07-02-24 @ 12:04),  325 milliGRAM(s) (07-01-24 @ 10:31),  325 milliGRAM(s) (06-30-24 @ 10:35),  325 milliGRAM(s) (06-29-24 @ 10:00),  325 milliGRAM(s) (06-28-24 @ 10:11),  325 milliGRAM(s) (06-27-24 @ 12:44),  325 milliGRAM(s) (06-26-24 @ 11:18),  325 milliGRAM(s) (06-25-24 @ 11:09),  325 milliGRAM(s) (06-24-24 @ 10:02),  325 milliGRAM(s) (06-23-24 @ 10:11),  325 milliGRAM(s) (06-22-24 @ 10:02),  325 milliGRAM(s) (06-21-24 @ 11:21),  325 milliGRAM(s) (06-20-24 @ 10:17),  325 milliGRAM(s) (06-19-24 @ 10:59)  heparin   Injectable: 5000 Unit(s) (07-03-24 @ 21:22),  5000 Unit(s) (07-03-24 @ 10:59),  5000 Unit(s) (07-02-24 @ 21:57),  5000 Unit(s) (07-02-24 @ 12:05),  5000 Unit(s) (07-01-24 @ 21:02),  5000 Unit(s) (07-01-24 @ 10:32),  5000 Unit(s) (06-30-24 @ 21:21),  5000 Unit(s) (06-30-24 @ 10:51),  5000 Unit(s) (06-29-24 @ 22:23),  5000 Unit(s) (06-29-24 @ 10:00),  5000 Unit(s) (06-28-24 @ 21:07),  5000 Unit(s) (06-28-24 @ 10:12),  5000 Unit(s) (06-27-24 @ 22:23),  5000 Unit(s) (06-27-24 @ 12:44),  5000 Unit(s) (06-26-24 @ 22:19),  5000 Unit(s) (06-26-24 @ 11:18),  5000 Unit(s) (06-25-24 @ 22:00),  5000 Unit(s) (06-25-24 @ 11:10),  5000 Unit(s) (06-24-24 @ 21:03),  5000 Unit(s) (06-24-24 @ 10:03),  5000 Unit(s) (06-23-24 @ 21:31),  5000 Unit(s) (06-23-24 @ 10:11),  5000 Unit(s) (06-22-24 @ 22:13),  5000 Unit(s) (06-22-24 @ 10:01),  5000 Unit(s) (06-21-24 @ 21:45),  5000 Unit(s) (06-21-24 @ 11:20),  5000 Unit(s) (06-20-24 @ 21:04),  5000 Unit(s) (06-20-24 @ 10:17),  5000 Unit(s) (06-19-24 @ 22:01),  5000 Unit(s) (06-19-24 @ 10:31),  5000 Unit(s) (06-18-24 @ 22:15),  5000 Unit(s) (06-18-24 @ 11:47)  indomethacin: 25 milliGRAM(s) (06-18-24 @ 09:58),  25 milliGRAM(s) (06-18-24 @ 04:08)  lactated ringers.: 50 mL/Hr (06-18-24 @ 09:10)  magnesium sulfate Infusion: 50 mL/Hr (06-18-24 @ 05:00)  prenatal multivitamin: 1 Tablet(s) (07-03-24 @ 10:59),  1 Tablet(s) (07-02-24 @ 12:04),  1 Tablet(s) (07-01-24 @ 10:35),  1 Tablet(s) (06-30-24 @ 10:35),  1 Tablet(s) (06-29-24 @ 10:00),  1 Tablet(s) (06-28-24 @ 10:11),  1 Tablet(s) (06-27-24 @ 12:44),  1 Tablet(s) (06-26-24 @ 11:18),  1 Tablet(s) (06-25-24 @ 11:09),  1 Tablet(s) (06-24-24 @ 10:03),  1 Tablet(s) (06-23-24 @ 10:11),  1 Tablet(s) (06-22-24 @ 10:02),  1 Tablet(s) (06-21-24 @ 11:20),  1 Tablet(s) (06-20-24 @ 10:16),  1 Tablet(s) (06-19-24 @ 10:59)  valACYclovir: 500 milliGRAM(s) (07-03-24 @ 21:22),  500 milliGRAM(s) (07-03-24 @ 10:59),  500 milliGRAM(s) (07-02-24 @ 21:57),  500 milliGRAM(s) (07-02-24 @ 12:04),  500 milliGRAM(s) (07-01-24 @ 21:02),  500 milliGRAM(s) (07-01-24 @ 10:31),  500 milliGRAM(s) (06-30-24 @ 21:20),  500 milliGRAM(s) (06-30-24 @ 10:35),  500 milliGRAM(s) (06-29-24 @ 22:23),  500 milliGRAM(s) (06-29-24 @ 10:01),  500 milliGRAM(s) (06-28-24 @ 21:06),  500 milliGRAM(s) (06-28-24 @ 10:11),  500 milliGRAM(s) (06-27-24 @ 22:22),  500 milliGRAM(s) (06-27-24 @ 12:44),  500 milliGRAM(s) (06-26-24 @ 22:19),  500 milliGRAM(s) (06-26-24 @ 11:17),  500 milliGRAM(s) (06-25-24 @ 21:48),  500 milliGRAM(s) (06-25-24 @ 11:09),  500 milliGRAM(s) (06-24-24 @ 21:01),  500 milliGRAM(s) (06-24-24 @ 10:02),  500 milliGRAM(s) (06-23-24 @ 21:32),  500 milliGRAM(s) (06-23-24 @ 10:11),  500 milliGRAM(s) (06-22-24 @ 22:13),  500 milliGRAM(s) (06-22-24 @ 10:01),  500 milliGRAM(s) (06-21-24 @ 21:46),  500 milliGRAM(s) (06-21-24 @ 11:21),  500 milliGRAM(s) (06-20-24 @ 21:02),  500 milliGRAM(s) (06-20-24 @ 10:17),  500 milliGRAM(s) (06-19-24 @ 22:01),  500 milliGRAM(s) (06-19-24 @ 10:32),  500 milliGRAM(s) (06-18-24 @ 21:25),  500 milliGRAM(s) (06-18-24 @ 08:06)         PGY 3 Antepartum Note    Patient seen and examined at bedside in [facility]. She reports small trickles of clear fluid. Reports good fetal movement. She denies VB, ctx, CP, SOB, N/V, fevers, and chills.    Vital Signs Last 24 Hrs  T(C): 36.6 (03 Jul 2024 22:15), Max: 37.1 (03 Jul 2024 13:30)  T(F): 97.9 (03 Jul 2024 22:15), Max: 98.8 (03 Jul 2024 17:10)  HR: 85 (03 Jul 2024 22:15) (82 - 96)  BP: 112/60 (03 Jul 2024 22:15) (112/60 - 126/72)  BP(mean): --  ABP: --  ABP(mean): --  RR: 16 (03 Jul 2024 22:15) (16 - 17)  SpO2: 98% (03 Jul 2024 22:15) (96% - 100%)    O2 Parameters below as of 03 Jul 2024 22:15  Patient On (Oxygen Delivery Method): room air        Gen: NAD  Cardio: rrr, s1/s2  Pulm: ca b/l  Abd: gravid, nontender, no palpable contractions  Ext: no edema,  no calf enderness  SVE: deferred    Medications:  (ADM OVERRIDE): 1 Each (06-18-24 @ 03:50)  (ADM OVERRIDE): 1 Each (06-18-24 @ 03:50)  amoxicillin: 500 milliGRAM(s) (06-24-24 @ 21:01),  500 milliGRAM(s) (06-24-24 @ 13:44),  500 milliGRAM(s) (06-24-24 @ 06:03),  500 milliGRAM(s) (06-23-24 @ 21:31),  500 milliGRAM(s) (06-23-24 @ 13:43),  500 milliGRAM(s) (06-23-24 @ 06:16),  500 milliGRAM(s) (06-22-24 @ 22:13),  500 milliGRAM(s) (06-22-24 @ 13:36),  500 milliGRAM(s) (06-22-24 @ 06:43),  500 milliGRAM(s) (06-21-24 @ 21:45),  500 milliGRAM(s) (06-21-24 @ 14:53),  500 milliGRAM(s) (06-21-24 @ 05:12),  500 milliGRAM(s) (06-20-24 @ 21:03),  500 milliGRAM(s) (06-20-24 @ 15:08),  500 milliGRAM(s) (06-20-24 @ 06:05)  ampicillin  IVPB: 200 mL/Hr (06-19-24 @ 22:04),  200 mL/Hr (06-19-24 @ 17:17),  200 mL/Hr (06-19-24 @ 10:31),  200 mL/Hr (06-19-24 @ 03:49),  200 mL/Hr (06-18-24 @ 22:13),  200 mL/Hr (06-18-24 @ 17:33),  200 mL/Hr (06-18-24 @ 09:55)  ampicillin  IVPB: 200 mL/Hr (06-18-24 @ 04:05)  ascorbic acid: 500 milliGRAM(s) (07-03-24 @ 10:59),  500 milliGRAM(s) (07-02-24 @ 12:05),  500 milliGRAM(s) (07-01-24 @ 10:32),  500 milliGRAM(s) (06-30-24 @ 10:35),  500 milliGRAM(s) (06-29-24 @ 10:01),  500 milliGRAM(s) (06-28-24 @ 10:12),  500 milliGRAM(s) (06-27-24 @ 12:44),  500 milliGRAM(s) (06-26-24 @ 11:18),  500 milliGRAM(s) (06-25-24 @ 11:09),  500 milliGRAM(s) (06-24-24 @ 10:02),  500 milliGRAM(s) (06-23-24 @ 10:11),  500 milliGRAM(s) (06-22-24 @ 10:02),  500 milliGRAM(s) (06-21-24 @ 11:20),  500 milliGRAM(s) (06-20-24 @ 10:17),  500 milliGRAM(s) (06-19-24 @ 10:59)  betamethasone Injectable: 12 milliGRAM(s) (06-18-24 @ 17:30)  chlorhexidine 2% Cloths: 1 Application(s) (07-03-24 @ 05:02),  1 Application(s) (07-02-24 @ 05:29),  1 Application(s) (07-01-24 @ 05:29),  1 Application(s) (06-30-24 @ 06:03),  1 Application(s) (06-28-24 @ 21:00),  1 Application(s) (06-28-24 @ 06:10),  1 Application(s) (06-27-24 @ 12:44),  1 Application(s) (06-26-24 @ 06:27),  1 Application(s) (06-25-24 @ 11:08),  1 Application(s) (06-24-24 @ 06:03),  1 Application(s) (06-23-24 @ 06:16),  1 Application(s) (06-22-24 @ 06:43),  1 Application(s) (06-21-24 @ 11:18),  1 Application(s) (06-20-24 @ 10:16),  1 Application(s) (06-19-24 @ 10:59),  1 Application(s) (06-18-24 @ 11:49)  diphtheria/tetanus/pertussis (acellular) Vaccine (Adacel): 0.5 milliLiter(s) (06-18-24 @ 17:27)  ferrous    sulfate: 325 milliGRAM(s) (07-03-24 @ 10:59),  325 milliGRAM(s) (07-02-24 @ 12:04),  325 milliGRAM(s) (07-01-24 @ 10:31),  325 milliGRAM(s) (06-30-24 @ 10:35),  325 milliGRAM(s) (06-29-24 @ 10:00),  325 milliGRAM(s) (06-28-24 @ 10:11),  325 milliGRAM(s) (06-27-24 @ 12:44),  325 milliGRAM(s) (06-26-24 @ 11:18),  325 milliGRAM(s) (06-25-24 @ 11:09),  325 milliGRAM(s) (06-24-24 @ 10:02),  325 milliGRAM(s) (06-23-24 @ 10:11),  325 milliGRAM(s) (06-22-24 @ 10:02),  325 milliGRAM(s) (06-21-24 @ 11:21),  325 milliGRAM(s) (06-20-24 @ 10:17),  325 milliGRAM(s) (06-19-24 @ 10:59)  heparin   Injectable: 5000 Unit(s) (07-03-24 @ 21:22),  5000 Unit(s) (07-03-24 @ 10:59),  5000 Unit(s) (07-02-24 @ 21:57),  5000 Unit(s) (07-02-24 @ 12:05),  5000 Unit(s) (07-01-24 @ 21:02),  5000 Unit(s) (07-01-24 @ 10:32),  5000 Unit(s) (06-30-24 @ 21:21),  5000 Unit(s) (06-30-24 @ 10:51),  5000 Unit(s) (06-29-24 @ 22:23),  5000 Unit(s) (06-29-24 @ 10:00),  5000 Unit(s) (06-28-24 @ 21:07),  5000 Unit(s) (06-28-24 @ 10:12),  5000 Unit(s) (06-27-24 @ 22:23),  5000 Unit(s) (06-27-24 @ 12:44),  5000 Unit(s) (06-26-24 @ 22:19),  5000 Unit(s) (06-26-24 @ 11:18),  5000 Unit(s) (06-25-24 @ 22:00),  5000 Unit(s) (06-25-24 @ 11:10),  5000 Unit(s) (06-24-24 @ 21:03),  5000 Unit(s) (06-24-24 @ 10:03),  5000 Unit(s) (06-23-24 @ 21:31),  5000 Unit(s) (06-23-24 @ 10:11),  5000 Unit(s) (06-22-24 @ 22:13),  5000 Unit(s) (06-22-24 @ 10:01),  5000 Unit(s) (06-21-24 @ 21:45),  5000 Unit(s) (06-21-24 @ 11:20),  5000 Unit(s) (06-20-24 @ 21:04),  5000 Unit(s) (06-20-24 @ 10:17),  5000 Unit(s) (06-19-24 @ 22:01),  5000 Unit(s) (06-19-24 @ 10:31),  5000 Unit(s) (06-18-24 @ 22:15),  5000 Unit(s) (06-18-24 @ 11:47)  indomethacin: 25 milliGRAM(s) (06-18-24 @ 09:58),  25 milliGRAM(s) (06-18-24 @ 04:08)  lactated ringers.: 50 mL/Hr (06-18-24 @ 09:10)  magnesium sulfate Infusion: 50 mL/Hr (06-18-24 @ 05:00)  prenatal multivitamin: 1 Tablet(s) (07-03-24 @ 10:59),  1 Tablet(s) (07-02-24 @ 12:04),  1 Tablet(s) (07-01-24 @ 10:35),  1 Tablet(s) (06-30-24 @ 10:35),  1 Tablet(s) (06-29-24 @ 10:00),  1 Tablet(s) (06-28-24 @ 10:11),  1 Tablet(s) (06-27-24 @ 12:44),  1 Tablet(s) (06-26-24 @ 11:18),  1 Tablet(s) (06-25-24 @ 11:09),  1 Tablet(s) (06-24-24 @ 10:03),  1 Tablet(s) (06-23-24 @ 10:11),  1 Tablet(s) (06-22-24 @ 10:02),  1 Tablet(s) (06-21-24 @ 11:20),  1 Tablet(s) (06-20-24 @ 10:16),  1 Tablet(s) (06-19-24 @ 10:59)  valACYclovir: 500 milliGRAM(s) (07-03-24 @ 21:22),  500 milliGRAM(s) (07-03-24 @ 10:59),  500 milliGRAM(s) (07-02-24 @ 21:57),  500 milliGRAM(s) (07-02-24 @ 12:04),  500 milliGRAM(s) (07-01-24 @ 21:02),  500 milliGRAM(s) (07-01-24 @ 10:31),  500 milliGRAM(s) (06-30-24 @ 21:20),  500 milliGRAM(s) (06-30-24 @ 10:35),  500 milliGRAM(s) (06-29-24 @ 22:23),  500 milliGRAM(s) (06-29-24 @ 10:01),  500 milliGRAM(s) (06-28-24 @ 21:06),  500 milliGRAM(s) (06-28-24 @ 10:11),  500 milliGRAM(s) (06-27-24 @ 22:22),  500 milliGRAM(s) (06-27-24 @ 12:44),  500 milliGRAM(s) (06-26-24 @ 22:19),  500 milliGRAM(s) (06-26-24 @ 11:17),  500 milliGRAM(s) (06-25-24 @ 21:48),  500 milliGRAM(s) (06-25-24 @ 11:09),  500 milliGRAM(s) (06-24-24 @ 21:01),  500 milliGRAM(s) (06-24-24 @ 10:02),  500 milliGRAM(s) (06-23-24 @ 21:32),  500 milliGRAM(s) (06-23-24 @ 10:11),  500 milliGRAM(s) (06-22-24 @ 22:13),  500 milliGRAM(s) (06-22-24 @ 10:01),  500 milliGRAM(s) (06-21-24 @ 21:46),  500 milliGRAM(s) (06-21-24 @ 11:21),  500 milliGRAM(s) (06-20-24 @ 21:02),  500 milliGRAM(s) (06-20-24 @ 10:17),  500 milliGRAM(s) (06-19-24 @ 22:01),  500 milliGRAM(s) (06-19-24 @ 10:32),  500 milliGRAM(s) (06-18-24 @ 21:25),  500 milliGRAM(s) (06-18-24 @ 08:06)

## 2024-07-04 NOTE — PROGRESS NOTE ADULT - ASSESSMENT
A/P: 27yo  @33w1d transferred from [facility], admitted with PPROM @30wk5d on . Patient is clinically stable with no signs of infection or PTL.    #PPROM  - s/p Mg (-)  - s/p Indocin  - s/p betamethasone (-)  - Candidate for rescue BMZ on   - s/p ampicillin (-)  - s/p amoxicillin (-)   - Afebrile, no signs/symptoms of infection    #HSV  - No lesions on SSE  - Continue Valtrex 500mg BID for ppx    #Palpitations  - Outpatient TTE (): EF 65-70%, V ventricular systolic function hyperdynamic. No valvular disease.  - s/p Holter monitor  - Cardio OB recs: no issues from cardiac perspective expected for delivery/anesthesia    #Fetal wellbeing  - NST BID  - ATU (): vtx, post, JUAN 11.59, BPP 8/8  - ATU (): vtx, post, JUAN 3.45, MVP<2, BPP 6/8, EFW 1766g (58%), AC 63%      #Maternal wellbeing  - Contraception: undecided, but declines BS or IUD  - HSQ for DVT ppx  - PNV  - Fe, Vit C  - Regular diet    #Disposition  - Candidate for exp mgmt until delivery at 34-36w unless change in fetal or maternal status    Rosa Dennison, PGY-3

## 2024-07-05 ENCOUNTER — APPOINTMENT (OUTPATIENT)
Dept: ANTEPARTUM | Facility: CLINIC | Age: 29
End: 2024-07-05
Payer: MEDICAID

## 2024-07-05 ENCOUNTER — ASOB RESULT (OUTPATIENT)
Age: 29
End: 2024-07-05

## 2024-07-05 PROCEDURE — 99232 SBSQ HOSP IP/OBS MODERATE 35: CPT | Mod: GC

## 2024-07-05 PROCEDURE — 76819 FETAL BIOPHYS PROFIL W/O NST: CPT | Mod: 26

## 2024-07-05 RX ADMIN — Medication 500 MILLIGRAM(S): at 10:51

## 2024-07-05 RX ADMIN — HEPARIN SODIUM 5000 UNIT(S): 50 INJECTION, SOLUTION INTRAVENOUS at 10:50

## 2024-07-05 RX ADMIN — HEPARIN SODIUM 5000 UNIT(S): 50 INJECTION, SOLUTION INTRAVENOUS at 21:34

## 2024-07-05 RX ADMIN — Medication 325 MILLIGRAM(S): at 10:51

## 2024-07-05 RX ADMIN — VALACYCLOVIR HYDROCHLORIDE 500 MILLIGRAM(S): 500 TABLET, FILM COATED ORAL at 21:34

## 2024-07-05 RX ADMIN — Medication 1 APPLICATION(S): at 06:58

## 2024-07-05 RX ADMIN — Medication 1 TABLET(S): at 10:50

## 2024-07-05 RX ADMIN — VALACYCLOVIR HYDROCHLORIDE 500 MILLIGRAM(S): 500 TABLET, FILM COATED ORAL at 10:50

## 2024-07-05 NOTE — PROGRESS NOTE ADULT - SUBJECTIVE AND OBJECTIVE BOX
PGY3 Antepartum Note    Patient seen and examined at bedside in NAD. She reports small trickles of clear fluid. Reports good fetal movement. She denies VB, ctx, CP, SOB, N/V, fevers, and chills.    T(F): 98.3 (05:46)  HR: 89 (05:46)  BP: 102/59 (05:46)  RR: 17 (05:46)      Gen: NAD  Cardio: rrr, s1/s2  Pulm: ca b/l  Abd: gravid, nontender, no palpable contractions  Ext: no edema,  no calf tenderness  SVE: deferred    Medications:  (ADM OVERRIDE): 1 Each (06-18-24 @ 03:50)  (ADM OVERRIDE): 1 Each (06-18-24 @ 03:50)  amoxicillin: 500 milliGRAM(s) (06-24-24 @ 21:01),  500 milliGRAM(s) (06-24-24 @ 13:44),  500 milliGRAM(s) (06-24-24 @ 06:03),  500 milliGRAM(s) (06-23-24 @ 21:31),  500 milliGRAM(s) (06-23-24 @ 13:43),  500 milliGRAM(s) (06-23-24 @ 06:16),  500 milliGRAM(s) (06-22-24 @ 22:13),  500 milliGRAM(s) (06-22-24 @ 13:36),  500 milliGRAM(s) (06-22-24 @ 06:43),  500 milliGRAM(s) (06-21-24 @ 21:45),  500 milliGRAM(s) (06-21-24 @ 14:53),  500 milliGRAM(s) (06-21-24 @ 05:12),  500 milliGRAM(s) (06-20-24 @ 21:03),  500 milliGRAM(s) (06-20-24 @ 15:08),  500 milliGRAM(s) (06-20-24 @ 06:05)  ampicillin  IVPB: 200 mL/Hr (06-18-24 @ 04:05)  ampicillin  IVPB: 200 mL/Hr (06-19-24 @ 22:04),  200 mL/Hr (06-19-24 @ 17:17),  200 mL/Hr (06-19-24 @ 10:31),  200 mL/Hr (06-19-24 @ 03:49),  200 mL/Hr (06-18-24 @ 22:13),  200 mL/Hr (06-18-24 @ 17:33),  200 mL/Hr (06-18-24 @ 09:55)  ascorbic acid: 500 milliGRAM(s) (07-04-24 @ 10:40),  500 milliGRAM(s) (07-03-24 @ 10:59),  500 milliGRAM(s) (07-02-24 @ 12:05),  500 milliGRAM(s) (07-01-24 @ 10:32),  500 milliGRAM(s) (06-30-24 @ 10:35),  500 milliGRAM(s) (06-29-24 @ 10:01),  500 milliGRAM(s) (06-28-24 @ 10:12),  500 milliGRAM(s) (06-27-24 @ 12:44),  500 milliGRAM(s) (06-26-24 @ 11:18),  500 milliGRAM(s) (06-25-24 @ 11:09),  500 milliGRAM(s) (06-24-24 @ 10:02),  500 milliGRAM(s) (06-23-24 @ 10:11),  500 milliGRAM(s) (06-22-24 @ 10:02),  500 milliGRAM(s) (06-21-24 @ 11:20),  500 milliGRAM(s) (06-20-24 @ 10:17),  500 milliGRAM(s) (06-19-24 @ 10:59)  betamethasone Injectable: 12 milliGRAM(s) (06-18-24 @ 17:30)  chlorhexidine 2% Cloths: 1 Application(s) (07-05-24 @ 06:58),  1 Application(s) (07-04-24 @ 05:39),  1 Application(s) (07-03-24 @ 05:02),  1 Application(s) (07-02-24 @ 05:29),  1 Application(s) (07-01-24 @ 05:29),  1 Application(s) (06-30-24 @ 06:03),  1 Application(s) (06-28-24 @ 21:00),  1 Application(s) (06-28-24 @ 06:10),  1 Application(s) (06-27-24 @ 12:44),  1 Application(s) (06-26-24 @ 06:27),  1 Application(s) (06-25-24 @ 11:08),  1 Application(s) (06-24-24 @ 06:03),  1 Application(s) (06-23-24 @ 06:16),  1 Application(s) (06-22-24 @ 06:43),  1 Application(s) (06-21-24 @ 11:18),  1 Application(s) (06-20-24 @ 10:16),  1 Application(s) (06-19-24 @ 10:59),  1 Application(s) (06-18-24 @ 11:49)  diphtheria/tetanus/pertussis (acellular) Vaccine (Adacel): 0.5 milliLiter(s) (06-18-24 @ 17:27)  ferrous    sulfate: 325 milliGRAM(s) (07-04-24 @ 10:40),  325 milliGRAM(s) (07-03-24 @ 10:59),  325 milliGRAM(s) (07-02-24 @ 12:04),  325 milliGRAM(s) (07-01-24 @ 10:31),  325 milliGRAM(s) (06-30-24 @ 10:35),  325 milliGRAM(s) (06-29-24 @ 10:00),  325 milliGRAM(s) (06-28-24 @ 10:11),  325 milliGRAM(s) (06-27-24 @ 12:44),  325 milliGRAM(s) (06-26-24 @ 11:18),  325 milliGRAM(s) (06-25-24 @ 11:09),  325 milliGRAM(s) (06-24-24 @ 10:02),  325 milliGRAM(s) (06-23-24 @ 10:11),  325 milliGRAM(s) (06-22-24 @ 10:02),  325 milliGRAM(s) (06-21-24 @ 11:21),  325 milliGRAM(s) (06-20-24 @ 10:17),  325 milliGRAM(s) (06-19-24 @ 10:59)  heparin   Injectable: 5000 Unit(s) (07-04-24 @ 21:02),  5000 Unit(s) (07-04-24 @ 10:41),  5000 Unit(s) (07-03-24 @ 21:22),  5000 Unit(s) (07-03-24 @ 10:59),  5000 Unit(s) (07-02-24 @ 21:57),  5000 Unit(s) (07-02-24 @ 12:05),  5000 Unit(s) (07-01-24 @ 21:02),  5000 Unit(s) (07-01-24 @ 10:32),  5000 Unit(s) (06-30-24 @ 21:21),  5000 Unit(s) (06-30-24 @ 10:51),  5000 Unit(s) (06-29-24 @ 22:23),  5000 Unit(s) (06-29-24 @ 10:00),  5000 Unit(s) (06-28-24 @ 21:07),  5000 Unit(s) (06-28-24 @ 10:12),  5000 Unit(s) (06-27-24 @ 22:23),  5000 Unit(s) (06-27-24 @ 12:44),  5000 Unit(s) (06-26-24 @ 22:19),  5000 Unit(s) (06-26-24 @ 11:18),  5000 Unit(s) (06-25-24 @ 22:00),  5000 Unit(s) (06-25-24 @ 11:10),  5000 Unit(s) (06-24-24 @ 21:03),  5000 Unit(s) (06-24-24 @ 10:03),  5000 Unit(s) (06-23-24 @ 21:31),  5000 Unit(s) (06-23-24 @ 10:11),  5000 Unit(s) (06-22-24 @ 22:13),  5000 Unit(s) (06-22-24 @ 10:01),  5000 Unit(s) (06-21-24 @ 21:45),  5000 Unit(s) (06-21-24 @ 11:20),  5000 Unit(s) (06-20-24 @ 21:04),  5000 Unit(s) (06-20-24 @ 10:17),  5000 Unit(s) (06-19-24 @ 22:01),  5000 Unit(s) (06-19-24 @ 10:31),  5000 Unit(s) (06-18-24 @ 22:15),  5000 Unit(s) (06-18-24 @ 11:47)  indomethacin: 25 milliGRAM(s) (06-18-24 @ 09:58),  25 milliGRAM(s) (06-18-24 @ 04:08)  lactated ringers.: 50 mL/Hr (06-18-24 @ 09:10)  magnesium sulfate Infusion: 50 mL/Hr (06-18-24 @ 05:00)  prenatal multivitamin: 1 Tablet(s) (07-04-24 @ 10:40),  1 Tablet(s) (07-03-24 @ 10:59),  1 Tablet(s) (07-02-24 @ 12:04),  1 Tablet(s) (07-01-24 @ 10:35),  1 Tablet(s) (06-30-24 @ 10:35),  1 Tablet(s) (06-29-24 @ 10:00),  1 Tablet(s) (06-28-24 @ 10:11),  1 Tablet(s) (06-27-24 @ 12:44),  1 Tablet(s) (06-26-24 @ 11:18),  1 Tablet(s) (06-25-24 @ 11:09),  1 Tablet(s) (06-24-24 @ 10:03),  1 Tablet(s) (06-23-24 @ 10:11),  1 Tablet(s) (06-22-24 @ 10:02),  1 Tablet(s) (06-21-24 @ 11:20),  1 Tablet(s) (06-20-24 @ 10:16),  1 Tablet(s) (06-19-24 @ 10:59)  valACYclovir: 500 milliGRAM(s) (07-04-24 @ 21:01),  500 milliGRAM(s) (07-04-24 @ 10:41),  500 milliGRAM(s) (07-03-24 @ 21:22),  500 milliGRAM(s) (07-03-24 @ 10:59),  500 milliGRAM(s) (07-02-24 @ 21:57),  500 milliGRAM(s) (07-02-24 @ 12:04),  500 milliGRAM(s) (07-01-24 @ 21:02),  500 milliGRAM(s) (07-01-24 @ 10:31),  500 milliGRAM(s) (06-30-24 @ 21:20),  500 milliGRAM(s) (06-30-24 @ 10:35),  500 milliGRAM(s) (06-29-24 @ 22:23),  500 milliGRAM(s) (06-29-24 @ 10:01),  500 milliGRAM(s) (06-28-24 @ 21:06),  500 milliGRAM(s) (06-28-24 @ 10:11),  500 milliGRAM(s) (06-27-24 @ 22:22),  500 milliGRAM(s) (06-27-24 @ 12:44),  500 milliGRAM(s) (06-26-24 @ 22:19),  500 milliGRAM(s) (06-26-24 @ 11:17),  500 milliGRAM(s) (06-25-24 @ 21:48),  500 milliGRAM(s) (06-25-24 @ 11:09),  500 milliGRAM(s) (06-24-24 @ 21:01),  500 milliGRAM(s) (06-24-24 @ 10:02),  500 milliGRAM(s) (06-23-24 @ 21:32),  500 milliGRAM(s) (06-23-24 @ 10:11),  500 milliGRAM(s) (06-22-24 @ 22:13),  500 milliGRAM(s) (06-22-24 @ 10:01),  500 milliGRAM(s) (06-21-24 @ 21:46),  500 milliGRAM(s) (06-21-24 @ 11:21),  500 milliGRAM(s) (06-20-24 @ 21:02),  500 milliGRAM(s) (06-20-24 @ 10:17),  500 milliGRAM(s) (06-19-24 @ 22:01),  500 milliGRAM(s) (06-19-24 @ 10:32),  500 milliGRAM(s) (06-18-24 @ 21:25),  500 milliGRAM(s) (06-18-24 @ 08:06)

## 2024-07-05 NOTE — PROGRESS NOTE ADULT - ASSESSMENT
A/P: 29yo  @33w2d transferred from [facility], admitted with PPROM @30wk5d on . Patient is clinically stable with no signs of infection or PTL.    #PPROM  - s/p Mg (-)  - s/p Indocin  - s/p betamethasone (-)  - Candidate for rescue BMZ on   - s/p ampicillin (-)  - s/p amoxicillin (-)   - Afebrile, no signs/symptoms of infection    #HSV  - No lesions on SSE  - Continue Valtrex 500mg BID for ppx    #Palpitations  - Outpatient TTE (): EF 65-70%, V ventricular systolic function hyperdynamic. No valvular disease.  - s/p Holter monitor  - Cardio OB recs: no issues from cardiac perspective expected for delivery/anesthesia    #Fetal wellbeing  - NST BID  - ATU (): vtx, post, JUAN 11.59, BPP 8/8  - ATU (): vtx, post, JUAN 3.45, MVP<2, BPP 6/8, EFW 1766g (58%), AC 63%    #Maternal wellbeing  - Contraception: undecided, but declines BS or IUD  - HSQ for DVT ppx  - PNV  - Fe, Vit C  - Regular diet    #Disposition  - Candidate for exp mgmt until delivery at 34-36w unless change in fetal or maternal status  - IOL scheduled 7/15 at 2p    Brenton Ohara, PGY-3

## 2024-07-05 NOTE — PROGRESS NOTE ADULT - ATTENDING COMMENTS
MFM ATTENDING    27yo P0 at 33w2d with PPROM.     denies vb/ctx, reports normal fetal movement. endorses ongoing small volume leakage of clear fluid.     s/p acs -  s/p mag -  s/p NICU consult   fetus AGA 1766g 58% on   s/p indocin  s/p latency abx    [] eligible for rescue ACS if change in status until 34 weeks    Continue valtrex  Continue current management, routine ap care.   Recommend goal for delivery at 34 weeks.   MOD: planned vaginal, with  reserved for routine obstetrical indications.     All questions answered to patients satisfaction. within normal limits

## 2024-07-05 NOTE — PROGRESS NOTE ADULT - NSPROGADDITIONALINFOA_GEN_ALL_CORE
29yo  @33w2d w/ PPROM at 30w5d s/p ACS (-) and latency abx (-) w/ no clinical signs or symptoms of infection/PTL. This morning with ***. Stable vital signs without tachycardia or hypotension. Last labs without leukocytosis. Overall reassuring fetal status with *** NST, ***. Other prenatal issues include HSV, palpitations with clearance from cardio obstetrics.    - IOL scheduled at 34w5d as per patient request after counseling regarding recommendation for earlier IOL.   - Candidate for rescue ACS up until 34w0d  - Continue Valtrex for HSV ppx - no prodromal symptoms at this time   - Candidate for vaginal delivery, cephalic on presentation  - NST BID, 2x weekly BPP - due today     Nerissa Hill, PGY-5 MFM Fellow  Seen and d/w  *** 29yo  @33w2d w/ PPROM at 30w5d s/p ACS (-) and latency abx (-) w/ no clinical signs or symptoms of infection/PTL. This morning with no complaints. Stable vital signs without tachycardia or hypotension. Last labs without leukocytosis. Overall reassuring fetal status with reactive NST. Other prenatal issues include HSV, palpitations with clearance from cardio obstetrics.    - IOL scheduled at 34w5d as per patient request after counseling regarding recommendation for earlier IOL.   - Candidate for rescue ACS up until 34w0d  - Continue Valtrex for HSV ppx - no prodromal symptoms at this time   - Candidate for vaginal delivery, cephalic on presentation  - NST BID, 2x weekly BPP - due today     Nerissa Hill, PGY-5 MFM Fellow  Seen and d/w Dr. Collins

## 2024-07-06 LAB
BASOPHILS # BLD AUTO: 0.07 K/UL — SIGNIFICANT CHANGE UP (ref 0–0.2)
BASOPHILS NFR BLD AUTO: 0.7 % — SIGNIFICANT CHANGE UP (ref 0–2)
BLD GP AB SCN SERPL QL: POSITIVE — SIGNIFICANT CHANGE UP
EOSINOPHIL # BLD AUTO: 0.09 K/UL — SIGNIFICANT CHANGE UP (ref 0–0.5)
EOSINOPHIL NFR BLD AUTO: 0.9 % — SIGNIFICANT CHANGE UP (ref 0–6)
HCT VFR BLD CALC: 30.2 % — LOW (ref 34.5–45)
HGB BLD-MCNC: 10.3 G/DL — LOW (ref 11.5–15.5)
IANC: 6.57 K/UL — SIGNIFICANT CHANGE UP (ref 1.8–7.4)
IMM GRANULOCYTES NFR BLD AUTO: 7.3 % — HIGH (ref 0–0.9)
LYMPHOCYTES # BLD AUTO: 1.82 K/UL — SIGNIFICANT CHANGE UP (ref 1–3.3)
LYMPHOCYTES # BLD AUTO: 18.2 % — SIGNIFICANT CHANGE UP (ref 13–44)
MCHC RBC-ENTMCNC: 29.2 PG — SIGNIFICANT CHANGE UP (ref 27–34)
MCHC RBC-ENTMCNC: 34.1 GM/DL — SIGNIFICANT CHANGE UP (ref 32–36)
MCV RBC AUTO: 85.6 FL — SIGNIFICANT CHANGE UP (ref 80–100)
MONOCYTES # BLD AUTO: 0.73 K/UL — SIGNIFICANT CHANGE UP (ref 0–0.9)
MONOCYTES NFR BLD AUTO: 7.3 % — SIGNIFICANT CHANGE UP (ref 2–14)
NEUTROPHILS # BLD AUTO: 6.57 K/UL — SIGNIFICANT CHANGE UP (ref 1.8–7.4)
NEUTROPHILS NFR BLD AUTO: 65.6 % — SIGNIFICANT CHANGE UP (ref 43–77)
NRBC # BLD: 0 /100 WBCS — SIGNIFICANT CHANGE UP (ref 0–0)
NRBC # FLD: 0 K/UL — SIGNIFICANT CHANGE UP (ref 0–0)
PLATELET # BLD AUTO: 204 K/UL — SIGNIFICANT CHANGE UP (ref 150–400)
RBC # BLD: 3.53 M/UL — LOW (ref 3.8–5.2)
RBC # FLD: 13.5 % — SIGNIFICANT CHANGE UP (ref 10.3–14.5)
WBC # BLD: 10.01 K/UL — SIGNIFICANT CHANGE UP (ref 3.8–10.5)
WBC # FLD AUTO: 10.01 K/UL — SIGNIFICANT CHANGE UP (ref 3.8–10.5)

## 2024-07-06 PROCEDURE — 86077 PHYS BLOOD BANK SERV XMATCH: CPT

## 2024-07-06 PROCEDURE — 99232 SBSQ HOSP IP/OBS MODERATE 35: CPT | Mod: GC

## 2024-07-06 RX ADMIN — VALACYCLOVIR HYDROCHLORIDE 500 MILLIGRAM(S): 500 TABLET, FILM COATED ORAL at 22:06

## 2024-07-06 RX ADMIN — HEPARIN SODIUM 5000 UNIT(S): 50 INJECTION, SOLUTION INTRAVENOUS at 22:04

## 2024-07-06 RX ADMIN — Medication 1 TABLET(S): at 10:15

## 2024-07-06 RX ADMIN — Medication 325 MILLIGRAM(S): at 10:15

## 2024-07-06 RX ADMIN — VALACYCLOVIR HYDROCHLORIDE 500 MILLIGRAM(S): 500 TABLET, FILM COATED ORAL at 10:14

## 2024-07-06 RX ADMIN — HEPARIN SODIUM 5000 UNIT(S): 50 INJECTION, SOLUTION INTRAVENOUS at 10:15

## 2024-07-06 RX ADMIN — Medication 1 APPLICATION(S): at 10:15

## 2024-07-06 RX ADMIN — Medication 500 MILLIGRAM(S): at 10:14

## 2024-07-06 NOTE — PROGRESS NOTE ADULT - ASSESSMENT
A/P: 29yo  @33w2d transferred from [facility], admitted with PPROM @30wk5d on . Patient is clinically stable with no signs of infection or PTL.    #PPROM  - s/p Mg (-)  - s/p Indocin  - s/p betamethasone (-)  - Candidate for rescue BMZ on   - s/p ampicillin (-)  - s/p amoxicillin (-)   - Afebrile, no signs/symptoms of infection    #HSV  - No lesions on SSE  - Continue Valtrex 500mg BID for ppx    #Palpitations  - Outpatient TTE (): EF 65-70%, V ventricular systolic function hyperdynamic. No valvular disease.  - s/p Holter monitor  - Cardio OB recs: no issues from cardiac perspective expected for delivery/anesthesia    #Fetal wellbeing  - NST BID  - ATU (): vtx, post, JUAN 11.59, BPP 8/8  - ATU (): vtx, post, JUAN 3.45, MVP<2, BPP 6/8, EFW 1766g (58%), AC 63%  - GBS+    #Maternal wellbeing  - Contraception: undecided, but declines BS or IUD  - HSQ for DVT ppx  - PNV  - Fe, Vit C  - Regular diet    #Disposition  - Candidate for exp mgmt until delivery at 34-36w unless change in fetal or maternal status  - IOL scheduled 7/15 at 2p    Rosa Dennison PGY3 A/P: 29yo  @33w3d transferred from [facility], admitted with PPROM @30wk5d on . Patient is clinically stable with no signs of infection or PTL.    #PPROM  - s/p Mg (-)  - s/p Indocin  - s/p betamethasone (-)  - Candidate for rescue BMZ on   - s/p ampicillin (-)  - s/p amoxicillin (-)   - Afebrile, no signs/symptoms of infection    #HSV  - No lesions on SSE  - Continue Valtrex 500mg BID for ppx    #Palpitations  - Outpatient TTE (): EF 65-70%, V ventricular systolic function hyperdynamic. No valvular disease.  - s/p Holter monitor  - Cardio OB recs: no issues from cardiac perspective expected for delivery/anesthesia    #Fetal wellbeing  - NST BID  - ATU (): vtx, post, JUAN 11.59, BPP 8/8  - ATU (): vtx, post, JUAN 3.45, MVP<2, BPP 6/8, EFW 1766g (58%), AC 63%  - GBS+    #Maternal wellbeing  - Contraception: undecided, but declines BS or IUD  - HSQ for DVT ppx  - PNV  - Fe, Vit C  - Regular diet    #Disposition  - Candidate for exp mgmt until delivery at 34-36w unless change in fetal or maternal status  - IOL scheduled 7/15 at 2p    Rosa Dennison PGY3

## 2024-07-06 NOTE — PROGRESS NOTE ADULT - SUBJECTIVE AND OBJECTIVE BOX
PGY 3 Antepartum Note    Patient seen and examined at bedside in [facility]. She reports small trickles of clear fluid. Reports good fetal movement. She denies VB, ctx, CP, SOB, N/V, fevers, and chills.    Vital Signs Last 24 Hrs  T(C): 36.9 (05 Jul 2024 21:30), Max: 37.2 (05 Jul 2024 13:42)  T(F): 98.4 (05 Jul 2024 21:30), Max: 99 (05 Jul 2024 13:42)  HR: 88 (05 Jul 2024 21:30) (87 - 98)  BP: 111/66 (05 Jul 2024 21:30) (102/59 - 122/66)  BP(mean): --  ABP: --  ABP(mean): --  RR: 16 (05 Jul 2024 21:30) (14 - 17)  SpO2: 96% (05 Jul 2024 21:30) (93% - 98%)    O2 Parameters below as of 05 Jul 2024 21:30  Patient On (Oxygen Delivery Method): room air            Gen: NAD  Cardio: rrr, s1/s2  Pulm: ca b/l  Abd: gravid, nontender, no palpable contractions  Ext: no edema,  no calf enderness  SVE: deferred    Medications:  (ADM OVERRIDE): 1 Each (06-18-24 @ 03:50)  (ADM OVERRIDE): 1 Each (06-18-24 @ 03:50)  amoxicillin: 500 milliGRAM(s) (06-24-24 @ 21:01),  500 milliGRAM(s) (06-24-24 @ 13:44),  500 milliGRAM(s) (06-24-24 @ 06:03),  500 milliGRAM(s) (06-23-24 @ 21:31),  500 milliGRAM(s) (06-23-24 @ 13:43),  500 milliGRAM(s) (06-23-24 @ 06:16),  500 milliGRAM(s) (06-22-24 @ 22:13),  500 milliGRAM(s) (06-22-24 @ 13:36),  500 milliGRAM(s) (06-22-24 @ 06:43),  500 milliGRAM(s) (06-21-24 @ 21:45),  500 milliGRAM(s) (06-21-24 @ 14:53),  500 milliGRAM(s) (06-21-24 @ 05:12),  500 milliGRAM(s) (06-20-24 @ 21:03),  500 milliGRAM(s) (06-20-24 @ 15:08),  500 milliGRAM(s) (06-20-24 @ 06:05)  ampicillin  IVPB: 200 mL/Hr (06-18-24 @ 04:05)  ampicillin  IVPB: 200 mL/Hr (06-19-24 @ 22:04),  200 mL/Hr (06-19-24 @ 17:17),  200 mL/Hr (06-19-24 @ 10:31),  200 mL/Hr (06-19-24 @ 03:49),  200 mL/Hr (06-18-24 @ 22:13),  200 mL/Hr (06-18-24 @ 17:33),  200 mL/Hr (06-18-24 @ 09:55)  ascorbic acid: 500 milliGRAM(s) (07-05-24 @ 10:51),  500 milliGRAM(s) (07-04-24 @ 10:40),  500 milliGRAM(s) (07-03-24 @ 10:59),  500 milliGRAM(s) (07-02-24 @ 12:05),  500 milliGRAM(s) (07-01-24 @ 10:32),  500 milliGRAM(s) (06-30-24 @ 10:35),  500 milliGRAM(s) (06-29-24 @ 10:01),  500 milliGRAM(s) (06-28-24 @ 10:12),  500 milliGRAM(s) (06-27-24 @ 12:44),  500 milliGRAM(s) (06-26-24 @ 11:18),  500 milliGRAM(s) (06-25-24 @ 11:09),  500 milliGRAM(s) (06-24-24 @ 10:02),  500 milliGRAM(s) (06-23-24 @ 10:11),  500 milliGRAM(s) (06-22-24 @ 10:02),  500 milliGRAM(s) (06-21-24 @ 11:20),  500 milliGRAM(s) (06-20-24 @ 10:17),  500 milliGRAM(s) (06-19-24 @ 10:59)  betamethasone Injectable: 12 milliGRAM(s) (06-18-24 @ 17:30)  chlorhexidine 2% Cloths: 1 Application(s) (07-05-24 @ 06:58),  1 Application(s) (07-04-24 @ 05:39),  1 Application(s) (07-03-24 @ 05:02),  1 Application(s) (07-02-24 @ 05:29),  1 Application(s) (07-01-24 @ 05:29),  1 Application(s) (06-30-24 @ 06:03),  1 Application(s) (06-28-24 @ 21:00),  1 Application(s) (06-28-24 @ 06:10),  1 Application(s) (06-27-24 @ 12:44),  1 Application(s) (06-26-24 @ 06:27),  1 Application(s) (06-25-24 @ 11:08),  1 Application(s) (06-24-24 @ 06:03),  1 Application(s) (06-23-24 @ 06:16),  1 Application(s) (06-22-24 @ 06:43),  1 Application(s) (06-21-24 @ 11:18),  1 Application(s) (06-20-24 @ 10:16),  1 Application(s) (06-19-24 @ 10:59),  1 Application(s) (06-18-24 @ 11:49)  diphtheria/tetanus/pertussis (acellular) Vaccine (Adacel): 0.5 milliLiter(s) (06-18-24 @ 17:27)  ferrous    sulfate: 325 milliGRAM(s) (07-05-24 @ 10:51),  325 milliGRAM(s) (07-04-24 @ 10:40),  325 milliGRAM(s) (07-03-24 @ 10:59),  325 milliGRAM(s) (07-02-24 @ 12:04),  325 milliGRAM(s) (07-01-24 @ 10:31),  325 milliGRAM(s) (06-30-24 @ 10:35),  325 milliGRAM(s) (06-29-24 @ 10:00),  325 milliGRAM(s) (06-28-24 @ 10:11),  325 milliGRAM(s) (06-27-24 @ 12:44),  325 milliGRAM(s) (06-26-24 @ 11:18),  325 milliGRAM(s) (06-25-24 @ 11:09),  325 milliGRAM(s) (06-24-24 @ 10:02),  325 milliGRAM(s) (06-23-24 @ 10:11),  325 milliGRAM(s) (06-22-24 @ 10:02),  325 milliGRAM(s) (06-21-24 @ 11:21),  325 milliGRAM(s) (06-20-24 @ 10:17),  325 milliGRAM(s) (06-19-24 @ 10:59)  heparin   Injectable: 5000 Unit(s) (07-05-24 @ 21:34),  5000 Unit(s) (07-05-24 @ 10:50),  5000 Unit(s) (07-04-24 @ 21:02),  5000 Unit(s) (07-04-24 @ 10:41),  5000 Unit(s) (07-03-24 @ 21:22),  5000 Unit(s) (07-03-24 @ 10:59),  5000 Unit(s) (07-02-24 @ 21:57),  5000 Unit(s) (07-02-24 @ 12:05),  5000 Unit(s) (07-01-24 @ 21:02),  5000 Unit(s) (07-01-24 @ 10:32),  5000 Unit(s) (06-30-24 @ 21:21),  5000 Unit(s) (06-30-24 @ 10:51),  5000 Unit(s) (06-29-24 @ 22:23),  5000 Unit(s) (06-29-24 @ 10:00),  5000 Unit(s) (06-28-24 @ 21:07),  5000 Unit(s) (06-28-24 @ 10:12),  5000 Unit(s) (06-27-24 @ 22:23),  5000 Unit(s) (06-27-24 @ 12:44),  5000 Unit(s) (06-26-24 @ 22:19),  5000 Unit(s) (06-26-24 @ 11:18),  5000 Unit(s) (06-25-24 @ 22:00),  5000 Unit(s) (06-25-24 @ 11:10),  5000 Unit(s) (06-24-24 @ 21:03),  5000 Unit(s) (06-24-24 @ 10:03),  5000 Unit(s) (06-23-24 @ 21:31),  5000 Unit(s) (06-23-24 @ 10:11),  5000 Unit(s) (06-22-24 @ 22:13),  5000 Unit(s) (06-22-24 @ 10:01),  5000 Unit(s) (06-21-24 @ 21:45),  5000 Unit(s) (06-21-24 @ 11:20),  5000 Unit(s) (06-20-24 @ 21:04),  5000 Unit(s) (06-20-24 @ 10:17),  5000 Unit(s) (06-19-24 @ 22:01),  5000 Unit(s) (06-19-24 @ 10:31),  5000 Unit(s) (06-18-24 @ 22:15),  5000 Unit(s) (06-18-24 @ 11:47)  indomethacin: 25 milliGRAM(s) (06-18-24 @ 09:58),  25 milliGRAM(s) (06-18-24 @ 04:08)  lactated ringers.: 50 mL/Hr (06-18-24 @ 09:10)  magnesium sulfate Infusion: 50 mL/Hr (06-18-24 @ 05:00)  prenatal multivitamin: 1 Tablet(s) (07-05-24 @ 10:50),  1 Tablet(s) (07-04-24 @ 10:40),  1 Tablet(s) (07-03-24 @ 10:59),  1 Tablet(s) (07-02-24 @ 12:04),  1 Tablet(s) (07-01-24 @ 10:35),  1 Tablet(s) (06-30-24 @ 10:35),  1 Tablet(s) (06-29-24 @ 10:00),  1 Tablet(s) (06-28-24 @ 10:11),  1 Tablet(s) (06-27-24 @ 12:44),  1 Tablet(s) (06-26-24 @ 11:18),  1 Tablet(s) (06-25-24 @ 11:09),  1 Tablet(s) (06-24-24 @ 10:03),  1 Tablet(s) (06-23-24 @ 10:11),  1 Tablet(s) (06-22-24 @ 10:02),  1 Tablet(s) (06-21-24 @ 11:20),  1 Tablet(s) (06-20-24 @ 10:16),  1 Tablet(s) (06-19-24 @ 10:59)  valACYclovir: 500 milliGRAM(s) (07-05-24 @ 21:34),  500 milliGRAM(s) (07-05-24 @ 10:50),  500 milliGRAM(s) (07-04-24 @ 21:01),  500 milliGRAM(s) (07-04-24 @ 10:41),  500 milliGRAM(s) (07-03-24 @ 21:22),  500 milliGRAM(s) (07-03-24 @ 10:59),  500 milliGRAM(s) (07-02-24 @ 21:57),  500 milliGRAM(s) (07-02-24 @ 12:04),  500 milliGRAM(s) (07-01-24 @ 21:02),  500 milliGRAM(s) (07-01-24 @ 10:31),  500 milliGRAM(s) (06-30-24 @ 21:20),  500 milliGRAM(s) (06-30-24 @ 10:35),  500 milliGRAM(s) (06-29-24 @ 22:23),  500 milliGRAM(s) (06-29-24 @ 10:01),  500 milliGRAM(s) (06-28-24 @ 21:06),  500 milliGRAM(s) (06-28-24 @ 10:11),  500 milliGRAM(s) (06-27-24 @ 22:22),  500 milliGRAM(s) (06-27-24 @ 12:44),  500 milliGRAM(s) (06-26-24 @ 22:19),  500 milliGRAM(s) (06-26-24 @ 11:17),  500 milliGRAM(s) (06-25-24 @ 21:48),  500 milliGRAM(s) (06-25-24 @ 11:09),  500 milliGRAM(s) (06-24-24 @ 21:01),  500 milliGRAM(s) (06-24-24 @ 10:02),  500 milliGRAM(s) (06-23-24 @ 21:32),  500 milliGRAM(s) (06-23-24 @ 10:11),  500 milliGRAM(s) (06-22-24 @ 22:13),  500 milliGRAM(s) (06-22-24 @ 10:01),  500 milliGRAM(s) (06-21-24 @ 21:46),  500 milliGRAM(s) (06-21-24 @ 11:21),  500 milliGRAM(s) (06-20-24 @ 21:02),  500 milliGRAM(s) (06-20-24 @ 10:17),  500 milliGRAM(s) (06-19-24 @ 22:01),  500 milliGRAM(s) (06-19-24 @ 10:32),  500 milliGRAM(s) (06-18-24 @ 21:25),  500 milliGRAM(s) (06-18-24 @ 08:06)      Labs:

## 2024-07-06 NOTE — PROGRESS NOTE ADULT - ATTENDING COMMENTS
MFM Attending     I have personally seen and counseled this patient. No signs infection or labor, reactive NST  Requires hospitalization until delivery     The patient indicated she understands and agrees with the plan of care.

## 2024-07-07 LAB
BASOPHILS # BLD AUTO: 0.05 K/UL — SIGNIFICANT CHANGE UP (ref 0–0.2)
BASOPHILS NFR BLD AUTO: 0.5 % — SIGNIFICANT CHANGE UP (ref 0–2)
EOSINOPHIL # BLD AUTO: 0.07 K/UL — SIGNIFICANT CHANGE UP (ref 0–0.5)
EOSINOPHIL NFR BLD AUTO: 0.7 % — SIGNIFICANT CHANGE UP (ref 0–6)
HCT VFR BLD CALC: 31.2 % — LOW (ref 34.5–45)
HGB BLD-MCNC: 10.7 G/DL — LOW (ref 11.5–15.5)
IANC: 6.46 K/UL — SIGNIFICANT CHANGE UP (ref 1.8–7.4)
IMM GRANULOCYTES NFR BLD AUTO: 5.7 % — HIGH (ref 0–0.9)
LYMPHOCYTES # BLD AUTO: 1.69 K/UL — SIGNIFICANT CHANGE UP (ref 1–3.3)
LYMPHOCYTES # BLD AUTO: 17.9 % — SIGNIFICANT CHANGE UP (ref 13–44)
MCHC RBC-ENTMCNC: 29.1 PG — SIGNIFICANT CHANGE UP (ref 27–34)
MCHC RBC-ENTMCNC: 34.3 GM/DL — SIGNIFICANT CHANGE UP (ref 32–36)
MCV RBC AUTO: 84.8 FL — SIGNIFICANT CHANGE UP (ref 80–100)
MONOCYTES # BLD AUTO: 0.62 K/UL — SIGNIFICANT CHANGE UP (ref 0–0.9)
MONOCYTES NFR BLD AUTO: 6.6 % — SIGNIFICANT CHANGE UP (ref 2–14)
NEUTROPHILS # BLD AUTO: 6.46 K/UL — SIGNIFICANT CHANGE UP (ref 1.8–7.4)
NEUTROPHILS NFR BLD AUTO: 68.6 % — SIGNIFICANT CHANGE UP (ref 43–77)
NRBC # BLD: 0 /100 WBCS — SIGNIFICANT CHANGE UP (ref 0–0)
NRBC # FLD: 0 K/UL — SIGNIFICANT CHANGE UP (ref 0–0)
PLATELET # BLD AUTO: 188 K/UL — SIGNIFICANT CHANGE UP (ref 150–400)
RBC # BLD: 3.68 M/UL — LOW (ref 3.8–5.2)
RBC # FLD: 13.4 % — SIGNIFICANT CHANGE UP (ref 10.3–14.5)
WBC # BLD: 9.43 K/UL — SIGNIFICANT CHANGE UP (ref 3.8–10.5)
WBC # FLD AUTO: 9.43 K/UL — SIGNIFICANT CHANGE UP (ref 3.8–10.5)

## 2024-07-07 PROCEDURE — 99232 SBSQ HOSP IP/OBS MODERATE 35: CPT | Mod: GC

## 2024-07-07 RX ORDER — DEXTROSE MONOHYDRATE AND SODIUM CHLORIDE 5; .3 G/100ML; G/100ML
500 INJECTION, SOLUTION INTRAVENOUS ONCE
Refills: 0 | Status: COMPLETED | OUTPATIENT
Start: 2024-07-07 | End: 2024-07-07

## 2024-07-07 RX ADMIN — Medication 1 TABLET(S): at 11:08

## 2024-07-07 RX ADMIN — Medication 325 MILLIGRAM(S): at 11:08

## 2024-07-07 RX ADMIN — HEPARIN SODIUM 5000 UNIT(S): 50 INJECTION, SOLUTION INTRAVENOUS at 22:09

## 2024-07-07 RX ADMIN — DEXTROSE MONOHYDRATE AND SODIUM CHLORIDE 500 MILLILITER(S): 5; .3 INJECTION, SOLUTION INTRAVENOUS at 10:16

## 2024-07-07 RX ADMIN — VALACYCLOVIR HYDROCHLORIDE 500 MILLIGRAM(S): 500 TABLET, FILM COATED ORAL at 11:08

## 2024-07-07 RX ADMIN — Medication 1 APPLICATION(S): at 11:16

## 2024-07-07 RX ADMIN — VALACYCLOVIR HYDROCHLORIDE 500 MILLIGRAM(S): 500 TABLET, FILM COATED ORAL at 22:09

## 2024-07-07 RX ADMIN — Medication 500 MILLIGRAM(S): at 11:08

## 2024-07-07 RX ADMIN — HEPARIN SODIUM 5000 UNIT(S): 50 INJECTION, SOLUTION INTRAVENOUS at 11:08

## 2024-07-07 NOTE — PROGRESS NOTE ADULT - SUBJECTIVE AND OBJECTIVE BOX
R3 Antepartum Note    **INCOMPLETE NOTE***    Patient seen and examined at bedside, no acute overnight events. No acute complaints.  Endorses small trickles of clear fluid.   Pt reports +FM, denies VB, ctx, HA, epigastric pain, blurred vision, CP, SOB, N/V, fevers, and chills.    Vital Signs Last 24 Hours  T(C): 36.9 (07-06-24 @ 21:07), Max: 37 (07-06-24 @ 14:12)  HR: 76 (07-06-24 @ 21:07) (76 - 100)  BP: 111/61 (07-06-24 @ 21:07) (107/57 - 120/63)  RR: 16 (07-06-24 @ 21:07) (16 - 18)  SpO2: 98% (07-06-24 @ 21:07) (97% - 99%)    CAPILLARY BLOOD GLUCOSE          Physical Exam:  General: NAD  Abdomen: Soft, non-tender, gravid  Ext: No pain or swelling    Labs:             10.3   10.01 )-----------( 204      ( 07-06 @ 06:42 )             30.2                   MEDICATIONS  (STANDING):  ascorbic acid 500 milliGRAM(s) Oral daily  chlorhexidine 2% Cloths 1 Application(s) Topical <User Schedule>  ferrous    sulfate 325 milliGRAM(s) Oral daily  heparin   Injectable 5000 Unit(s) SubCutaneous every 12 hours  prenatal multivitamin 1 Tablet(s) Oral daily  valACYclovir 500 milliGRAM(s) Oral two times a day    MEDICATIONS  (PRN):   R3 Antepartum Note    Patient seen and examined at bedside, no acute overnight events. No acute complaints.  Endorses small trickles of clear fluid.   Pt reports +FM, denies VB, ctx, HA, epigastric pain, blurred vision, CP, SOB, N/V, fevers, and chills.    Vital Signs Last 24 Hours  T(C): 36.9 (07-06-24 @ 21:07), Max: 37 (07-06-24 @ 14:12)  HR: 76 (07-06-24 @ 21:07) (76 - 100)  BP: 111/61 (07-06-24 @ 21:07) (107/57 - 120/63)  RR: 16 (07-06-24 @ 21:07) (16 - 18)  SpO2: 98% (07-06-24 @ 21:07) (97% - 99%)    CAPILLARY BLOOD GLUCOSE          Physical Exam:  General: NAD  Abdomen: Soft, non-tender, gravid  Ext: No pain or swelling    Labs:             10.3   10.01 )-----------( 204      ( 07-06 @ 06:42 )             30.2                   MEDICATIONS  (STANDING):  ascorbic acid 500 milliGRAM(s) Oral daily  chlorhexidine 2% Cloths 1 Application(s) Topical <User Schedule>  ferrous    sulfate 325 milliGRAM(s) Oral daily  heparin   Injectable 5000 Unit(s) SubCutaneous every 12 hours  prenatal multivitamin 1 Tablet(s) Oral daily  valACYclovir 500 milliGRAM(s) Oral two times a day    MEDICATIONS  (PRN):

## 2024-07-07 NOTE — PROGRESS NOTE ADULT - ATTENDING COMMENTS
continue expectant management until at least 34 weeks- unless clinical indications for early delivery arise

## 2024-07-07 NOTE — PROGRESS NOTE ADULT - NSPROGADDITIONALINFOA_GEN_ALL_CORE
MFM FELLOW ADDENDUM:      29yo  at 33w4d admitted with PPROM (since 30w5d).  Denies VB/ctx/abd pain/DFM/fevers.  VS wnl, afebrile, no fundal tenderness, WBC 9 today.  EFM initially with ?fetal tachycardia, s/p 500cc bolus and prolonged monitoring baseline 150 with moderate variability and accelerations; fetal status is reassuring and likely prolonged accelerations at beginning of monitoring.  Given entire clinical picture, there is low concern for infection at this time. Also low concern for labor/abruption. She is s/p betamethasone for fetal lung maturity (-) and latency abx (-). Fetus AGA () EFW 58%ile, cephalic. S/p NICU consult.  Continue NST BID and BPP twice weekly. Continuing valtrex for HSV prophylaxis.   MOD: per OB indications (no current contraindication for vaginal delivery)   TOD: IOL scheduled at 34w5d per pt request as earliest  availability; however, she is aware and OK with earlier induction if medically indicated.     Seen with Dr. Fuentes Vaughan MD PGY-6

## 2024-07-07 NOTE — PROGRESS NOTE ADULT - ASSESSMENT
A/P: 27yo  @33w4d transferred from [facility], admitted with PPROM @30wk5d on . Patient is clinically stable with no signs of infection or PTL.    #PPROM  - s/p Mg (-)  - s/p Indocin  - s/p betamethasone (-)  - Candidate for rescue BMZ on   - s/p ampicillin (-)  - s/p amoxicillin (-)   - Afebrile, no signs/symptoms of infection    #HSV  - No lesions on SSE  - Continue Valtrex 500mg BID for ppx    #Palpitations  - Outpatient TTE (): EF 65-70%, V ventricular systolic function hyperdynamic. No valvular disease.  - s/p Holter monitor  - Cardio OB recs: no issues from cardiac perspective expected for delivery/anesthesia    #Fetal wellbeing  - NST BID  - ATU (): vtx, post, JUAN 11.59, BPP 8/8  - ATU (): vtx, post, JUAN 3.45, MVP<2, BPP 6/8, EFW 1766g (58%), AC 63%  - GBS+    #Maternal wellbeing  - Contraception: undecided, but declines BS or IUD  - HSQ for DVT ppx  - PNV  - Fe, Vit C  - Regular diet    #Disposition  - Candidate for exp mgmt until delivery at 34-36w unless change in fetal or maternal status  - IOL scheduled 7/15 at 2p    INCOMPLETE VADIM Martínez PGY3   A/P: 29yo  @33w4d transferred from [facility], admitted with PPROM @30wk5d on . Patient is clinically stable with no signs of infection or PTL.    #PPROM  - s/p Mg (-)  - s/p Indocin  - s/p betamethasone (-)  - Candidate for rescue BMZ on   - s/p ampicillin (-)  - s/p amoxicillin (-)   - Afebrile, no signs/symptoms of infection    #HSV  - No lesions on SSE  - Continue Valtrex 500mg BID for ppx    #Palpitations  - Outpatient TTE (): EF 65-70%, V ventricular systolic function hyperdynamic. No valvular disease.  - s/p Holter monitor  - Cardio OB recs: no issues from cardiac perspective expected for delivery/anesthesia    #Fetal wellbeing  - NST BID  - ATU (): vtx, post, JUAN 11.59, BPP 8/8  - ATU (): vtx, post, JUAN 3.45, MVP<2, BPP 6/8, EFW 1766g (58%), AC 63%  - GBS+    #Maternal wellbeing  - Contraception: undecided, but declines BS or IUD  - HSQ for DVT ppx  - PNV  - Fe, Vit C  - Regular diet    #Disposition  - Candidate for exp mgmt until delivery at 34-36w unless change in fetal or maternal status  - IOL scheduled 7/15 at 2p    KRUPA Martínez PGY3

## 2024-07-08 PROCEDURE — 99233 SBSQ HOSP IP/OBS HIGH 50: CPT | Mod: 25

## 2024-07-08 RX ADMIN — VALACYCLOVIR HYDROCHLORIDE 500 MILLIGRAM(S): 500 TABLET, FILM COATED ORAL at 11:16

## 2024-07-08 RX ADMIN — Medication 1 TABLET(S): at 11:16

## 2024-07-08 RX ADMIN — Medication 500 MILLIGRAM(S): at 11:16

## 2024-07-08 RX ADMIN — HEPARIN SODIUM 5000 UNIT(S): 50 INJECTION, SOLUTION INTRAVENOUS at 21:45

## 2024-07-08 RX ADMIN — HEPARIN SODIUM 5000 UNIT(S): 50 INJECTION, SOLUTION INTRAVENOUS at 11:17

## 2024-07-08 RX ADMIN — Medication 1 APPLICATION(S): at 11:18

## 2024-07-08 RX ADMIN — VALACYCLOVIR HYDROCHLORIDE 500 MILLIGRAM(S): 500 TABLET, FILM COATED ORAL at 21:45

## 2024-07-08 RX ADMIN — Medication 325 MILLIGRAM(S): at 11:17

## 2024-07-08 NOTE — PROGRESS NOTE ADULT - ASSESSMENT
A/P: 29yo  @33w5d transferred from [facility], admitted with PPROM @30wk5d on . Patient is clinically stable with no signs of infection or PTL.    #PPROM  - s/p Mg (-)  - s/p Indocin  - s/p betamethasone (-)  - Candidate for rescue BMZ on   - s/p ampicillin (-)  - s/p amoxicillin (-)   - Afebrile, no signs/symptoms of infection    #HSV  - No lesions on SSE  - Continue Valtrex 500mg BID for ppx    #Palpitations  - Outpatient TTE (): EF 65-70%, V ventricular systolic function hyperdynamic. No valvular disease.  - s/p Holter monitor  - Cardio OB recs: no issues from cardiac perspective expected for delivery/anesthesia    #Fetal wellbeing  - NST BID  - ATU (): vtx, post, JUAN 11.59, BPP 8/8  - ATU (): vtx, post, JUAN 3.45, MVP<2, BPP 6/8, EFW 1766g (58%), AC 63%  - GBS+    #Maternal wellbeing  - Contraception: undecided, but declines BS or IUD  - HSQ for DVT ppx  - PNV  - Fe, Vit C  - Regular diet    #Disposition  - Candidate for exp mgmt until delivery at 34-36w unless change in fetal or maternal status  - IOL scheduled 7/15 at 2p (34w5d)    Brenton Ohara, PGY-3

## 2024-07-08 NOTE — PROGRESS NOTE ADULT - ATTENDING COMMENTS
MFM Attending Note  Patient seen at bedside.  Patient reports normal fetal movement and denies symptoms of labor. Patient reports clear leakage of fluid.    ICU Vital Signs Last 24 Hrs  T(C): 36.8 (2024 09:23), Max: 37 (2024 21:15)  T(F): 98.3 (2024 09:23), Max: 98.6 (2024 21:15)  HR: 96 (2024 09:23) (84 - 96)  BP: 118/63 (2024 09:23) (101/58 - 126/64)  BP(mean): --  ABP: --  ABP(mean): --  RR: 16 (2024 09:23) (16 - 18)  SpO2: 97% (2024 09:23) (94% - 99%)    O2 Parameters below as of 2024 09:23  Patient On (Oxygen Delivery Method): room air    abdomen: soft, nontender, gravid  extremities: no calf tenderness    EFM: 150, moderate variability, - accel, -decel  toco: quiet                        10.7   9.43  )-----------( 188      ( 2024 10:00 )             31.2     a/p:  28 y.o.  at 33w5d  # PPROM  # Primigravid  # GBS bacteruria  # HSV, asymptomatic  # Short cervix, s/p vaginal progesterone  # Palpitations, currently resolved    Patient s/p betamethasone -, s/p latency antibiotics, GBS positive for treatment in labor; no signs of chorioamnionitis  Twice weekly ultrasounds, next tomorrow  Continue NST BID  Valtrex continued for suppression, asymptomatic  Palpitation: s/p cardiology evaluation with normal echo    Continued in hospital care secondary to PPROM, Planning delivery at 34 weeks and candidate for expectant management until then if maternal/fetal status remain reassuring

## 2024-07-08 NOTE — PROGRESS NOTE ADULT - NSPROGADDITIONALINFOA_GEN_ALL_CORE
29yo  @33w5d w/ PPROM at 30w5d s/p ACS (-) and latency abx (-) w/ no clinical signs or symptoms of infection/PTL. This morning with no complaints. Stable vital signs with intermittent tachycardia but no fevers. No fundal tenderness. Over the weekend with episode of fetal tachycardia, likely within acceleration. This AM ***    Other prenatal issues include HSV, palpitations with clearance from cardio obstetrics.    - Continue Valtrex for HSV ppx - no prodromal symptoms at this time   - Candidate for vaginal delivery, cephalic on last scan  - NST BID, 2x weekly BPP  - IOL scheduled at 34w5d as per patient request after counseling regarding recommendation for earlier IOL and patient amenable for earlier induction if medically indicated.     Nerissa Hill MD PGY-5 27yo  @33w5d w/ PPROM at 30w5d s/p ACS (-) and latency abx (-) w/ no clinical signs or symptoms of infection/PTL. This morning with no complaints, stable vitals with intermittent tachycardia, afebrile, no fundal tenderness. Pending NST this AM for fetal status.    Other prenatal issues include HSV, palpitations with clearance from cardio obstetrics, Rh negative s/p Rhogam     - Continue Valtrex for HSV ppx - no prodromal symptoms at this time   - Defer rescue ACS at this time  - NST BID, 2x weekly BPP - next scan tomorrow   - IOL scheduled at 34w5d as per patient request after counseling regarding recommendation for earlier IOL and patient amenable for earlier induction if medically indicated. Candidate for vaginal delivery, cephalic on last scan    Nerissa Hill MD PGY-5  Seen and d/w Dr. Baum

## 2024-07-08 NOTE — PROGRESS NOTE ADULT - SUBJECTIVE AND OBJECTIVE BOX
PGY3 Antepartum Note    Patient seen and examined at bedside, no acute overnight events. No acute complaints. Endorses small trickles of clear fluid.   Pt reports +FM, denies VB, ctx, HA, epigastric pain, blurred vision, CP, SOB, N/V, fevers, and chills.    T(F): 98.5 (05:20)  HR: 88 (05:20)  BP: 101/58 (05:20)  RR: 17 (05:20)    Gen: NAD  Cardio: rrr, s1/s2  Pulm: ca b/l  Abd: gravid, nontender, no palpable contractions  Ext: no edema,  no calf tenderness  SVE: deferred    Medications:  (ADM OVERRIDE): 1 Each (06-18-24 @ 03:50)  (ADM OVERRIDE): 1 Each (06-18-24 @ 03:50)  amoxicillin: 500 milliGRAM(s) (06-24-24 @ 21:01),  500 milliGRAM(s) (06-24-24 @ 13:44),  500 milliGRAM(s) (06-24-24 @ 06:03),  500 milliGRAM(s) (06-23-24 @ 21:31),  500 milliGRAM(s) (06-23-24 @ 13:43),  500 milliGRAM(s) (06-23-24 @ 06:16),  500 milliGRAM(s) (06-22-24 @ 22:13),  500 milliGRAM(s) (06-22-24 @ 13:36),  500 milliGRAM(s) (06-22-24 @ 06:43),  500 milliGRAM(s) (06-21-24 @ 21:45),  500 milliGRAM(s) (06-21-24 @ 14:53),  500 milliGRAM(s) (06-21-24 @ 05:12),  500 milliGRAM(s) (06-20-24 @ 21:03),  500 milliGRAM(s) (06-20-24 @ 15:08),  500 milliGRAM(s) (06-20-24 @ 06:05)  ampicillin  IVPB: 200 mL/Hr (06-19-24 @ 22:04),  200 mL/Hr (06-19-24 @ 17:17),  200 mL/Hr (06-19-24 @ 10:31),  200 mL/Hr (06-19-24 @ 03:49),  200 mL/Hr (06-18-24 @ 22:13),  200 mL/Hr (06-18-24 @ 17:33),  200 mL/Hr (06-18-24 @ 09:55)  ampicillin  IVPB: 200 mL/Hr (06-18-24 @ 04:05)  ascorbic acid: 500 milliGRAM(s) (07-07-24 @ 11:08),  500 milliGRAM(s) (07-06-24 @ 10:14),  500 milliGRAM(s) (07-05-24 @ 10:51),  500 milliGRAM(s) (07-04-24 @ 10:40),  500 milliGRAM(s) (07-03-24 @ 10:59),  500 milliGRAM(s) (07-02-24 @ 12:05),  500 milliGRAM(s) (07-01-24 @ 10:32),  500 milliGRAM(s) (06-30-24 @ 10:35),  500 milliGRAM(s) (06-29-24 @ 10:01),  500 milliGRAM(s) (06-28-24 @ 10:12),  500 milliGRAM(s) (06-27-24 @ 12:44),  500 milliGRAM(s) (06-26-24 @ 11:18),  500 milliGRAM(s) (06-25-24 @ 11:09),  500 milliGRAM(s) (06-24-24 @ 10:02),  500 milliGRAM(s) (06-23-24 @ 10:11),  500 milliGRAM(s) (06-22-24 @ 10:02),  500 milliGRAM(s) (06-21-24 @ 11:20),  500 milliGRAM(s) (06-20-24 @ 10:17),  500 milliGRAM(s) (06-19-24 @ 10:59)  betamethasone Injectable: 12 milliGRAM(s) (06-18-24 @ 17:30)  chlorhexidine 2% Cloths: 1 Application(s) (07-07-24 @ 11:16),  1 Application(s) (07-06-24 @ 10:15),  1 Application(s) (07-05-24 @ 06:58),  1 Application(s) (07-04-24 @ 05:39),  1 Application(s) (07-03-24 @ 05:02),  1 Application(s) (07-02-24 @ 05:29),  1 Application(s) (07-01-24 @ 05:29),  1 Application(s) (06-30-24 @ 06:03),  1 Application(s) (06-28-24 @ 21:00),  1 Application(s) (06-28-24 @ 06:10),  1 Application(s) (06-27-24 @ 12:44),  1 Application(s) (06-26-24 @ 06:27),  1 Application(s) (06-25-24 @ 11:08),  1 Application(s) (06-24-24 @ 06:03),  1 Application(s) (06-23-24 @ 06:16),  1 Application(s) (06-22-24 @ 06:43),  1 Application(s) (06-21-24 @ 11:18),  1 Application(s) (06-20-24 @ 10:16),  1 Application(s) (06-19-24 @ 10:59),  1 Application(s) (06-18-24 @ 11:49)  diphtheria/tetanus/pertussis (acellular) Vaccine (Adacel): 0.5 milliLiter(s) (06-18-24 @ 17:27)  ferrous    sulfate: 325 milliGRAM(s) (07-07-24 @ 11:08),  325 milliGRAM(s) (07-06-24 @ 10:15),  325 milliGRAM(s) (07-05-24 @ 10:51),  325 milliGRAM(s) (07-04-24 @ 10:40),  325 milliGRAM(s) (07-03-24 @ 10:59),  325 milliGRAM(s) (07-02-24 @ 12:04),  325 milliGRAM(s) (07-01-24 @ 10:31),  325 milliGRAM(s) (06-30-24 @ 10:35),  325 milliGRAM(s) (06-29-24 @ 10:00),  325 milliGRAM(s) (06-28-24 @ 10:11),  325 milliGRAM(s) (06-27-24 @ 12:44),  325 milliGRAM(s) (06-26-24 @ 11:18),  325 milliGRAM(s) (06-25-24 @ 11:09),  325 milliGRAM(s) (06-24-24 @ 10:02),  325 milliGRAM(s) (06-23-24 @ 10:11),  325 milliGRAM(s) (06-22-24 @ 10:02),  325 milliGRAM(s) (06-21-24 @ 11:21),  325 milliGRAM(s) (06-20-24 @ 10:17),  325 milliGRAM(s) (06-19-24 @ 10:59)  heparin   Injectable: 5000 Unit(s) (07-07-24 @ 22:09),  5000 Unit(s) (07-07-24 @ 11:08),  5000 Unit(s) (07-06-24 @ 22:04),  5000 Unit(s) (07-06-24 @ 10:15),  5000 Unit(s) (07-05-24 @ 21:34),  5000 Unit(s) (07-05-24 @ 10:50),  5000 Unit(s) (07-04-24 @ 21:02),  5000 Unit(s) (07-04-24 @ 10:41),  5000 Unit(s) (07-03-24 @ 21:22),  5000 Unit(s) (07-03-24 @ 10:59),  5000 Unit(s) (07-02-24 @ 21:57),  5000 Unit(s) (07-02-24 @ 12:05),  5000 Unit(s) (07-01-24 @ 21:02),  5000 Unit(s) (07-01-24 @ 10:32),  5000 Unit(s) (06-30-24 @ 21:21),  5000 Unit(s) (06-30-24 @ 10:51),  5000 Unit(s) (06-29-24 @ 22:23),  5000 Unit(s) (06-29-24 @ 10:00),  5000 Unit(s) (06-28-24 @ 21:07),  5000 Unit(s) (06-28-24 @ 10:12),  5000 Unit(s) (06-27-24 @ 22:23),  5000 Unit(s) (06-27-24 @ 12:44),  5000 Unit(s) (06-26-24 @ 22:19),  5000 Unit(s) (06-26-24 @ 11:18),  5000 Unit(s) (06-25-24 @ 22:00),  5000 Unit(s) (06-25-24 @ 11:10),  5000 Unit(s) (06-24-24 @ 21:03),  5000 Unit(s) (06-24-24 @ 10:03),  5000 Unit(s) (06-23-24 @ 21:31),  5000 Unit(s) (06-23-24 @ 10:11),  5000 Unit(s) (06-22-24 @ 22:13),  5000 Unit(s) (06-22-24 @ 10:01),  5000 Unit(s) (06-21-24 @ 21:45),  5000 Unit(s) (06-21-24 @ 11:20),  5000 Unit(s) (06-20-24 @ 21:04),  5000 Unit(s) (06-20-24 @ 10:17),  5000 Unit(s) (06-19-24 @ 22:01),  5000 Unit(s) (06-19-24 @ 10:31),  5000 Unit(s) (06-18-24 @ 22:15),  5000 Unit(s) (06-18-24 @ 11:47)  indomethacin: 25 milliGRAM(s) (06-18-24 @ 09:58),  25 milliGRAM(s) (06-18-24 @ 04:08)  lactated ringers Bolus: 500 mL/Hr (07-07-24 @ 10:16)  lactated ringers.: 50 mL/Hr (06-18-24 @ 09:10)  magnesium sulfate Infusion: 50 mL/Hr (06-18-24 @ 05:00)  prenatal multivitamin: 1 Tablet(s) (07-07-24 @ 11:08),  1 Tablet(s) (07-06-24 @ 10:15),  1 Tablet(s) (07-05-24 @ 10:50),  1 Tablet(s) (07-04-24 @ 10:40),  1 Tablet(s) (07-03-24 @ 10:59),  1 Tablet(s) (07-02-24 @ 12:04),  1 Tablet(s) (07-01-24 @ 10:35),  1 Tablet(s) (06-30-24 @ 10:35),  1 Tablet(s) (06-29-24 @ 10:00),  1 Tablet(s) (06-28-24 @ 10:11),  1 Tablet(s) (06-27-24 @ 12:44),  1 Tablet(s) (06-26-24 @ 11:18),  1 Tablet(s) (06-25-24 @ 11:09),  1 Tablet(s) (06-24-24 @ 10:03),  1 Tablet(s) (06-23-24 @ 10:11),  1 Tablet(s) (06-22-24 @ 10:02),  1 Tablet(s) (06-21-24 @ 11:20),  1 Tablet(s) (06-20-24 @ 10:16),  1 Tablet(s) (06-19-24 @ 10:59)  valACYclovir: 500 milliGRAM(s) (07-07-24 @ 22:09),  500 milliGRAM(s) (07-07-24 @ 11:08),  500 milliGRAM(s) (07-06-24 @ 22:06),  500 milliGRAM(s) (07-06-24 @ 10:14),  500 milliGRAM(s) (07-05-24 @ 21:34),  500 milliGRAM(s) (07-05-24 @ 10:50),  500 milliGRAM(s) (07-04-24 @ 21:01),  500 milliGRAM(s) (07-04-24 @ 10:41),  500 milliGRAM(s) (07-03-24 @ 21:22),  500 milliGRAM(s) (07-03-24 @ 10:59),  500 milliGRAM(s) (07-02-24 @ 21:57),  500 milliGRAM(s) (07-02-24 @ 12:04),  500 milliGRAM(s) (07-01-24 @ 21:02),  500 milliGRAM(s) (07-01-24 @ 10:31),  500 milliGRAM(s) (06-30-24 @ 21:20),  500 milliGRAM(s) (06-30-24 @ 10:35),  500 milliGRAM(s) (06-29-24 @ 22:23),  500 milliGRAM(s) (06-29-24 @ 10:01),  500 milliGRAM(s) (06-28-24 @ 21:06),  500 milliGRAM(s) (06-28-24 @ 10:11),  500 milliGRAM(s) (06-27-24 @ 22:22),  500 milliGRAM(s) (06-27-24 @ 12:44),  500 milliGRAM(s) (06-26-24 @ 22:19),  500 milliGRAM(s) (06-26-24 @ 11:17),  500 milliGRAM(s) (06-25-24 @ 21:48),  500 milliGRAM(s) (06-25-24 @ 11:09),  500 milliGRAM(s) (06-24-24 @ 21:01),  500 milliGRAM(s) (06-24-24 @ 10:02),  500 milliGRAM(s) (06-23-24 @ 21:32),  500 milliGRAM(s) (06-23-24 @ 10:11),  500 milliGRAM(s) (06-22-24 @ 22:13),  500 milliGRAM(s) (06-22-24 @ 10:01),  500 milliGRAM(s) (06-21-24 @ 21:46),  500 milliGRAM(s) (06-21-24 @ 11:21),  500 milliGRAM(s) (06-20-24 @ 21:02),  500 milliGRAM(s) (06-20-24 @ 10:17),  500 milliGRAM(s) (06-19-24 @ 22:01),  500 milliGRAM(s) (06-19-24 @ 10:32),  500 milliGRAM(s) (06-18-24 @ 21:25),  500 milliGRAM(s) (06-18-24 @ 08:06)      Labs:                        10.7   9.43  )-----------( 188      ( 07 Jul 2024 10:00 )             31.2

## 2024-07-09 ENCOUNTER — APPOINTMENT (OUTPATIENT)
Dept: ANTEPARTUM | Facility: CLINIC | Age: 29
End: 2024-07-09
Payer: MEDICAID

## 2024-07-09 ENCOUNTER — ASOB RESULT (OUTPATIENT)
Age: 29
End: 2024-07-09

## 2024-07-09 LAB
BASOPHILS # BLD AUTO: 0.06 K/UL — SIGNIFICANT CHANGE UP (ref 0–0.2)
BASOPHILS NFR BLD AUTO: 0.6 % — SIGNIFICANT CHANGE UP (ref 0–2)
BLD GP AB SCN SERPL QL: POSITIVE — SIGNIFICANT CHANGE UP
EOSINOPHIL # BLD AUTO: 0.12 K/UL — SIGNIFICANT CHANGE UP (ref 0–0.5)
EOSINOPHIL NFR BLD AUTO: 1.2 % — SIGNIFICANT CHANGE UP (ref 0–6)
HCT VFR BLD CALC: 33 % — LOW (ref 34.5–45)
HGB BLD-MCNC: 10.9 G/DL — LOW (ref 11.5–15.5)
IANC: 6.44 K/UL — SIGNIFICANT CHANGE UP (ref 1.8–7.4)
IMM GRANULOCYTES NFR BLD AUTO: 6.1 % — HIGH (ref 0–0.9)
LYMPHOCYTES # BLD AUTO: 2.27 K/UL — SIGNIFICANT CHANGE UP (ref 1–3.3)
LYMPHOCYTES # BLD AUTO: 21.8 % — SIGNIFICANT CHANGE UP (ref 13–44)
MCHC RBC-ENTMCNC: 28.5 PG — SIGNIFICANT CHANGE UP (ref 27–34)
MCHC RBC-ENTMCNC: 33 GM/DL — SIGNIFICANT CHANGE UP (ref 32–36)
MCV RBC AUTO: 86.4 FL — SIGNIFICANT CHANGE UP (ref 80–100)
MONOCYTES # BLD AUTO: 0.88 K/UL — SIGNIFICANT CHANGE UP (ref 0–0.9)
MONOCYTES NFR BLD AUTO: 8.5 % — SIGNIFICANT CHANGE UP (ref 2–14)
NEUTROPHILS # BLD AUTO: 6.44 K/UL — SIGNIFICANT CHANGE UP (ref 1.8–7.4)
NEUTROPHILS NFR BLD AUTO: 61.8 % — SIGNIFICANT CHANGE UP (ref 43–77)
NRBC # BLD: 0 /100 WBCS — SIGNIFICANT CHANGE UP (ref 0–0)
NRBC # FLD: 0 K/UL — SIGNIFICANT CHANGE UP (ref 0–0)
PLATELET # BLD AUTO: 210 K/UL — SIGNIFICANT CHANGE UP (ref 150–400)
RBC # BLD: 3.82 M/UL — SIGNIFICANT CHANGE UP (ref 3.8–5.2)
RBC # FLD: 13 % — SIGNIFICANT CHANGE UP (ref 10.3–14.5)
RH IG SCN BLD-IMP: NEGATIVE — SIGNIFICANT CHANGE UP
WBC # BLD: 10.41 K/UL — SIGNIFICANT CHANGE UP (ref 3.8–10.5)
WBC # FLD AUTO: 10.41 K/UL — SIGNIFICANT CHANGE UP (ref 3.8–10.5)

## 2024-07-09 PROCEDURE — 76819 FETAL BIOPHYS PROFIL W/O NST: CPT | Mod: 26,59

## 2024-07-09 PROCEDURE — 76816 OB US FOLLOW-UP PER FETUS: CPT | Mod: 26

## 2024-07-09 PROCEDURE — 99233 SBSQ HOSP IP/OBS HIGH 50: CPT

## 2024-07-09 PROCEDURE — 86077 PHYS BLOOD BANK SERV XMATCH: CPT

## 2024-07-09 RX ADMIN — Medication 1 APPLICATION(S): at 12:07

## 2024-07-09 RX ADMIN — Medication 325 MILLIGRAM(S): at 12:05

## 2024-07-09 RX ADMIN — Medication 1 TABLET(S): at 10:59

## 2024-07-09 RX ADMIN — HEPARIN SODIUM 5000 UNIT(S): 50 INJECTION, SOLUTION INTRAVENOUS at 10:59

## 2024-07-09 RX ADMIN — VALACYCLOVIR HYDROCHLORIDE 500 MILLIGRAM(S): 500 TABLET, FILM COATED ORAL at 22:34

## 2024-07-09 RX ADMIN — HEPARIN SODIUM 5000 UNIT(S): 50 INJECTION, SOLUTION INTRAVENOUS at 22:34

## 2024-07-09 RX ADMIN — Medication 500 MILLIGRAM(S): at 10:59

## 2024-07-09 RX ADMIN — VALACYCLOVIR HYDROCHLORIDE 500 MILLIGRAM(S): 500 TABLET, FILM COATED ORAL at 10:59

## 2024-07-09 NOTE — PROGRESS NOTE ADULT - SUBJECTIVE AND OBJECTIVE BOX
PGY3 Antepartum Note    Patient seen and examined at bedside, no acute overnight events. No acute complaints. Endorses small trickles of clear fluid.   Pt reports +FM, denies VB, ctx, HA, epigastric pain, blurred vision, CP, SOB, N/V, fevers, and chills.    T(F): 98.5 (06:22)  HR: 84 (06:22)  BP: 111/65 (06:22)  RR: 17 (06:22)    Gen: NAD  Cardio: rrr, s1/s2  Pulm: ca b/l  Abd: gravid, nontender, no palpable contractions  Ext: no edema,  no calf tenderness  SVE: deferred    Medications:  (ADM OVERRIDE): 1 Each (06-18-24 @ 03:50)  (ADM OVERRIDE): 1 Each (06-18-24 @ 03:50)  amoxicillin: 500 milliGRAM(s) (06-24-24 @ 21:01),  500 milliGRAM(s) (06-24-24 @ 13:44),  500 milliGRAM(s) (06-24-24 @ 06:03),  500 milliGRAM(s) (06-23-24 @ 21:31),  500 milliGRAM(s) (06-23-24 @ 13:43),  500 milliGRAM(s) (06-23-24 @ 06:16),  500 milliGRAM(s) (06-22-24 @ 22:13),  500 milliGRAM(s) (06-22-24 @ 13:36),  500 milliGRAM(s) (06-22-24 @ 06:43),  500 milliGRAM(s) (06-21-24 @ 21:45),  500 milliGRAM(s) (06-21-24 @ 14:53),  500 milliGRAM(s) (06-21-24 @ 05:12),  500 milliGRAM(s) (06-20-24 @ 21:03),  500 milliGRAM(s) (06-20-24 @ 15:08),  500 milliGRAM(s) (06-20-24 @ 06:05)  ampicillin  IVPB: 200 mL/Hr (06-18-24 @ 04:05)  ampicillin  IVPB: 200 mL/Hr (06-19-24 @ 22:04),  200 mL/Hr (06-19-24 @ 17:17),  200 mL/Hr (06-19-24 @ 10:31),  200 mL/Hr (06-19-24 @ 03:49),  200 mL/Hr (06-18-24 @ 22:13),  200 mL/Hr (06-18-24 @ 17:33),  200 mL/Hr (06-18-24 @ 09:55)  ascorbic acid: 500 milliGRAM(s) (07-08-24 @ 11:16),  500 milliGRAM(s) (07-07-24 @ 11:08),  500 milliGRAM(s) (07-06-24 @ 10:14),  500 milliGRAM(s) (07-05-24 @ 10:51),  500 milliGRAM(s) (07-04-24 @ 10:40),  500 milliGRAM(s) (07-03-24 @ 10:59),  500 milliGRAM(s) (07-02-24 @ 12:05),  500 milliGRAM(s) (07-01-24 @ 10:32),  500 milliGRAM(s) (06-30-24 @ 10:35),  500 milliGRAM(s) (06-29-24 @ 10:01),  500 milliGRAM(s) (06-28-24 @ 10:12),  500 milliGRAM(s) (06-27-24 @ 12:44),  500 milliGRAM(s) (06-26-24 @ 11:18),  500 milliGRAM(s) (06-25-24 @ 11:09),  500 milliGRAM(s) (06-24-24 @ 10:02),  500 milliGRAM(s) (06-23-24 @ 10:11),  500 milliGRAM(s) (06-22-24 @ 10:02),  500 milliGRAM(s) (06-21-24 @ 11:20),  500 milliGRAM(s) (06-20-24 @ 10:17),  500 milliGRAM(s) (06-19-24 @ 10:59)  betamethasone Injectable: 12 milliGRAM(s) (06-18-24 @ 17:30)  chlorhexidine 2% Cloths: 1 Application(s) (07-08-24 @ 11:18),  1 Application(s) (07-07-24 @ 11:16),  1 Application(s) (07-06-24 @ 10:15),  1 Application(s) (07-05-24 @ 06:58),  1 Application(s) (07-04-24 @ 05:39),  1 Application(s) (07-03-24 @ 05:02),  1 Application(s) (07-02-24 @ 05:29),  1 Application(s) (07-01-24 @ 05:29),  1 Application(s) (06-30-24 @ 06:03),  1 Application(s) (06-28-24 @ 21:00),  1 Application(s) (06-28-24 @ 06:10),  1 Application(s) (06-27-24 @ 12:44),  1 Application(s) (06-26-24 @ 06:27),  1 Application(s) (06-25-24 @ 11:08),  1 Application(s) (06-24-24 @ 06:03),  1 Application(s) (06-23-24 @ 06:16),  1 Application(s) (06-22-24 @ 06:43),  1 Application(s) (06-21-24 @ 11:18),  1 Application(s) (06-20-24 @ 10:16),  1 Application(s) (06-19-24 @ 10:59),  1 Application(s) (06-18-24 @ 11:49)  diphtheria/tetanus/pertussis (acellular) Vaccine (Adacel): 0.5 milliLiter(s) (06-18-24 @ 17:27)  ferrous    sulfate: 325 milliGRAM(s) (07-08-24 @ 11:17),  325 milliGRAM(s) (07-07-24 @ 11:08),  325 milliGRAM(s) (07-06-24 @ 10:15),  325 milliGRAM(s) (07-05-24 @ 10:51),  325 milliGRAM(s) (07-04-24 @ 10:40),  325 milliGRAM(s) (07-03-24 @ 10:59),  325 milliGRAM(s) (07-02-24 @ 12:04),  325 milliGRAM(s) (07-01-24 @ 10:31),  325 milliGRAM(s) (06-30-24 @ 10:35),  325 milliGRAM(s) (06-29-24 @ 10:00),  325 milliGRAM(s) (06-28-24 @ 10:11),  325 milliGRAM(s) (06-27-24 @ 12:44),  325 milliGRAM(s) (06-26-24 @ 11:18),  325 milliGRAM(s) (06-25-24 @ 11:09),  325 milliGRAM(s) (06-24-24 @ 10:02),  325 milliGRAM(s) (06-23-24 @ 10:11),  325 milliGRAM(s) (06-22-24 @ 10:02),  325 milliGRAM(s) (06-21-24 @ 11:21),  325 milliGRAM(s) (06-20-24 @ 10:17),  325 milliGRAM(s) (06-19-24 @ 10:59)  heparin   Injectable: 5000 Unit(s) (07-08-24 @ 21:45),  5000 Unit(s) (07-08-24 @ 11:17),  5000 Unit(s) (07-07-24 @ 22:09),  5000 Unit(s) (07-07-24 @ 11:08),  5000 Unit(s) (07-06-24 @ 22:04),  5000 Unit(s) (07-06-24 @ 10:15),  5000 Unit(s) (07-05-24 @ 21:34),  5000 Unit(s) (07-05-24 @ 10:50),  5000 Unit(s) (07-04-24 @ 21:02),  5000 Unit(s) (07-04-24 @ 10:41),  5000 Unit(s) (07-03-24 @ 21:22),  5000 Unit(s) (07-03-24 @ 10:59),  5000 Unit(s) (07-02-24 @ 21:57),  5000 Unit(s) (07-02-24 @ 12:05),  5000 Unit(s) (07-01-24 @ 21:02),  5000 Unit(s) (07-01-24 @ 10:32),  5000 Unit(s) (06-30-24 @ 21:21),  5000 Unit(s) (06-30-24 @ 10:51),  5000 Unit(s) (06-29-24 @ 22:23),  5000 Unit(s) (06-29-24 @ 10:00),  5000 Unit(s) (06-28-24 @ 21:07),  5000 Unit(s) (06-28-24 @ 10:12),  5000 Unit(s) (06-27-24 @ 22:23),  5000 Unit(s) (06-27-24 @ 12:44),  5000 Unit(s) (06-26-24 @ 22:19),  5000 Unit(s) (06-26-24 @ 11:18),  5000 Unit(s) (06-25-24 @ 22:00),  5000 Unit(s) (06-25-24 @ 11:10),  5000 Unit(s) (06-24-24 @ 21:03),  5000 Unit(s) (06-24-24 @ 10:03),  5000 Unit(s) (06-23-24 @ 21:31),  5000 Unit(s) (06-23-24 @ 10:11),  5000 Unit(s) (06-22-24 @ 22:13),  5000 Unit(s) (06-22-24 @ 10:01),  5000 Unit(s) (06-21-24 @ 21:45),  5000 Unit(s) (06-21-24 @ 11:20),  5000 Unit(s) (06-20-24 @ 21:04),  5000 Unit(s) (06-20-24 @ 10:17),  5000 Unit(s) (06-19-24 @ 22:01),  5000 Unit(s) (06-19-24 @ 10:31),  5000 Unit(s) (06-18-24 @ 22:15),  5000 Unit(s) (06-18-24 @ 11:47)  indomethacin: 25 milliGRAM(s) (06-18-24 @ 09:58),  25 milliGRAM(s) (06-18-24 @ 04:08)  lactated ringers Bolus: 500 mL/Hr (07-07-24 @ 10:16)  lactated ringers.: 50 mL/Hr (06-18-24 @ 09:10)  magnesium sulfate Infusion: 50 mL/Hr (06-18-24 @ 05:00)  prenatal multivitamin: 1 Tablet(s) (07-08-24 @ 11:16),  1 Tablet(s) (07-07-24 @ 11:08),  1 Tablet(s) (07-06-24 @ 10:15),  1 Tablet(s) (07-05-24 @ 10:50),  1 Tablet(s) (07-04-24 @ 10:40),  1 Tablet(s) (07-03-24 @ 10:59),  1 Tablet(s) (07-02-24 @ 12:04),  1 Tablet(s) (07-01-24 @ 10:35),  1 Tablet(s) (06-30-24 @ 10:35),  1 Tablet(s) (06-29-24 @ 10:00),  1 Tablet(s) (06-28-24 @ 10:11),  1 Tablet(s) (06-27-24 @ 12:44),  1 Tablet(s) (06-26-24 @ 11:18),  1 Tablet(s) (06-25-24 @ 11:09),  1 Tablet(s) (06-24-24 @ 10:03),  1 Tablet(s) (06-23-24 @ 10:11),  1 Tablet(s) (06-22-24 @ 10:02),  1 Tablet(s) (06-21-24 @ 11:20),  1 Tablet(s) (06-20-24 @ 10:16),  1 Tablet(s) (06-19-24 @ 10:59)  valACYclovir: 500 milliGRAM(s) (07-08-24 @ 21:45),  500 milliGRAM(s) (07-08-24 @ 11:16),  500 milliGRAM(s) (07-07-24 @ 22:09),  500 milliGRAM(s) (07-07-24 @ 11:08),  500 milliGRAM(s) (07-06-24 @ 22:06),  500 milliGRAM(s) (07-06-24 @ 10:14),  500 milliGRAM(s) (07-05-24 @ 21:34),  500 milliGRAM(s) (07-05-24 @ 10:50),  500 milliGRAM(s) (07-04-24 @ 21:01),  500 milliGRAM(s) (07-04-24 @ 10:41),  500 milliGRAM(s) (07-03-24 @ 21:22),  500 milliGRAM(s) (07-03-24 @ 10:59),  500 milliGRAM(s) (07-02-24 @ 21:57),  500 milliGRAM(s) (07-02-24 @ 12:04),  500 milliGRAM(s) (07-01-24 @ 21:02),  500 milliGRAM(s) (07-01-24 @ 10:31),  500 milliGRAM(s) (06-30-24 @ 21:20),  500 milliGRAM(s) (06-30-24 @ 10:35),  500 milliGRAM(s) (06-29-24 @ 22:23),  500 milliGRAM(s) (06-29-24 @ 10:01),  500 milliGRAM(s) (06-28-24 @ 21:06),  500 milliGRAM(s) (06-28-24 @ 10:11),  500 milliGRAM(s) (06-27-24 @ 22:22),  500 milliGRAM(s) (06-27-24 @ 12:44),  500 milliGRAM(s) (06-26-24 @ 22:19),  500 milliGRAM(s) (06-26-24 @ 11:17),  500 milliGRAM(s) (06-25-24 @ 21:48),  500 milliGRAM(s) (06-25-24 @ 11:09),  500 milliGRAM(s) (06-24-24 @ 21:01),  500 milliGRAM(s) (06-24-24 @ 10:02),  500 milliGRAM(s) (06-23-24 @ 21:32),  500 milliGRAM(s) (06-23-24 @ 10:11),  500 milliGRAM(s) (06-22-24 @ 22:13),  500 milliGRAM(s) (06-22-24 @ 10:01),  500 milliGRAM(s) (06-21-24 @ 21:46),  500 milliGRAM(s) (06-21-24 @ 11:21),  500 milliGRAM(s) (06-20-24 @ 21:02),  500 milliGRAM(s) (06-20-24 @ 10:17),  500 milliGRAM(s) (06-19-24 @ 22:01),  500 milliGRAM(s) (06-19-24 @ 10:32),  500 milliGRAM(s) (06-18-24 @ 21:25),  500 milliGRAM(s) (06-18-24 @ 08:06)      Labs:                        10.7   9.43  )-----------( 188 ( 07 Jul 2024 10:00 )             31.2

## 2024-07-09 NOTE — PROGRESS NOTE ADULT - ATTENDING COMMENTS
MFM Attending Note  Patient seen at bedside with MFM team.  Patient states normal fetal movement. She denies vaginal bleeding and clear leakage at baseline.    ICU Vital Signs Last 24 Hrs  T(C): 37 (2024 09:33), Max: 37 (2024 09:33)  T(F): 98.6 (2024 09:33), Max: 98.6 (2024 09:33)  HR: 90 (2024 09:33) (84 - 104)  BP: 120/63 (2024 09:33) (110/63 - 125/67)  BP(mean): --  ABP: --  ABP(mean): --  RR: 16 (2024 09:33) (16 - 17)  SpO2: 97% (2024 09:33) (96% - 100%)    O2 Parameters below as of 2024 09:33  Patient On (Oxygen Delivery Method): room air    abdomen: soft, nontender, gravid  extremities: no calf tenderness    EFM: 140, moderate variability, - accel, -decel  toco: quiet                        10.7   9.43  )-----------( 188      ( 2024 10:00 )             31.2     a/p:  28 y.o.  at 33w6d  # PPROM  # Primigravid  # GBS bacteruria  # HSV, asymptomatic  # Short cervix, s/p vaginal progesterone  # Palpitations, currently resolved    Patient s/p betamethasone -18, s/p latency antibiotics, GBS positive for treatment in labor; no signs of chorioamnionitis  Twice weekly ultrasounds, ultrasound today  Continue NST BID  Valtrex continued for suppression, asymptomatic  Palpitation: s/p cardiology evaluation with normal echo    Continued in hospital care secondary to PPROM, Planning delivery at 34 weeks and candidate for expectant management until then if maternal/fetal status remain reassuring.

## 2024-07-09 NOTE — PROGRESS NOTE ADULT - ASSESSMENT
A/P: 29yo  @33w6d transferred from [facility], admitted with PPROM @30wk5d on . Patient is clinically stable with no signs of infection or PTL.    #PPROM  - s/p Mg (-)  - s/p Indocin  - s/p betamethasone (-)  - Candidate for rescue BMZ on   - s/p ampicillin (-)  - s/p amoxicillin (-)   - Afebrile, no signs/symptoms of infection    #HSV  - No lesions on SSE  - Continue Valtrex 500mg BID for ppx    #Palpitations  - Outpatient TTE (): EF 65-70%, V ventricular systolic function hyperdynamic. No valvular disease.  - s/p Holter monitor  - Cardio OB recs: no issues from cardiac perspective expected for delivery/anesthesia    #Fetal wellbeing  - NST BID  - ATU (): vtx, post, JUAN 9.23, MVP 3.66, BPP 8/8  - ATU (): vtx, post, JUAN 3.45, MVP<2, BPP 6/8, EFW 1766g (58%), AC 63%  - GBS+    #Maternal wellbeing  - Contraception: undecided, but declines BS or IUD  - HSQ for DVT ppx  - PNV  - Fe, Vit C  - Regular diet  - s/p Tdap     #Disposition  - Candidate for exp mgmt until delivery at 34-36w unless change in fetal or maternal status  - IOL scheduled 7/15 at 2p (34w5d)    Brenton Ohraa, PGY-3

## 2024-07-10 PROCEDURE — 99232 SBSQ HOSP IP/OBS MODERATE 35: CPT | Mod: GC,25

## 2024-07-10 RX ADMIN — VALACYCLOVIR HYDROCHLORIDE 500 MILLIGRAM(S): 500 TABLET, FILM COATED ORAL at 10:45

## 2024-07-10 RX ADMIN — Medication 1 TABLET(S): at 10:44

## 2024-07-10 RX ADMIN — VALACYCLOVIR HYDROCHLORIDE 500 MILLIGRAM(S): 500 TABLET, FILM COATED ORAL at 22:35

## 2024-07-10 RX ADMIN — Medication 500 MILLIGRAM(S): at 10:45

## 2024-07-10 RX ADMIN — HEPARIN SODIUM 5000 UNIT(S): 50 INJECTION, SOLUTION INTRAVENOUS at 10:46

## 2024-07-10 RX ADMIN — Medication 325 MILLIGRAM(S): at 10:45

## 2024-07-10 RX ADMIN — HEPARIN SODIUM 5000 UNIT(S): 50 INJECTION, SOLUTION INTRAVENOUS at 22:35

## 2024-07-10 RX ADMIN — Medication 1 APPLICATION(S): at 10:56

## 2024-07-10 NOTE — PROGRESS NOTE ADULT - ATTENDING COMMENTS
MFM ATTENDING    29yo P0 at 34w0d with PPROM.     denies vb/ctx, reports normal fetal movement. endorses ongoing small volume leakage of clear fluid.     s/p acs -  s/p mag -  s/p NICU consult   fetus AGA 1766g 58% on   s/p indocin  s/p latency abx    Continue valtrex  Continue current management, routine ap care.   Recommend goal for delivery at 34 weeks. Patient wants to wait for Monday 34w5d so  can be present for delivery. Discussed in event of spontaneous labor or other change in maternal and/or fetal status, iatrogenic delivery prior to this date / GA may become increasingly indicated.   MOD: planned vaginal, with  reserved for routine obstetrical indications.     All questions answered to patients and her mothers satisfaction. MFM ATTENDING    29yo P0 at 34w0d with PPROM.     denies vb, denies feeling ctx, reports normal fetal movement. endorses ongoing small volume leakage of clear fluid.     s/p acs -18  s/p mag -  s/p NICU consult   fetus AGA 1766g 58% on   s/p indocin  s/p latency abx    135 / mod / +a / no decels  ctx q3-6 minutes.    Continue valtrex  Continue current management, routine ap care.   Recommend goal for delivery at 34 weeks. Patient wants to wait for Monday 34w5d so  can be present for delivery. Discussed in event of spontaneous labor or other change in maternal and/or fetal status, iatrogenic delivery prior to this date / GA may become increasingly indicated.   MOD: planned vaginal, with  reserved for routine obstetrical indications.     All questions answered to patients and her mothers satisfaction.

## 2024-07-10 NOTE — PROGRESS NOTE ADULT - ASSESSMENT
A/P: 27yo  @34w0d transferred from [facility], admitted with PPROM @30wk5d on . Patient is clinically stable with no signs of infection or PTL.    #PPROM  - s/p Mg (-)  - s/p Indocin  - s/p betamethasone (-)  - Candidate for rescue BMZ on   - s/p ampicillin (-)  - s/p amoxicillin (-)   - Afebrile, no signs/symptoms of infection    #HSV  - No lesions on SSE  - Continue Valtrex 500mg BID for ppx    #Palpitations  - Outpatient TTE (): EF 65-70%, V ventricular systolic function hyperdynamic. No valvular disease.  - s/p Holter monitor  - Cardio OB recs: no issues from cardiac perspective expected for delivery/anesthesia    #Fetal wellbeing  - NST BID  - ATU (): vtx, post, JUAN 9.23, MVP 3.66, BPP 8/8  - ATU (): vtx, post, JUAN 3.45, MVP<2, BPP 6/8, EFW 1766g (58%), AC 63%  - GBS+    #Maternal wellbeing  - Contraception: undecided, but declines BS or IUD  - HSQ for DVT ppx  - PNV  - Fe, Vit C  - Regular diet  - s/p Tdap     #Disposition  - Candidate for exp mgmt until delivery at 34-36w unless change in fetal or maternal status  - IOL scheduled 7/15 at 2p (34w5d)    Brenton Ohara, PGY-3

## 2024-07-10 NOTE — PROGRESS NOTE ADULT - SUBJECTIVE AND OBJECTIVE BOX
PGY3 Antepartum Note    Patient seen and examined at bedside, no acute overnight events. No acute complaints. Endorses small trickles of clear fluid.   Pt reports +FM, denies VB, ctx, HA, epigastric pain, blurred vision, CP, SOB, N/V, fevers, and chills.    T(F): 98.5 (06:11)  HR: 86 (06:11)  BP: 109/62 (06:11)  RR: 17 (06:11)    Gen: NAD  Cardio: rrr, s1/s2  Pulm: ca b/l  Abd: gravid, nontender, no palpable contractions  Ext: no edema,  no calf tenderness  SVE: deferred    Medications:  (ADM OVERRIDE): 1 Each (06-18-24 @ 03:50)  (ADM OVERRIDE): 1 Each (06-18-24 @ 03:50)  amoxicillin: 500 milliGRAM(s) (06-24-24 @ 21:01),  500 milliGRAM(s) (06-24-24 @ 13:44),  500 milliGRAM(s) (06-24-24 @ 06:03),  500 milliGRAM(s) (06-23-24 @ 21:31),  500 milliGRAM(s) (06-23-24 @ 13:43),  500 milliGRAM(s) (06-23-24 @ 06:16),  500 milliGRAM(s) (06-22-24 @ 22:13),  500 milliGRAM(s) (06-22-24 @ 13:36),  500 milliGRAM(s) (06-22-24 @ 06:43),  500 milliGRAM(s) (06-21-24 @ 21:45),  500 milliGRAM(s) (06-21-24 @ 14:53),  500 milliGRAM(s) (06-21-24 @ 05:12),  500 milliGRAM(s) (06-20-24 @ 21:03),  500 milliGRAM(s) (06-20-24 @ 15:08),  500 milliGRAM(s) (06-20-24 @ 06:05)  ampicillin  IVPB: 200 mL/Hr (06-19-24 @ 22:04),  200 mL/Hr (06-19-24 @ 17:17),  200 mL/Hr (06-19-24 @ 10:31),  200 mL/Hr (06-19-24 @ 03:49),  200 mL/Hr (06-18-24 @ 22:13),  200 mL/Hr (06-18-24 @ 17:33),  200 mL/Hr (06-18-24 @ 09:55)  ampicillin  IVPB: 200 mL/Hr (06-18-24 @ 04:05)  ascorbic acid: 500 milliGRAM(s) (07-09-24 @ 10:59),  500 milliGRAM(s) (07-08-24 @ 11:16),  500 milliGRAM(s) (07-07-24 @ 11:08),  500 milliGRAM(s) (07-06-24 @ 10:14),  500 milliGRAM(s) (07-05-24 @ 10:51),  500 milliGRAM(s) (07-04-24 @ 10:40),  500 milliGRAM(s) (07-03-24 @ 10:59),  500 milliGRAM(s) (07-02-24 @ 12:05),  500 milliGRAM(s) (07-01-24 @ 10:32),  500 milliGRAM(s) (06-30-24 @ 10:35),  500 milliGRAM(s) (06-29-24 @ 10:01),  500 milliGRAM(s) (06-28-24 @ 10:12),  500 milliGRAM(s) (06-27-24 @ 12:44),  500 milliGRAM(s) (06-26-24 @ 11:18),  500 milliGRAM(s) (06-25-24 @ 11:09),  500 milliGRAM(s) (06-24-24 @ 10:02),  500 milliGRAM(s) (06-23-24 @ 10:11),  500 milliGRAM(s) (06-22-24 @ 10:02),  500 milliGRAM(s) (06-21-24 @ 11:20),  500 milliGRAM(s) (06-20-24 @ 10:17),  500 milliGRAM(s) (06-19-24 @ 10:59)  betamethasone Injectable: 12 milliGRAM(s) (06-18-24 @ 17:30)  chlorhexidine 2% Cloths: 1 Application(s) (07-09-24 @ 12:07),  1 Application(s) (07-08-24 @ 11:18),  1 Application(s) (07-07-24 @ 11:16),  1 Application(s) (07-06-24 @ 10:15),  1 Application(s) (07-05-24 @ 06:58),  1 Application(s) (07-04-24 @ 05:39),  1 Application(s) (07-03-24 @ 05:02),  1 Application(s) (07-02-24 @ 05:29),  1 Application(s) (07-01-24 @ 05:29),  1 Application(s) (06-30-24 @ 06:03),  1 Application(s) (06-28-24 @ 21:00),  1 Application(s) (06-28-24 @ 06:10),  1 Application(s) (06-27-24 @ 12:44),  1 Application(s) (06-26-24 @ 06:27),  1 Application(s) (06-25-24 @ 11:08),  1 Application(s) (06-24-24 @ 06:03),  1 Application(s) (06-23-24 @ 06:16),  1 Application(s) (06-22-24 @ 06:43),  1 Application(s) (06-21-24 @ 11:18),  1 Application(s) (06-20-24 @ 10:16),  1 Application(s) (06-19-24 @ 10:59),  1 Application(s) (06-18-24 @ 11:49)  diphtheria/tetanus/pertussis (acellular) Vaccine (Adacel): 0.5 milliLiter(s) (06-18-24 @ 17:27)  ferrous    sulfate: 325 milliGRAM(s) (07-09-24 @ 12:05),  325 milliGRAM(s) (07-08-24 @ 11:17),  325 milliGRAM(s) (07-07-24 @ 11:08),  325 milliGRAM(s) (07-06-24 @ 10:15),  325 milliGRAM(s) (07-05-24 @ 10:51),  325 milliGRAM(s) (07-04-24 @ 10:40),  325 milliGRAM(s) (07-03-24 @ 10:59),  325 milliGRAM(s) (07-02-24 @ 12:04),  325 milliGRAM(s) (07-01-24 @ 10:31),  325 milliGRAM(s) (06-30-24 @ 10:35),  325 milliGRAM(s) (06-29-24 @ 10:00),  325 milliGRAM(s) (06-28-24 @ 10:11),  325 milliGRAM(s) (06-27-24 @ 12:44),  325 milliGRAM(s) (06-26-24 @ 11:18),  325 milliGRAM(s) (06-25-24 @ 11:09),  325 milliGRAM(s) (06-24-24 @ 10:02),  325 milliGRAM(s) (06-23-24 @ 10:11),  325 milliGRAM(s) (06-22-24 @ 10:02),  325 milliGRAM(s) (06-21-24 @ 11:21),  325 milliGRAM(s) (06-20-24 @ 10:17),  325 milliGRAM(s) (06-19-24 @ 10:59)  heparin   Injectable: 5000 Unit(s) (07-09-24 @ 22:34),  5000 Unit(s) (07-09-24 @ 10:59),  5000 Unit(s) (07-08-24 @ 21:45),  5000 Unit(s) (07-08-24 @ 11:17),  5000 Unit(s) (07-07-24 @ 22:09),  5000 Unit(s) (07-07-24 @ 11:08),  5000 Unit(s) (07-06-24 @ 22:04),  5000 Unit(s) (07-06-24 @ 10:15),  5000 Unit(s) (07-05-24 @ 21:34),  5000 Unit(s) (07-05-24 @ 10:50),  5000 Unit(s) (07-04-24 @ 21:02),  5000 Unit(s) (07-04-24 @ 10:41),  5000 Unit(s) (07-03-24 @ 21:22),  5000 Unit(s) (07-03-24 @ 10:59),  5000 Unit(s) (07-02-24 @ 21:57),  5000 Unit(s) (07-02-24 @ 12:05),  5000 Unit(s) (07-01-24 @ 21:02),  5000 Unit(s) (07-01-24 @ 10:32),  5000 Unit(s) (06-30-24 @ 21:21),  5000 Unit(s) (06-30-24 @ 10:51),  5000 Unit(s) (06-29-24 @ 22:23),  5000 Unit(s) (06-29-24 @ 10:00),  5000 Unit(s) (06-28-24 @ 21:07),  5000 Unit(s) (06-28-24 @ 10:12),  5000 Unit(s) (06-27-24 @ 22:23),  5000 Unit(s) (06-27-24 @ 12:44),  5000 Unit(s) (06-26-24 @ 22:19),  5000 Unit(s) (06-26-24 @ 11:18),  5000 Unit(s) (06-25-24 @ 22:00),  5000 Unit(s) (06-25-24 @ 11:10),  5000 Unit(s) (06-24-24 @ 21:03),  5000 Unit(s) (06-24-24 @ 10:03),  5000 Unit(s) (06-23-24 @ 21:31),  5000 Unit(s) (06-23-24 @ 10:11),  5000 Unit(s) (06-22-24 @ 22:13),  5000 Unit(s) (06-22-24 @ 10:01),  5000 Unit(s) (06-21-24 @ 21:45),  5000 Unit(s) (06-21-24 @ 11:20),  5000 Unit(s) (06-20-24 @ 21:04),  5000 Unit(s) (06-20-24 @ 10:17),  5000 Unit(s) (06-19-24 @ 22:01),  5000 Unit(s) (06-19-24 @ 10:31),  5000 Unit(s) (06-18-24 @ 22:15),  5000 Unit(s) (06-18-24 @ 11:47)  indomethacin: 25 milliGRAM(s) (06-18-24 @ 09:58),  25 milliGRAM(s) (06-18-24 @ 04:08)  lactated ringers Bolus: 500 mL/Hr (07-07-24 @ 10:16)  lactated ringers.: 50 mL/Hr (06-18-24 @ 09:10)  magnesium sulfate Infusion: 50 mL/Hr (06-18-24 @ 05:00)  prenatal multivitamin: 1 Tablet(s) (07-09-24 @ 10:59),  1 Tablet(s) (07-08-24 @ 11:16),  1 Tablet(s) (07-07-24 @ 11:08),  1 Tablet(s) (07-06-24 @ 10:15),  1 Tablet(s) (07-05-24 @ 10:50),  1 Tablet(s) (07-04-24 @ 10:40),  1 Tablet(s) (07-03-24 @ 10:59),  1 Tablet(s) (07-02-24 @ 12:04),  1 Tablet(s) (07-01-24 @ 10:35),  1 Tablet(s) (06-30-24 @ 10:35),  1 Tablet(s) (06-29-24 @ 10:00),  1 Tablet(s) (06-28-24 @ 10:11),  1 Tablet(s) (06-27-24 @ 12:44),  1 Tablet(s) (06-26-24 @ 11:18),  1 Tablet(s) (06-25-24 @ 11:09),  1 Tablet(s) (06-24-24 @ 10:03),  1 Tablet(s) (06-23-24 @ 10:11),  1 Tablet(s) (06-22-24 @ 10:02),  1 Tablet(s) (06-21-24 @ 11:20),  1 Tablet(s) (06-20-24 @ 10:16),  1 Tablet(s) (06-19-24 @ 10:59)  valACYclovir: 500 milliGRAM(s) (07-09-24 @ 22:34),  500 milliGRAM(s) (07-09-24 @ 10:59),  500 milliGRAM(s) (07-08-24 @ 21:45),  500 milliGRAM(s) (07-08-24 @ 11:16),  500 milliGRAM(s) (07-07-24 @ 22:09),  500 milliGRAM(s) (07-07-24 @ 11:08),  500 milliGRAM(s) (07-06-24 @ 22:06),  500 milliGRAM(s) (07-06-24 @ 10:14),  500 milliGRAM(s) (07-05-24 @ 21:34),  500 milliGRAM(s) (07-05-24 @ 10:50),  500 milliGRAM(s) (07-04-24 @ 21:01),  500 milliGRAM(s) (07-04-24 @ 10:41),  500 milliGRAM(s) (07-03-24 @ 21:22),  500 milliGRAM(s) (07-03-24 @ 10:59),  500 milliGRAM(s) (07-02-24 @ 21:57),  500 milliGRAM(s) (07-02-24 @ 12:04),  500 milliGRAM(s) (07-01-24 @ 21:02),  500 milliGRAM(s) (07-01-24 @ 10:31),  500 milliGRAM(s) (06-30-24 @ 21:20),  500 milliGRAM(s) (06-30-24 @ 10:35),  500 milliGRAM(s) (06-29-24 @ 22:23),  500 milliGRAM(s) (06-29-24 @ 10:01),  500 milliGRAM(s) (06-28-24 @ 21:06),  500 milliGRAM(s) (06-28-24 @ 10:11),  500 milliGRAM(s) (06-27-24 @ 22:22),  500 milliGRAM(s) (06-27-24 @ 12:44),  500 milliGRAM(s) (06-26-24 @ 22:19),  500 milliGRAM(s) (06-26-24 @ 11:17),  500 milliGRAM(s) (06-25-24 @ 21:48),  500 milliGRAM(s) (06-25-24 @ 11:09),  500 milliGRAM(s) (06-24-24 @ 21:01),  500 milliGRAM(s) (06-24-24 @ 10:02),  500 milliGRAM(s) (06-23-24 @ 21:32),  500 milliGRAM(s) (06-23-24 @ 10:11),  500 milliGRAM(s) (06-22-24 @ 22:13),  500 milliGRAM(s) (06-22-24 @ 10:01),  500 milliGRAM(s) (06-21-24 @ 21:46),  500 milliGRAM(s) (06-21-24 @ 11:21),  500 milliGRAM(s) (06-20-24 @ 21:02),  500 milliGRAM(s) (06-20-24 @ 10:17),  500 milliGRAM(s) (06-19-24 @ 22:01),  500 milliGRAM(s) (06-19-24 @ 10:32),  500 milliGRAM(s) (06-18-24 @ 21:25),  500 milliGRAM(s) (06-18-24 @ 08:06)      Labs:                        10.9   10.41 )-----------( 210 ( 09 Jul 2024 23:01 )             33.0

## 2024-07-11 ENCOUNTER — ASOB RESULT (OUTPATIENT)
Age: 29
End: 2024-07-11

## 2024-07-11 ENCOUNTER — APPOINTMENT (OUTPATIENT)
Dept: ANTEPARTUM | Facility: CLINIC | Age: 29
End: 2024-07-11

## 2024-07-11 PROCEDURE — 99233 SBSQ HOSP IP/OBS HIGH 50: CPT

## 2024-07-11 RX ADMIN — Medication 1 TABLET(S): at 10:46

## 2024-07-11 RX ADMIN — HEPARIN SODIUM 5000 UNIT(S): 50 INJECTION, SOLUTION INTRAVENOUS at 10:45

## 2024-07-11 RX ADMIN — Medication 1 APPLICATION(S): at 10:50

## 2024-07-11 RX ADMIN — VALACYCLOVIR HYDROCHLORIDE 500 MILLIGRAM(S): 500 TABLET, FILM COATED ORAL at 21:36

## 2024-07-11 RX ADMIN — VALACYCLOVIR HYDROCHLORIDE 500 MILLIGRAM(S): 500 TABLET, FILM COATED ORAL at 10:46

## 2024-07-11 RX ADMIN — Medication 500 MILLIGRAM(S): at 10:45

## 2024-07-11 RX ADMIN — HEPARIN SODIUM 5000 UNIT(S): 50 INJECTION, SOLUTION INTRAVENOUS at 21:36

## 2024-07-11 RX ADMIN — Medication 325 MILLIGRAM(S): at 10:46

## 2024-07-11 NOTE — PROGRESS NOTE ADULT - SUBJECTIVE AND OBJECTIVE BOX
PGY3 Antepartum Note    Patient seen and examined at bedside, no acute overnight events. No acute complaints. Endorses small trickles of clear fluid.   Pt reports +FM, denies VB, ctx, HA, epigastric pain, blurred vision, CP, SOB, N/V, fevers, and chills.    T(F): 98.8 (05:23)  HR: 82 (05:23)  BP: 114/62 (05:23)  RR: 18 (05:23)    Gen: NAD  Cardio: rrr, s1/s2  Pulm: ca b/l  Abd: gravid, nontender, no palpable contractions  Ext: no edema,  no calf tenderness  SVE: deferred    Medications:  (ADM OVERRIDE): 1 Each (06-18-24 @ 03:50)  (ADM OVERRIDE): 1 Each (06-18-24 @ 03:50)  amoxicillin: 500 milliGRAM(s) (06-24-24 @ 21:01),  500 milliGRAM(s) (06-24-24 @ 13:44),  500 milliGRAM(s) (06-24-24 @ 06:03),  500 milliGRAM(s) (06-23-24 @ 21:31),  500 milliGRAM(s) (06-23-24 @ 13:43),  500 milliGRAM(s) (06-23-24 @ 06:16),  500 milliGRAM(s) (06-22-24 @ 22:13),  500 milliGRAM(s) (06-22-24 @ 13:36),  500 milliGRAM(s) (06-22-24 @ 06:43),  500 milliGRAM(s) (06-21-24 @ 21:45),  500 milliGRAM(s) (06-21-24 @ 14:53),  500 milliGRAM(s) (06-21-24 @ 05:12),  500 milliGRAM(s) (06-20-24 @ 21:03),  500 milliGRAM(s) (06-20-24 @ 15:08),  500 milliGRAM(s) (06-20-24 @ 06:05)  ampicillin  IVPB: 200 mL/Hr (06-19-24 @ 22:04),  200 mL/Hr (06-19-24 @ 17:17),  200 mL/Hr (06-19-24 @ 10:31),  200 mL/Hr (06-19-24 @ 03:49),  200 mL/Hr (06-18-24 @ 22:13),  200 mL/Hr (06-18-24 @ 17:33),  200 mL/Hr (06-18-24 @ 09:55)  ampicillin  IVPB: 200 mL/Hr (06-18-24 @ 04:05)  ascorbic acid: 500 milliGRAM(s) (07-10-24 @ 10:45),  500 milliGRAM(s) (07-09-24 @ 10:59),  500 milliGRAM(s) (07-08-24 @ 11:16),  500 milliGRAM(s) (07-07-24 @ 11:08),  500 milliGRAM(s) (07-06-24 @ 10:14),  500 milliGRAM(s) (07-05-24 @ 10:51),  500 milliGRAM(s) (07-04-24 @ 10:40),  500 milliGRAM(s) (07-03-24 @ 10:59),  500 milliGRAM(s) (07-02-24 @ 12:05),  500 milliGRAM(s) (07-01-24 @ 10:32),  500 milliGRAM(s) (06-30-24 @ 10:35),  500 milliGRAM(s) (06-29-24 @ 10:01),  500 milliGRAM(s) (06-28-24 @ 10:12),  500 milliGRAM(s) (06-27-24 @ 12:44),  500 milliGRAM(s) (06-26-24 @ 11:18),  500 milliGRAM(s) (06-25-24 @ 11:09),  500 milliGRAM(s) (06-24-24 @ 10:02),  500 milliGRAM(s) (06-23-24 @ 10:11),  500 milliGRAM(s) (06-22-24 @ 10:02),  500 milliGRAM(s) (06-21-24 @ 11:20),  500 milliGRAM(s) (06-20-24 @ 10:17),  500 milliGRAM(s) (06-19-24 @ 10:59)  betamethasone Injectable: 12 milliGRAM(s) (06-18-24 @ 17:30)  chlorhexidine 2% Cloths: 1 Application(s) (07-10-24 @ 10:56),  1 Application(s) (07-09-24 @ 12:07),  1 Application(s) (07-08-24 @ 11:18),  1 Application(s) (07-07-24 @ 11:16),  1 Application(s) (07-06-24 @ 10:15),  1 Application(s) (07-05-24 @ 06:58),  1 Application(s) (07-04-24 @ 05:39),  1 Application(s) (07-03-24 @ 05:02),  1 Application(s) (07-02-24 @ 05:29),  1 Application(s) (07-01-24 @ 05:29),  1 Application(s) (06-30-24 @ 06:03),  1 Application(s) (06-28-24 @ 21:00),  1 Application(s) (06-28-24 @ 06:10),  1 Application(s) (06-27-24 @ 12:44),  1 Application(s) (06-26-24 @ 06:27),  1 Application(s) (06-25-24 @ 11:08),  1 Application(s) (06-24-24 @ 06:03),  1 Application(s) (06-23-24 @ 06:16),  1 Application(s) (06-22-24 @ 06:43),  1 Application(s) (06-21-24 @ 11:18),  1 Application(s) (06-20-24 @ 10:16),  1 Application(s) (06-19-24 @ 10:59),  1 Application(s) (06-18-24 @ 11:49)  diphtheria/tetanus/pertussis (acellular) Vaccine (Adacel): 0.5 milliLiter(s) (06-18-24 @ 17:27)  ferrous    sulfate: 325 milliGRAM(s) (07-10-24 @ 10:45),  325 milliGRAM(s) (07-09-24 @ 12:05),  325 milliGRAM(s) (07-08-24 @ 11:17),  325 milliGRAM(s) (07-07-24 @ 11:08),  325 milliGRAM(s) (07-06-24 @ 10:15),  325 milliGRAM(s) (07-05-24 @ 10:51),  325 milliGRAM(s) (07-04-24 @ 10:40),  325 milliGRAM(s) (07-03-24 @ 10:59),  325 milliGRAM(s) (07-02-24 @ 12:04),  325 milliGRAM(s) (07-01-24 @ 10:31),  325 milliGRAM(s) (06-30-24 @ 10:35),  325 milliGRAM(s) (06-29-24 @ 10:00),  325 milliGRAM(s) (06-28-24 @ 10:11),  325 milliGRAM(s) (06-27-24 @ 12:44),  325 milliGRAM(s) (06-26-24 @ 11:18),  325 milliGRAM(s) (06-25-24 @ 11:09),  325 milliGRAM(s) (06-24-24 @ 10:02),  325 milliGRAM(s) (06-23-24 @ 10:11),  325 milliGRAM(s) (06-22-24 @ 10:02),  325 milliGRAM(s) (06-21-24 @ 11:21),  325 milliGRAM(s) (06-20-24 @ 10:17),  325 milliGRAM(s) (06-19-24 @ 10:59)  heparin   Injectable: 5000 Unit(s) (07-10-24 @ 22:35),  5000 Unit(s) (07-10-24 @ 10:46),  5000 Unit(s) (07-09-24 @ 22:34),  5000 Unit(s) (07-09-24 @ 10:59),  5000 Unit(s) (07-08-24 @ 21:45),  5000 Unit(s) (07-08-24 @ 11:17),  5000 Unit(s) (07-07-24 @ 22:09),  5000 Unit(s) (07-07-24 @ 11:08),  5000 Unit(s) (07-06-24 @ 22:04),  5000 Unit(s) (07-06-24 @ 10:15),  5000 Unit(s) (07-05-24 @ 21:34),  5000 Unit(s) (07-05-24 @ 10:50),  5000 Unit(s) (07-04-24 @ 21:02),  5000 Unit(s) (07-04-24 @ 10:41),  5000 Unit(s) (07-03-24 @ 21:22),  5000 Unit(s) (07-03-24 @ 10:59),  5000 Unit(s) (07-02-24 @ 21:57),  5000 Unit(s) (07-02-24 @ 12:05),  5000 Unit(s) (07-01-24 @ 21:02),  5000 Unit(s) (07-01-24 @ 10:32),  5000 Unit(s) (06-30-24 @ 21:21),  5000 Unit(s) (06-30-24 @ 10:51),  5000 Unit(s) (06-29-24 @ 22:23),  5000 Unit(s) (06-29-24 @ 10:00),  5000 Unit(s) (06-28-24 @ 21:07),  5000 Unit(s) (06-28-24 @ 10:12),  5000 Unit(s) (06-27-24 @ 22:23),  5000 Unit(s) (06-27-24 @ 12:44),  5000 Unit(s) (06-26-24 @ 22:19),  5000 Unit(s) (06-26-24 @ 11:18),  5000 Unit(s) (06-25-24 @ 22:00),  5000 Unit(s) (06-25-24 @ 11:10),  5000 Unit(s) (06-24-24 @ 21:03),  5000 Unit(s) (06-24-24 @ 10:03),  5000 Unit(s) (06-23-24 @ 21:31),  5000 Unit(s) (06-23-24 @ 10:11),  5000 Unit(s) (06-22-24 @ 22:13),  5000 Unit(s) (06-22-24 @ 10:01),  5000 Unit(s) (06-21-24 @ 21:45),  5000 Unit(s) (06-21-24 @ 11:20),  5000 Unit(s) (06-20-24 @ 21:04),  5000 Unit(s) (06-20-24 @ 10:17),  5000 Unit(s) (06-19-24 @ 22:01),  5000 Unit(s) (06-19-24 @ 10:31),  5000 Unit(s) (06-18-24 @ 22:15),  5000 Unit(s) (06-18-24 @ 11:47)  indomethacin: 25 milliGRAM(s) (06-18-24 @ 09:58),  25 milliGRAM(s) (06-18-24 @ 04:08)  lactated ringers Bolus: 500 mL/Hr (07-07-24 @ 10:16)  lactated ringers.: 50 mL/Hr (06-18-24 @ 09:10)  magnesium sulfate Infusion: 50 mL/Hr (06-18-24 @ 05:00)  prenatal multivitamin: 1 Tablet(s) (07-10-24 @ 10:44),  1 Tablet(s) (07-09-24 @ 10:59),  1 Tablet(s) (07-08-24 @ 11:16),  1 Tablet(s) (07-07-24 @ 11:08),  1 Tablet(s) (07-06-24 @ 10:15),  1 Tablet(s) (07-05-24 @ 10:50),  1 Tablet(s) (07-04-24 @ 10:40),  1 Tablet(s) (07-03-24 @ 10:59),  1 Tablet(s) (07-02-24 @ 12:04),  1 Tablet(s) (07-01-24 @ 10:35),  1 Tablet(s) (06-30-24 @ 10:35),  1 Tablet(s) (06-29-24 @ 10:00),  1 Tablet(s) (06-28-24 @ 10:11),  1 Tablet(s) (06-27-24 @ 12:44),  1 Tablet(s) (06-26-24 @ 11:18),  1 Tablet(s) (06-25-24 @ 11:09),  1 Tablet(s) (06-24-24 @ 10:03),  1 Tablet(s) (06-23-24 @ 10:11),  1 Tablet(s) (06-22-24 @ 10:02),  1 Tablet(s) (06-21-24 @ 11:20),  1 Tablet(s) (06-20-24 @ 10:16),  1 Tablet(s) (06-19-24 @ 10:59)  valACYclovir: 500 milliGRAM(s) (07-10-24 @ 22:35),  500 milliGRAM(s) (07-10-24 @ 10:45),  500 milliGRAM(s) (07-09-24 @ 22:34),  500 milliGRAM(s) (07-09-24 @ 10:59),  500 milliGRAM(s) (07-08-24 @ 21:45),  500 milliGRAM(s) (07-08-24 @ 11:16),  500 milliGRAM(s) (07-07-24 @ 22:09),  500 milliGRAM(s) (07-07-24 @ 11:08),  500 milliGRAM(s) (07-06-24 @ 22:06),  500 milliGRAM(s) (07-06-24 @ 10:14),  500 milliGRAM(s) (07-05-24 @ 21:34),  500 milliGRAM(s) (07-05-24 @ 10:50),  500 milliGRAM(s) (07-04-24 @ 21:01),  500 milliGRAM(s) (07-04-24 @ 10:41),  500 milliGRAM(s) (07-03-24 @ 21:22),  500 milliGRAM(s) (07-03-24 @ 10:59),  500 milliGRAM(s) (07-02-24 @ 21:57),  500 milliGRAM(s) (07-02-24 @ 12:04),  500 milliGRAM(s) (07-01-24 @ 21:02),  500 milliGRAM(s) (07-01-24 @ 10:31),  500 milliGRAM(s) (06-30-24 @ 21:20),  500 milliGRAM(s) (06-30-24 @ 10:35),  500 milliGRAM(s) (06-29-24 @ 22:23),  500 milliGRAM(s) (06-29-24 @ 10:01),  500 milliGRAM(s) (06-28-24 @ 21:06),  500 milliGRAM(s) (06-28-24 @ 10:11),  500 milliGRAM(s) (06-27-24 @ 22:22),  500 milliGRAM(s) (06-27-24 @ 12:44),  500 milliGRAM(s) (06-26-24 @ 22:19),  500 milliGRAM(s) (06-26-24 @ 11:17),  500 milliGRAM(s) (06-25-24 @ 21:48),  500 milliGRAM(s) (06-25-24 @ 11:09),  500 milliGRAM(s) (06-24-24 @ 21:01),  500 milliGRAM(s) (06-24-24 @ 10:02),  500 milliGRAM(s) (06-23-24 @ 21:32),  500 milliGRAM(s) (06-23-24 @ 10:11),  500 milliGRAM(s) (06-22-24 @ 22:13),  500 milliGRAM(s) (06-22-24 @ 10:01),  500 milliGRAM(s) (06-21-24 @ 21:46),  500 milliGRAM(s) (06-21-24 @ 11:21),  500 milliGRAM(s) (06-20-24 @ 21:02),  500 milliGRAM(s) (06-20-24 @ 10:17),  500 milliGRAM(s) (06-19-24 @ 22:01),  500 milliGRAM(s) (06-19-24 @ 10:32),  500 milliGRAM(s) (06-18-24 @ 21:25),  500 milliGRAM(s) (06-18-24 @ 08:06)      Labs:                        10.9   10.41 )-----------( 210      ( 09 Jul 2024 23:01 )             33.0

## 2024-07-11 NOTE — PROGRESS NOTE ADULT - NSPROGADDITIONALINFOA_GEN_ALL_CORE
28 y.o. P0 at 34w1d w/ PPROM s/p ACS, latency abx.     This morning patient without complaints. On exam ***without fundal tenderness. Stable vital signs. Next set of labs due tomorrow but without leukocytosis prior. This AM, NST ***.    - S/p NICU consult  - Not a candidate for Mg or rescue ACS  - Continue Valtrex   - Plan for IOL on 7/15 at 34w5d if clinically stable; patient amenable to earlier induction if non-reassuring maternal or fetal status 28 y.o. P0 at 34w1d w/ PPROM s/p ACS, latency abx.     This morning patient without complaints. On exam without fundal tenderness. Stable vital signs. Next set of labs due tomorrow but without leukocytosis prior. This AM, , moderate variability, accels, no decels    - S/p NICU consult  - Not a candidate for Mg or rescue ACS  - Continue Valtrex   - Plan for IOL on 7/15 at 34w5d if clinically stable; patient amenable to earlier induction if non-reassuring maternal or fetal status

## 2024-07-11 NOTE — PROGRESS NOTE ADULT - ATTENDING COMMENTS
MFM Attending Note  Patient seen at bedside with MFM team.  Patient states normal fetal movement. Patient denies other signs of labor.  Patient reports clear leakage of fluid.     ICU Vital Signs Last 24 Hrs  T(C): 37.3 (2024 13:36), Max: 37.3 (2024 13:36)  T(F): 99.2 (2024 13:36), Max: 99.2 (2024 13:36)  HR: 83 (2024 13:36) (82 - 97)  BP: 125/63 (2024 13:36) (114/62 - 126/73)  BP(mean): --  ABP: --  ABP(mean): --  RR: 16 (2024 13:36) (16 - 18)  SpO2: 98% (2024 13:36) (96% - 100%)    O2 Parameters below as of 2024 13:36  Patient On (Oxygen Delivery Method): room air    Abdomen: soft, nontender, gravid                          10.9   10.41 )-----------( 210      ( 2024 23:01 )             33.0    a/p:  28 y.o.  at 34w1d  # PPROM  # Primigravid  # GBS bacteruria  # HSV, asymptomatic  # Short cervix, s/p vaginal progesterone  # Palpitations, currently resolved    Patient s/p betamethasone -, s/p latency antibiotics, GBS positive for treatment in labor; no signs of chorioamnionitis  Twice weekly ultrasounds, ultrasound today stable  Continue NST BID  Valtrex continued for suppression, asymptomatic  Palpitation: s/p cardiology evaluation with normal echo    Continued in hospital care secondary to PPROM, Planning delivery at 34 weeks and candidate for expectant management until then if maternal/fetal status remain reassuring.

## 2024-07-11 NOTE — PROGRESS NOTE ADULT - ASSESSMENT
A/P: 27yo  @34w1d transferred from [facility], admitted with PPROM @30wk5d on . Patient is clinically stable with no signs of infection or PTL.    #PPROM  - s/p Mg (-)  - s/p Indocin  - s/p betamethasone (-)  - Candidate for rescue BMZ on   - s/p ampicillin (-)  - s/p amoxicillin (-)   - Afebrile, no signs/symptoms of infection    #HSV  - No lesions on SSE  - Continue Valtrex 500mg BID for ppx    #Palpitations  - Outpatient TTE (): EF 65-70%, V ventricular systolic function hyperdynamic. No valvular disease.  - s/p Holter monitor  - Cardio OB recs: no issues from cardiac perspective expected for delivery/anesthesia    #Fetal wellbeing  - NST BID  - ATU (): vtx, post, JUAN 9.23, MVP 3.66, BPP 8/8  - ATU (): vtx, post, JUAN 3.45, MVP<2, BPP 6/8, EFW 1766g (58%), AC 63%  - GBS+    #Maternal wellbeing  - Contraception: undecided, but declines BS or IUD  - HSQ for DVT ppx  - PNV  - Fe, Vit C  - Regular diet  - s/p Tdap     #Disposition  - Candidate for exp mgmt until delivery at 34-36w unless change in fetal or maternal status  - IOL scheduled 7/15 at 2p (34w5d)    Brenton Ohara, PGY-3

## 2024-07-12 LAB
BASOPHILS # BLD AUTO: 0 K/UL — SIGNIFICANT CHANGE UP (ref 0–0.2)
BASOPHILS NFR BLD AUTO: 0 % — SIGNIFICANT CHANGE UP (ref 0–2)
BLD GP AB SCN SERPL QL: POSITIVE — SIGNIFICANT CHANGE UP
EOSINOPHIL # BLD AUTO: 0.19 K/UL — SIGNIFICANT CHANGE UP (ref 0–0.5)
EOSINOPHIL NFR BLD AUTO: 1.8 % — SIGNIFICANT CHANGE UP (ref 0–6)
GIANT PLATELETS BLD QL SMEAR: PRESENT — SIGNIFICANT CHANGE UP
HCT VFR BLD CALC: 31.1 % — LOW (ref 34.5–45)
HGB BLD-MCNC: 10.8 G/DL — LOW (ref 11.5–15.5)
IANC: 6.45 K/UL — SIGNIFICANT CHANGE UP (ref 1.8–7.4)
LYMPHOCYTES # BLD AUTO: 19.4 % — SIGNIFICANT CHANGE UP (ref 13–44)
LYMPHOCYTES # BLD AUTO: 2 K/UL — SIGNIFICANT CHANGE UP (ref 1–3.3)
MANUAL SMEAR VERIFICATION: SIGNIFICANT CHANGE UP
MCHC RBC-ENTMCNC: 29.3 PG — SIGNIFICANT CHANGE UP (ref 27–34)
MCHC RBC-ENTMCNC: 34.7 GM/DL — SIGNIFICANT CHANGE UP (ref 32–36)
MCV RBC AUTO: 84.5 FL — SIGNIFICANT CHANGE UP (ref 80–100)
METAMYELOCYTES # FLD: 1.8 % — HIGH (ref 0–1)
MONOCYTES # BLD AUTO: 0.73 K/UL — SIGNIFICANT CHANGE UP (ref 0–0.9)
MONOCYTES NFR BLD AUTO: 7.1 % — SIGNIFICANT CHANGE UP (ref 2–14)
MYELOCYTES NFR BLD: 1.8 % — HIGH (ref 0–0)
NEUTROPHILS # BLD AUTO: 6.28 K/UL — SIGNIFICANT CHANGE UP (ref 1.8–7.4)
NEUTROPHILS NFR BLD AUTO: 61 % — SIGNIFICANT CHANGE UP (ref 43–77)
PLAT MORPH BLD: NORMAL — SIGNIFICANT CHANGE UP
PLATELET # BLD AUTO: 201 K/UL — SIGNIFICANT CHANGE UP (ref 150–400)
PLATELET COUNT - ESTIMATE: NORMAL — SIGNIFICANT CHANGE UP
RBC # BLD: 3.68 M/UL — LOW (ref 3.8–5.2)
RBC # FLD: 13.2 % — SIGNIFICANT CHANGE UP (ref 10.3–14.5)
RBC BLD AUTO: ABNORMAL
VARIANT LYMPHS # BLD: 7.1 % — HIGH (ref 0–6)
WBC # BLD: 10.3 K/UL — SIGNIFICANT CHANGE UP (ref 3.8–10.5)
WBC # FLD AUTO: 10.3 K/UL — SIGNIFICANT CHANGE UP (ref 3.8–10.5)

## 2024-07-12 PROCEDURE — 99232 SBSQ HOSP IP/OBS MODERATE 35: CPT

## 2024-07-12 RX ADMIN — Medication 325 MILLIGRAM(S): at 11:20

## 2024-07-12 RX ADMIN — Medication 500 MILLIGRAM(S): at 11:20

## 2024-07-12 RX ADMIN — VALACYCLOVIR HYDROCHLORIDE 500 MILLIGRAM(S): 500 TABLET, FILM COATED ORAL at 11:19

## 2024-07-12 RX ADMIN — HEPARIN SODIUM 5000 UNIT(S): 50 INJECTION, SOLUTION INTRAVENOUS at 21:29

## 2024-07-12 RX ADMIN — Medication 1 APPLICATION(S): at 11:20

## 2024-07-12 RX ADMIN — VALACYCLOVIR HYDROCHLORIDE 500 MILLIGRAM(S): 500 TABLET, FILM COATED ORAL at 21:29

## 2024-07-12 RX ADMIN — HEPARIN SODIUM 5000 UNIT(S): 50 INJECTION, SOLUTION INTRAVENOUS at 11:20

## 2024-07-12 RX ADMIN — Medication 1 TABLET(S): at 11:19

## 2024-07-12 NOTE — PROGRESS NOTE ADULT - ATTENDING COMMENTS
MFM ATTENDIN yo  at 34w2d admitted for PPROM. Plan for IOL on Mon unless otherwise indicated. Currently stable with no e/o IAI, labor, or abruption.     DAVID Abdul MD

## 2024-07-12 NOTE — PROGRESS NOTE ADULT - NSPROGADDITIONALINFOA_GEN_ALL_CORE
28 y.o. P0 at 34w2d admitted for PPROM at 30w s/p latency abx and ACS. This morning without complaints. Stable vitals with intermittent tachycardia, similar to baseline. No fundal tenderness on exam. No leukocytosis on labs today. Reactive tracing this morning with rare contractions.     - S/p NICU consult  - Not a candidate for Mg or rescue ACS  - Continue Valtrex for HSV prophylaxis  - Plan for IOL on 7/15 at 34w5d if clinically stable; patient amenable to earlier induction if non-reassuring maternal or fetal status    Seen and evaluated with Dr. Chantell Hill PGY-5, MFM Fellow

## 2024-07-12 NOTE — PROGRESS NOTE ADULT - SUBJECTIVE AND OBJECTIVE BOX
PGY3 Antepartum Note    Patient seen and examined at bedside, no acute overnight events. No acute complaints. Endorses small trickles of clear fluid.   Pt reports +FM, denies VB, ctx, HA, epigastric pain, blurred vision, CP, SOB, N/V, fevers, and chills.    T(F): 98.4 (05:59)  HR: 95 (05:59)  BP: 108/65 (05:59)  RR: 18 (05:59)    Gen: NAD  Cardio: rrr, s1/s2  Pulm: ca b/l  Abd: gravid, nontender, no palpable contractions  Ext: no edema,  no calf tenderness  SVE: deferred    Medications:  (ADM OVERRIDE): 1 Each (06-18-24 @ 03:50)  (ADM OVERRIDE): 1 Each (06-18-24 @ 03:50)  amoxicillin: 500 milliGRAM(s) (06-24-24 @ 21:01),  500 milliGRAM(s) (06-24-24 @ 13:44),  500 milliGRAM(s) (06-24-24 @ 06:03),  500 milliGRAM(s) (06-23-24 @ 21:31),  500 milliGRAM(s) (06-23-24 @ 13:43),  500 milliGRAM(s) (06-23-24 @ 06:16),  500 milliGRAM(s) (06-22-24 @ 22:13),  500 milliGRAM(s) (06-22-24 @ 13:36),  500 milliGRAM(s) (06-22-24 @ 06:43),  500 milliGRAM(s) (06-21-24 @ 21:45),  500 milliGRAM(s) (06-21-24 @ 14:53),  500 milliGRAM(s) (06-21-24 @ 05:12),  500 milliGRAM(s) (06-20-24 @ 21:03),  500 milliGRAM(s) (06-20-24 @ 15:08),  500 milliGRAM(s) (06-20-24 @ 06:05)  ampicillin  IVPB: 200 mL/Hr (06-18-24 @ 04:05)  ampicillin  IVPB: 200 mL/Hr (06-19-24 @ 22:04),  200 mL/Hr (06-19-24 @ 17:17),  200 mL/Hr (06-19-24 @ 10:31),  200 mL/Hr (06-19-24 @ 03:49),  200 mL/Hr (06-18-24 @ 22:13),  200 mL/Hr (06-18-24 @ 17:33),  200 mL/Hr (06-18-24 @ 09:55)  ascorbic acid: 500 milliGRAM(s) (07-11-24 @ 10:45),  500 milliGRAM(s) (07-10-24 @ 10:45),  500 milliGRAM(s) (07-09-24 @ 10:59),  500 milliGRAM(s) (07-08-24 @ 11:16),  500 milliGRAM(s) (07-07-24 @ 11:08),  500 milliGRAM(s) (07-06-24 @ 10:14),  500 milliGRAM(s) (07-05-24 @ 10:51),  500 milliGRAM(s) (07-04-24 @ 10:40),  500 milliGRAM(s) (07-03-24 @ 10:59),  500 milliGRAM(s) (07-02-24 @ 12:05),  500 milliGRAM(s) (07-01-24 @ 10:32),  500 milliGRAM(s) (06-30-24 @ 10:35),  500 milliGRAM(s) (06-29-24 @ 10:01),  500 milliGRAM(s) (06-28-24 @ 10:12),  500 milliGRAM(s) (06-27-24 @ 12:44),  500 milliGRAM(s) (06-26-24 @ 11:18),  500 milliGRAM(s) (06-25-24 @ 11:09),  500 milliGRAM(s) (06-24-24 @ 10:02),  500 milliGRAM(s) (06-23-24 @ 10:11),  500 milliGRAM(s) (06-22-24 @ 10:02),  500 milliGRAM(s) (06-21-24 @ 11:20),  500 milliGRAM(s) (06-20-24 @ 10:17),  500 milliGRAM(s) (06-19-24 @ 10:59)  betamethasone Injectable: 12 milliGRAM(s) (06-18-24 @ 17:30)  chlorhexidine 2% Cloths: 1 Application(s) (07-11-24 @ 10:50),  1 Application(s) (07-10-24 @ 10:56),  1 Application(s) (07-09-24 @ 12:07),  1 Application(s) (07-08-24 @ 11:18),  1 Application(s) (07-07-24 @ 11:16),  1 Application(s) (07-06-24 @ 10:15),  1 Application(s) (07-05-24 @ 06:58),  1 Application(s) (07-04-24 @ 05:39),  1 Application(s) (07-03-24 @ 05:02),  1 Application(s) (07-02-24 @ 05:29),  1 Application(s) (07-01-24 @ 05:29),  1 Application(s) (06-30-24 @ 06:03),  1 Application(s) (06-28-24 @ 21:00),  1 Application(s) (06-28-24 @ 06:10),  1 Application(s) (06-27-24 @ 12:44),  1 Application(s) (06-26-24 @ 06:27),  1 Application(s) (06-25-24 @ 11:08),  1 Application(s) (06-24-24 @ 06:03),  1 Application(s) (06-23-24 @ 06:16),  1 Application(s) (06-22-24 @ 06:43),  1 Application(s) (06-21-24 @ 11:18),  1 Application(s) (06-20-24 @ 10:16),  1 Application(s) (06-19-24 @ 10:59),  1 Application(s) (06-18-24 @ 11:49)  diphtheria/tetanus/pertussis (acellular) Vaccine (Adacel): 0.5 milliLiter(s) (06-18-24 @ 17:27)  ferrous    sulfate: 325 milliGRAM(s) (07-11-24 @ 10:46),  325 milliGRAM(s) (07-10-24 @ 10:45),  325 milliGRAM(s) (07-09-24 @ 12:05),  325 milliGRAM(s) (07-08-24 @ 11:17),  325 milliGRAM(s) (07-07-24 @ 11:08),  325 milliGRAM(s) (07-06-24 @ 10:15),  325 milliGRAM(s) (07-05-24 @ 10:51),  325 milliGRAM(s) (07-04-24 @ 10:40),  325 milliGRAM(s) (07-03-24 @ 10:59),  325 milliGRAM(s) (07-02-24 @ 12:04),  325 milliGRAM(s) (07-01-24 @ 10:31),  325 milliGRAM(s) (06-30-24 @ 10:35),  325 milliGRAM(s) (06-29-24 @ 10:00),  325 milliGRAM(s) (06-28-24 @ 10:11),  325 milliGRAM(s) (06-27-24 @ 12:44),  325 milliGRAM(s) (06-26-24 @ 11:18),  325 milliGRAM(s) (06-25-24 @ 11:09),  325 milliGRAM(s) (06-24-24 @ 10:02),  325 milliGRAM(s) (06-23-24 @ 10:11),  325 milliGRAM(s) (06-22-24 @ 10:02),  325 milliGRAM(s) (06-21-24 @ 11:21),  325 milliGRAM(s) (06-20-24 @ 10:17),  325 milliGRAM(s) (06-19-24 @ 10:59)  heparin   Injectable: 5000 Unit(s) (07-11-24 @ 21:36),  5000 Unit(s) (07-11-24 @ 10:45),  5000 Unit(s) (07-10-24 @ 22:35),  5000 Unit(s) (07-10-24 @ 10:46),  5000 Unit(s) (07-09-24 @ 22:34),  5000 Unit(s) (07-09-24 @ 10:59),  5000 Unit(s) (07-08-24 @ 21:45),  5000 Unit(s) (07-08-24 @ 11:17),  5000 Unit(s) (07-07-24 @ 22:09),  5000 Unit(s) (07-07-24 @ 11:08),  5000 Unit(s) (07-06-24 @ 22:04),  5000 Unit(s) (07-06-24 @ 10:15),  5000 Unit(s) (07-05-24 @ 21:34),  5000 Unit(s) (07-05-24 @ 10:50),  5000 Unit(s) (07-04-24 @ 21:02),  5000 Unit(s) (07-04-24 @ 10:41),  5000 Unit(s) (07-03-24 @ 21:22),  5000 Unit(s) (07-03-24 @ 10:59),  5000 Unit(s) (07-02-24 @ 21:57),  5000 Unit(s) (07-02-24 @ 12:05),  5000 Unit(s) (07-01-24 @ 21:02),  5000 Unit(s) (07-01-24 @ 10:32),  5000 Unit(s) (06-30-24 @ 21:21),  5000 Unit(s) (06-30-24 @ 10:51),  5000 Unit(s) (06-29-24 @ 22:23),  5000 Unit(s) (06-29-24 @ 10:00),  5000 Unit(s) (06-28-24 @ 21:07),  5000 Unit(s) (06-28-24 @ 10:12),  5000 Unit(s) (06-27-24 @ 22:23),  5000 Unit(s) (06-27-24 @ 12:44),  5000 Unit(s) (06-26-24 @ 22:19),  5000 Unit(s) (06-26-24 @ 11:18),  5000 Unit(s) (06-25-24 @ 22:00),  5000 Unit(s) (06-25-24 @ 11:10),  5000 Unit(s) (06-24-24 @ 21:03),  5000 Unit(s) (06-24-24 @ 10:03),  5000 Unit(s) (06-23-24 @ 21:31),  5000 Unit(s) (06-23-24 @ 10:11),  5000 Unit(s) (06-22-24 @ 22:13),  5000 Unit(s) (06-22-24 @ 10:01),  5000 Unit(s) (06-21-24 @ 21:45),  5000 Unit(s) (06-21-24 @ 11:20),  5000 Unit(s) (06-20-24 @ 21:04),  5000 Unit(s) (06-20-24 @ 10:17),  5000 Unit(s) (06-19-24 @ 22:01),  5000 Unit(s) (06-19-24 @ 10:31),  5000 Unit(s) (06-18-24 @ 22:15),  5000 Unit(s) (06-18-24 @ 11:47)  indomethacin: 25 milliGRAM(s) (06-18-24 @ 09:58),  25 milliGRAM(s) (06-18-24 @ 04:08)  lactated ringers Bolus: 500 mL/Hr (07-07-24 @ 10:16)  lactated ringers.: 50 mL/Hr (06-18-24 @ 09:10)  magnesium sulfate Infusion: 50 mL/Hr (06-18-24 @ 05:00)  prenatal multivitamin: 1 Tablet(s) (07-11-24 @ 10:46),  1 Tablet(s) (07-10-24 @ 10:44),  1 Tablet(s) (07-09-24 @ 10:59),  1 Tablet(s) (07-08-24 @ 11:16),  1 Tablet(s) (07-07-24 @ 11:08),  1 Tablet(s) (07-06-24 @ 10:15),  1 Tablet(s) (07-05-24 @ 10:50),  1 Tablet(s) (07-04-24 @ 10:40),  1 Tablet(s) (07-03-24 @ 10:59),  1 Tablet(s) (07-02-24 @ 12:04),  1 Tablet(s) (07-01-24 @ 10:35),  1 Tablet(s) (06-30-24 @ 10:35),  1 Tablet(s) (06-29-24 @ 10:00),  1 Tablet(s) (06-28-24 @ 10:11),  1 Tablet(s) (06-27-24 @ 12:44),  1 Tablet(s) (06-26-24 @ 11:18),  1 Tablet(s) (06-25-24 @ 11:09),  1 Tablet(s) (06-24-24 @ 10:03),  1 Tablet(s) (06-23-24 @ 10:11),  1 Tablet(s) (06-22-24 @ 10:02),  1 Tablet(s) (06-21-24 @ 11:20),  1 Tablet(s) (06-20-24 @ 10:16),  1 Tablet(s) (06-19-24 @ 10:59)  valACYclovir: 500 milliGRAM(s) (07-11-24 @ 21:36),  500 milliGRAM(s) (07-11-24 @ 10:46),  500 milliGRAM(s) (07-10-24 @ 22:35),  500 milliGRAM(s) (07-10-24 @ 10:45),  500 milliGRAM(s) (07-09-24 @ 22:34),  500 milliGRAM(s) (07-09-24 @ 10:59),  500 milliGRAM(s) (07-08-24 @ 21:45),  500 milliGRAM(s) (07-08-24 @ 11:16),  500 milliGRAM(s) (07-07-24 @ 22:09),  500 milliGRAM(s) (07-07-24 @ 11:08),  500 milliGRAM(s) (07-06-24 @ 22:06),  500 milliGRAM(s) (07-06-24 @ 10:14),  500 milliGRAM(s) (07-05-24 @ 21:34),  500 milliGRAM(s) (07-05-24 @ 10:50),  500 milliGRAM(s) (07-04-24 @ 21:01),  500 milliGRAM(s) (07-04-24 @ 10:41),  500 milliGRAM(s) (07-03-24 @ 21:22),  500 milliGRAM(s) (07-03-24 @ 10:59),  500 milliGRAM(s) (07-02-24 @ 21:57),  500 milliGRAM(s) (07-02-24 @ 12:04),  500 milliGRAM(s) (07-01-24 @ 21:02),  500 milliGRAM(s) (07-01-24 @ 10:31),  500 milliGRAM(s) (06-30-24 @ 21:20),  500 milliGRAM(s) (06-30-24 @ 10:35),  500 milliGRAM(s) (06-29-24 @ 22:23),  500 milliGRAM(s) (06-29-24 @ 10:01),  500 milliGRAM(s) (06-28-24 @ 21:06),  500 milliGRAM(s) (06-28-24 @ 10:11),  500 milliGRAM(s) (06-27-24 @ 22:22),  500 milliGRAM(s) (06-27-24 @ 12:44),  500 milliGRAM(s) (06-26-24 @ 22:19),  500 milliGRAM(s) (06-26-24 @ 11:17),  500 milliGRAM(s) (06-25-24 @ 21:48),  500 milliGRAM(s) (06-25-24 @ 11:09),  500 milliGRAM(s) (06-24-24 @ 21:01),  500 milliGRAM(s) (06-24-24 @ 10:02),  500 milliGRAM(s) (06-23-24 @ 21:32),  500 milliGRAM(s) (06-23-24 @ 10:11),  500 milliGRAM(s) (06-22-24 @ 22:13),  500 milliGRAM(s) (06-22-24 @ 10:01),  500 milliGRAM(s) (06-21-24 @ 21:46),  500 milliGRAM(s) (06-21-24 @ 11:21),  500 milliGRAM(s) (06-20-24 @ 21:02),  500 milliGRAM(s) (06-20-24 @ 10:17),  500 milliGRAM(s) (06-19-24 @ 22:01),  500 milliGRAM(s) (06-19-24 @ 10:32),  500 milliGRAM(s) (06-18-24 @ 21:25),  500 milliGRAM(s) (06-18-24 @ 08:06)      Labs:                        10.8   10.30 )-----------( 201 ( 12 Jul 2024 06:31 )             31.1

## 2024-07-12 NOTE — PROGRESS NOTE ADULT - ASSESSMENT
A/P: 27yo  @34w2d transferred from [facility], admitted with PPROM @30w5d on . Patient is clinically stable with no signs of infection or PTL.    #PPROM  - s/p Mg (-)  - s/p Indocin  - s/p betamethasone (-)  - Candidate for rescue BMZ on   - s/p ampicillin (-)  - s/p amoxicillin (-)   - Afebrile, no signs/symptoms of infection    #HSV  - No lesions on SSE  - Continue Valtrex 500mg BID for ppx    #Palpitations  - Outpatient TTE (): EF 65-70%, V ventricular systolic function hyperdynamic. No valvular disease.  - s/p Holter monitor  - Cardio OB recs: no issues from cardiac perspective expected for delivery/anesthesia    #Fetal wellbeing  - NST BID  - ATU (): vtx, post, JUAN 9.23, MVP 3.66, BPP 8/8  - ATU (): vtx, post, JUAN 3.45, MVP<2, BPP 6/8, EFW 1766g (58%), AC 63%  - GBS+    #Maternal wellbeing  - Contraception: undecided, but declines BS or IUD  - HSQ for DVT ppx  - PNV  - Fe, Vit C  - Regular diet  - s/p Tdap     #Disposition  - Candidate for exp mgmt until delivery at 34-36w unless change in fetal or maternal status  - IOL scheduled 7/15 at 2p (34w5d)    Brenton Ohara, PGY-3

## 2024-07-13 PROCEDURE — 99232 SBSQ HOSP IP/OBS MODERATE 35: CPT

## 2024-07-13 RX ADMIN — HEPARIN SODIUM 5000 UNIT(S): 50 INJECTION, SOLUTION INTRAVENOUS at 21:43

## 2024-07-13 RX ADMIN — Medication 1 TABLET(S): at 10:28

## 2024-07-13 RX ADMIN — Medication 325 MILLIGRAM(S): at 10:28

## 2024-07-13 RX ADMIN — Medication 500 MILLIGRAM(S): at 10:28

## 2024-07-13 RX ADMIN — VALACYCLOVIR HYDROCHLORIDE 500 MILLIGRAM(S): 500 TABLET, FILM COATED ORAL at 10:28

## 2024-07-13 RX ADMIN — Medication 1 APPLICATION(S): at 10:28

## 2024-07-13 RX ADMIN — VALACYCLOVIR HYDROCHLORIDE 500 MILLIGRAM(S): 500 TABLET, FILM COATED ORAL at 21:42

## 2024-07-13 RX ADMIN — HEPARIN SODIUM 5000 UNIT(S): 50 INJECTION, SOLUTION INTRAVENOUS at 10:28

## 2024-07-13 NOTE — PROGRESS NOTE ADULT - ASSESSMENT
A/P: 27yo  @34w3d transferred from [facility], admitted with PPROM @30w5d on . Patient is clinically stable with no signs of infection or PTL.    #PPROM  - s/p Mg (-)  - s/p Indocin  - s/p betamethasone (-)  - Candidate for rescue BMZ on   - s/p ampicillin (-)  - s/p amoxicillin (-)   - Afebrile, no signs/symptoms of infection    #HSV  - No lesions on SSE  - Continue Valtrex 500mg BID for ppx    #Palpitations  - Outpatient TTE (): EF 65-70%, V ventricular systolic function hyperdynamic. No valvular disease.  - s/p Holter monitor  - Cardio OB recs: no issues from cardiac perspective expected for delivery/anesthesia    #Fetal wellbeing  - NST BID  - ATU (): vtx, post, JUAN 9.23, MVP 3.66, BPP 8/8  - ATU (): vtx, post, JUAN 3.45, MVP<2, BPP 6/8, EFW 1766g (58%), AC 63%  - GBS+    #Maternal wellbeing  - Contraception: undecided, but declines BS or IUD  - HSQ for DVT ppx  - PNV  - Fe, Vit C  - Regular diet  - s/p Tdap     #Disposition  - Candidate for exp mgmt until delivery at 34-36w unless change in fetal or maternal status  - IOL scheduled 7/15 at 2p (34w5d)    Rosa Dennison PGY3

## 2024-07-13 NOTE — PROGRESS NOTE ADULT - SUBJECTIVE AND OBJECTIVE BOX
PGY 3 Antepartum Note    Patient seen and examined at bedside in NAD. Denies any CTX or VB.  Reports good fetal movement. Pt endorses small trickles of clear fluid. Denies headache, epigastric pain, blurred vision, chest pain, SOB, nausea, vomiting, fevers, chills.     T(F): --  HR: --  BP: --  RR: --      Gen: NAD  Cardio: rrr, s1/s2  Pulm: ca b/l  Abd: gravid, nontender, no palpable contractions  Ext: no edema,  no calf enderness  SVE: deferred    Medications:  (ADM OVERRIDE): 1 Each (06-18-24 @ 03:50)  (ADM OVERRIDE): 1 Each (06-18-24 @ 03:50)  amoxicillin: 500 milliGRAM(s) (06-24-24 @ 21:01),  500 milliGRAM(s) (06-24-24 @ 13:44),  500 milliGRAM(s) (06-24-24 @ 06:03),  500 milliGRAM(s) (06-23-24 @ 21:31),  500 milliGRAM(s) (06-23-24 @ 13:43),  500 milliGRAM(s) (06-23-24 @ 06:16),  500 milliGRAM(s) (06-22-24 @ 22:13),  500 milliGRAM(s) (06-22-24 @ 13:36),  500 milliGRAM(s) (06-22-24 @ 06:43),  500 milliGRAM(s) (06-21-24 @ 21:45),  500 milliGRAM(s) (06-21-24 @ 14:53),  500 milliGRAM(s) (06-21-24 @ 05:12),  500 milliGRAM(s) (06-20-24 @ 21:03),  500 milliGRAM(s) (06-20-24 @ 15:08),  500 milliGRAM(s) (06-20-24 @ 06:05)  ampicillin  IVPB: 200 mL/Hr (06-19-24 @ 22:04),  200 mL/Hr (06-19-24 @ 17:17),  200 mL/Hr (06-19-24 @ 10:31),  200 mL/Hr (06-19-24 @ 03:49),  200 mL/Hr (06-18-24 @ 22:13),  200 mL/Hr (06-18-24 @ 17:33),  200 mL/Hr (06-18-24 @ 09:55)  ampicillin  IVPB: 200 mL/Hr (06-18-24 @ 04:05)  ascorbic acid: 500 milliGRAM(s) (07-12-24 @ 11:20),  500 milliGRAM(s) (07-11-24 @ 10:45),  500 milliGRAM(s) (07-10-24 @ 10:45),  500 milliGRAM(s) (07-09-24 @ 10:59),  500 milliGRAM(s) (07-08-24 @ 11:16),  500 milliGRAM(s) (07-07-24 @ 11:08),  500 milliGRAM(s) (07-06-24 @ 10:14),  500 milliGRAM(s) (07-05-24 @ 10:51),  500 milliGRAM(s) (07-04-24 @ 10:40),  500 milliGRAM(s) (07-03-24 @ 10:59),  500 milliGRAM(s) (07-02-24 @ 12:05),  500 milliGRAM(s) (07-01-24 @ 10:32),  500 milliGRAM(s) (06-30-24 @ 10:35),  500 milliGRAM(s) (06-29-24 @ 10:01),  500 milliGRAM(s) (06-28-24 @ 10:12),  500 milliGRAM(s) (06-27-24 @ 12:44),  500 milliGRAM(s) (06-26-24 @ 11:18),  500 milliGRAM(s) (06-25-24 @ 11:09),  500 milliGRAM(s) (06-24-24 @ 10:02),  500 milliGRAM(s) (06-23-24 @ 10:11),  500 milliGRAM(s) (06-22-24 @ 10:02),  500 milliGRAM(s) (06-21-24 @ 11:20),  500 milliGRAM(s) (06-20-24 @ 10:17),  500 milliGRAM(s) (06-19-24 @ 10:59)  betamethasone Injectable: 12 milliGRAM(s) (06-18-24 @ 17:30)  chlorhexidine 2% Cloths: 1 Application(s) (07-12-24 @ 11:20),  1 Application(s) (07-11-24 @ 10:50),  1 Application(s) (07-10-24 @ 10:56),  1 Application(s) (07-09-24 @ 12:07),  1 Application(s) (07-08-24 @ 11:18),  1 Application(s) (07-07-24 @ 11:16),  1 Application(s) (07-06-24 @ 10:15),  1 Application(s) (07-05-24 @ 06:58),  1 Application(s) (07-04-24 @ 05:39),  1 Application(s) (07-03-24 @ 05:02),  1 Application(s) (07-02-24 @ 05:29),  1 Application(s) (07-01-24 @ 05:29),  1 Application(s) (06-30-24 @ 06:03),  1 Application(s) (06-28-24 @ 21:00),  1 Application(s) (06-28-24 @ 06:10),  1 Application(s) (06-27-24 @ 12:44),  1 Application(s) (06-26-24 @ 06:27),  1 Application(s) (06-25-24 @ 11:08),  1 Application(s) (06-24-24 @ 06:03),  1 Application(s) (06-23-24 @ 06:16),  1 Application(s) (06-22-24 @ 06:43),  1 Application(s) (06-21-24 @ 11:18),  1 Application(s) (06-20-24 @ 10:16),  1 Application(s) (06-19-24 @ 10:59),  1 Application(s) (06-18-24 @ 11:49)  diphtheria/tetanus/pertussis (acellular) Vaccine (Adacel): 0.5 milliLiter(s) (06-18-24 @ 17:27)  ferrous    sulfate: 325 milliGRAM(s) (07-12-24 @ 11:20),  325 milliGRAM(s) (07-11-24 @ 10:46),  325 milliGRAM(s) (07-10-24 @ 10:45),  325 milliGRAM(s) (07-09-24 @ 12:05),  325 milliGRAM(s) (07-08-24 @ 11:17),  325 milliGRAM(s) (07-07-24 @ 11:08),  325 milliGRAM(s) (07-06-24 @ 10:15),  325 milliGRAM(s) (07-05-24 @ 10:51),  325 milliGRAM(s) (07-04-24 @ 10:40),  325 milliGRAM(s) (07-03-24 @ 10:59),  325 milliGRAM(s) (07-02-24 @ 12:04),  325 milliGRAM(s) (07-01-24 @ 10:31),  325 milliGRAM(s) (06-30-24 @ 10:35),  325 milliGRAM(s) (06-29-24 @ 10:00),  325 milliGRAM(s) (06-28-24 @ 10:11),  325 milliGRAM(s) (06-27-24 @ 12:44),  325 milliGRAM(s) (06-26-24 @ 11:18),  325 milliGRAM(s) (06-25-24 @ 11:09),  325 milliGRAM(s) (06-24-24 @ 10:02),  325 milliGRAM(s) (06-23-24 @ 10:11),  325 milliGRAM(s) (06-22-24 @ 10:02),  325 milliGRAM(s) (06-21-24 @ 11:21),  325 milliGRAM(s) (06-20-24 @ 10:17),  325 milliGRAM(s) (06-19-24 @ 10:59)  heparin   Injectable: 5000 Unit(s) (07-12-24 @ 21:29),  5000 Unit(s) (07-12-24 @ 11:20),  5000 Unit(s) (07-11-24 @ 21:36),  5000 Unit(s) (07-11-24 @ 10:45),  5000 Unit(s) (07-10-24 @ 22:35),  5000 Unit(s) (07-10-24 @ 10:46),  5000 Unit(s) (07-09-24 @ 22:34),  5000 Unit(s) (07-09-24 @ 10:59),  5000 Unit(s) (07-08-24 @ 21:45),  5000 Unit(s) (07-08-24 @ 11:17),  5000 Unit(s) (07-07-24 @ 22:09),  5000 Unit(s) (07-07-24 @ 11:08),  5000 Unit(s) (07-06-24 @ 22:04),  5000 Unit(s) (07-06-24 @ 10:15),  5000 Unit(s) (07-05-24 @ 21:34),  5000 Unit(s) (07-05-24 @ 10:50),  5000 Unit(s) (07-04-24 @ 21:02),  5000 Unit(s) (07-04-24 @ 10:41),  5000 Unit(s) (07-03-24 @ 21:22),  5000 Unit(s) (07-03-24 @ 10:59),  5000 Unit(s) (07-02-24 @ 21:57),  5000 Unit(s) (07-02-24 @ 12:05),  5000 Unit(s) (07-01-24 @ 21:02),  5000 Unit(s) (07-01-24 @ 10:32),  5000 Unit(s) (06-30-24 @ 21:21),  5000 Unit(s) (06-30-24 @ 10:51),  5000 Unit(s) (06-29-24 @ 22:23),  5000 Unit(s) (06-29-24 @ 10:00),  5000 Unit(s) (06-28-24 @ 21:07),  5000 Unit(s) (06-28-24 @ 10:12),  5000 Unit(s) (06-27-24 @ 22:23),  5000 Unit(s) (06-27-24 @ 12:44),  5000 Unit(s) (06-26-24 @ 22:19),  5000 Unit(s) (06-26-24 @ 11:18),  5000 Unit(s) (06-25-24 @ 22:00),  5000 Unit(s) (06-25-24 @ 11:10),  5000 Unit(s) (06-24-24 @ 21:03),  5000 Unit(s) (06-24-24 @ 10:03),  5000 Unit(s) (06-23-24 @ 21:31),  5000 Unit(s) (06-23-24 @ 10:11),  5000 Unit(s) (06-22-24 @ 22:13),  5000 Unit(s) (06-22-24 @ 10:01),  5000 Unit(s) (06-21-24 @ 21:45),  5000 Unit(s) (06-21-24 @ 11:20),  5000 Unit(s) (06-20-24 @ 21:04),  5000 Unit(s) (06-20-24 @ 10:17),  5000 Unit(s) (06-19-24 @ 22:01),  5000 Unit(s) (06-19-24 @ 10:31),  5000 Unit(s) (06-18-24 @ 22:15),  5000 Unit(s) (06-18-24 @ 11:47)  indomethacin: 25 milliGRAM(s) (06-18-24 @ 09:58),  25 milliGRAM(s) (06-18-24 @ 04:08)  lactated ringers Bolus: 500 mL/Hr (07-07-24 @ 10:16)  lactated ringers.: 50 mL/Hr (06-18-24 @ 09:10)  magnesium sulfate Infusion: 50 mL/Hr (06-18-24 @ 05:00)  prenatal multivitamin: 1 Tablet(s) (07-12-24 @ 11:19),  1 Tablet(s) (07-11-24 @ 10:46),  1 Tablet(s) (07-10-24 @ 10:44),  1 Tablet(s) (07-09-24 @ 10:59),  1 Tablet(s) (07-08-24 @ 11:16),  1 Tablet(s) (07-07-24 @ 11:08),  1 Tablet(s) (07-06-24 @ 10:15),  1 Tablet(s) (07-05-24 @ 10:50),  1 Tablet(s) (07-04-24 @ 10:40),  1 Tablet(s) (07-03-24 @ 10:59),  1 Tablet(s) (07-02-24 @ 12:04),  1 Tablet(s) (07-01-24 @ 10:35),  1 Tablet(s) (06-30-24 @ 10:35),  1 Tablet(s) (06-29-24 @ 10:00),  1 Tablet(s) (06-28-24 @ 10:11),  1 Tablet(s) (06-27-24 @ 12:44),  1 Tablet(s) (06-26-24 @ 11:18),  1 Tablet(s) (06-25-24 @ 11:09),  1 Tablet(s) (06-24-24 @ 10:03),  1 Tablet(s) (06-23-24 @ 10:11),  1 Tablet(s) (06-22-24 @ 10:02),  1 Tablet(s) (06-21-24 @ 11:20),  1 Tablet(s) (06-20-24 @ 10:16),  1 Tablet(s) (06-19-24 @ 10:59)  valACYclovir: 500 milliGRAM(s) (07-12-24 @ 21:29),  500 milliGRAM(s) (07-12-24 @ 11:19),  500 milliGRAM(s) (07-11-24 @ 21:36),  500 milliGRAM(s) (07-11-24 @ 10:46),  500 milliGRAM(s) (07-10-24 @ 22:35),  500 milliGRAM(s) (07-10-24 @ 10:45),  500 milliGRAM(s) (07-09-24 @ 22:34),  500 milliGRAM(s) (07-09-24 @ 10:59),  500 milliGRAM(s) (07-08-24 @ 21:45),  500 milliGRAM(s) (07-08-24 @ 11:16),  500 milliGRAM(s) (07-07-24 @ 22:09),  500 milliGRAM(s) (07-07-24 @ 11:08),  500 milliGRAM(s) (07-06-24 @ 22:06),  500 milliGRAM(s) (07-06-24 @ 10:14),  500 milliGRAM(s) (07-05-24 @ 21:34),  500 milliGRAM(s) (07-05-24 @ 10:50),  500 milliGRAM(s) (07-04-24 @ 21:01),  500 milliGRAM(s) (07-04-24 @ 10:41),  500 milliGRAM(s) (07-03-24 @ 21:22),  500 milliGRAM(s) (07-03-24 @ 10:59),  500 milliGRAM(s) (07-02-24 @ 21:57),  500 milliGRAM(s) (07-02-24 @ 12:04),  500 milliGRAM(s) (07-01-24 @ 21:02),  500 milliGRAM(s) (07-01-24 @ 10:31),  500 milliGRAM(s) (06-30-24 @ 21:20),  500 milliGRAM(s) (06-30-24 @ 10:35),  500 milliGRAM(s) (06-29-24 @ 22:23),  500 milliGRAM(s) (06-29-24 @ 10:01),  500 milliGRAM(s) (06-28-24 @ 21:06),  500 milliGRAM(s) (06-28-24 @ 10:11),  500 milliGRAM(s) (06-27-24 @ 22:22),  500 milliGRAM(s) (06-27-24 @ 12:44),  500 milliGRAM(s) (06-26-24 @ 22:19),  500 milliGRAM(s) (06-26-24 @ 11:17),  500 milliGRAM(s) (06-25-24 @ 21:48),  500 milliGRAM(s) (06-25-24 @ 11:09),  500 milliGRAM(s) (06-24-24 @ 21:01),  500 milliGRAM(s) (06-24-24 @ 10:02),  500 milliGRAM(s) (06-23-24 @ 21:32),  500 milliGRAM(s) (06-23-24 @ 10:11),  500 milliGRAM(s) (06-22-24 @ 22:13),  500 milliGRAM(s) (06-22-24 @ 10:01),  500 milliGRAM(s) (06-21-24 @ 21:46),  500 milliGRAM(s) (06-21-24 @ 11:21),  500 milliGRAM(s) (06-20-24 @ 21:02),  500 milliGRAM(s) (06-20-24 @ 10:17),  500 milliGRAM(s) (06-19-24 @ 22:01),  500 milliGRAM(s) (06-19-24 @ 10:32),  500 milliGRAM(s) (06-18-24 @ 21:25),  500 milliGRAM(s) (06-18-24 @ 08:06)      Labs:                        10.8   10.30 )-----------( 201 ( 12 Jul 2024 06:31 )             31.1

## 2024-07-13 NOTE — PROGRESS NOTE ADULT - ATTENDING COMMENTS
MFM ATTENDIN yo  at 34w3d admitted for PPROM. Plan for IOL on Mon unless otherwise indicated. Currently stable with no e/o IAI, labor, or abruption.     DAVID Abdul MD

## 2024-07-14 PROCEDURE — 99232 SBSQ HOSP IP/OBS MODERATE 35: CPT

## 2024-07-14 RX ADMIN — VALACYCLOVIR HYDROCHLORIDE 500 MILLIGRAM(S): 500 TABLET, FILM COATED ORAL at 22:49

## 2024-07-14 RX ADMIN — Medication 325 MILLIGRAM(S): at 10:44

## 2024-07-14 RX ADMIN — VALACYCLOVIR HYDROCHLORIDE 500 MILLIGRAM(S): 500 TABLET, FILM COATED ORAL at 10:44

## 2024-07-14 RX ADMIN — Medication 1 TABLET(S): at 10:44

## 2024-07-14 RX ADMIN — HEPARIN SODIUM 5000 UNIT(S): 50 INJECTION, SOLUTION INTRAVENOUS at 22:50

## 2024-07-14 RX ADMIN — Medication 500 MILLIGRAM(S): at 10:44

## 2024-07-14 RX ADMIN — HEPARIN SODIUM 5000 UNIT(S): 50 INJECTION, SOLUTION INTRAVENOUS at 10:44

## 2024-07-14 RX ADMIN — Medication 1 APPLICATION(S): at 10:45

## 2024-07-14 NOTE — PROGRESS NOTE ADULT - NSPROGADDITIONALINFOA_GEN_ALL_CORE
GLENROY Fellow Addendum   27yo P0 at 34w4d admitted for PPROM at 30w0d s/p latency abx and ACS. This morning without complaints, no signs or symptoms of intra-amniotic infection or  labor at this time. Fetal status reactive and reassuring. Fetus AGA. Patient has been seen by NICU, she is no longer a candidate for rescue ACS or Mag Sulfate. Currently on Valtrex for hx HSV. Patient is for IOL tomorrow as scheduled, or sooner if there is a change in maternal or fetal status.   Patient seen and evaluated with GLENROY Fuchs Attending  GLENROY Odom Fellow same

## 2024-07-14 NOTE — PROGRESS NOTE ADULT - ASSESSMENT
A/P: 27yo  @34w4d transferred from [facility], admitted with PPROM @30w5d on . Patient is clinically stable with no signs of infection or PTL.    #PPROM  - s/p Mg (-)  - s/p Indocin  - s/p betamethasone (-)  - Candidate for rescue BMZ on   - s/p ampicillin (-)  - s/p amoxicillin (-)   - Afebrile, no signs/symptoms of infection    #HSV  - No lesions on SSE  - Continue Valtrex 500mg BID for ppx    #Palpitations  - Outpatient TTE (): EF 65-70%, V ventricular systolic function hyperdynamic. No valvular disease.  - s/p Holter monitor  - Cardio OB recs: no issues from cardiac perspective expected for delivery/anesthesia    #Fetal wellbeing  - NST BID  - ATU (): vtx, post, JUAN 9.23, MVP 3.66, BPP 8/8  - ATU (): vtx, post, JUAN 3.45, MVP<2, BPP 6/8, EFW 1766g (58%), AC 63%  - GBS+    #Maternal wellbeing  - Contraception: undecided, but declines BS or IUD  - HSQ for DVT ppx  - PNV  - Fe, Vit C  - Regular diet  - s/p Tdap     #Disposition  - Candidate for exp mgmt until delivery at 34-36w unless change in fetal or maternal status  - IOL scheduled 7/15 at 2p (34w5d)    Brenton Ohara, PGY-3

## 2024-07-14 NOTE — PROGRESS NOTE ADULT - SUBJECTIVE AND OBJECTIVE BOX
PGY3 Antepartum Note    Patient seen and examined at bedside in NAD. Denies any CTX or VB.  Reports good fetal movement. Pt endorses small trickles of clear fluid. Denies headache, epigastric pain, blurred vision, chest pain, SOB, nausea, vomiting, fevers, chills.     T(F): 98.5 (05:34)  HR: 94 (05:34)  BP: 113/59 (05:34)  RR: 18 (05:34)    Gen: NAD  Cardio: rrr, s1/s2  Pulm: ca b/l  Abd: gravid, nontender, no palpable contractions  Ext: no edema,  no calf tenderness  SVE: deferred    Medications:  (ADM OVERRIDE): 1 Each (06-18-24 @ 03:50)  (ADM OVERRIDE): 1 Each (06-18-24 @ 03:50)  amoxicillin: 500 milliGRAM(s) (06-24-24 @ 21:01),  500 milliGRAM(s) (06-24-24 @ 13:44),  500 milliGRAM(s) (06-24-24 @ 06:03),  500 milliGRAM(s) (06-23-24 @ 21:31),  500 milliGRAM(s) (06-23-24 @ 13:43),  500 milliGRAM(s) (06-23-24 @ 06:16),  500 milliGRAM(s) (06-22-24 @ 22:13),  500 milliGRAM(s) (06-22-24 @ 13:36),  500 milliGRAM(s) (06-22-24 @ 06:43),  500 milliGRAM(s) (06-21-24 @ 21:45),  500 milliGRAM(s) (06-21-24 @ 14:53),  500 milliGRAM(s) (06-21-24 @ 05:12),  500 milliGRAM(s) (06-20-24 @ 21:03),  500 milliGRAM(s) (06-20-24 @ 15:08),  500 milliGRAM(s) (06-20-24 @ 06:05)  ampicillin  IVPB: 200 mL/Hr (06-19-24 @ 22:04),  200 mL/Hr (06-19-24 @ 17:17),  200 mL/Hr (06-19-24 @ 10:31),  200 mL/Hr (06-19-24 @ 03:49),  200 mL/Hr (06-18-24 @ 22:13),  200 mL/Hr (06-18-24 @ 17:33),  200 mL/Hr (06-18-24 @ 09:55)  ampicillin  IVPB: 200 mL/Hr (06-18-24 @ 04:05)  ascorbic acid: 500 milliGRAM(s) (07-13-24 @ 10:28),  500 milliGRAM(s) (07-12-24 @ 11:20),  500 milliGRAM(s) (07-11-24 @ 10:45),  500 milliGRAM(s) (07-10-24 @ 10:45),  500 milliGRAM(s) (07-09-24 @ 10:59),  500 milliGRAM(s) (07-08-24 @ 11:16),  500 milliGRAM(s) (07-07-24 @ 11:08),  500 milliGRAM(s) (07-06-24 @ 10:14),  500 milliGRAM(s) (07-05-24 @ 10:51),  500 milliGRAM(s) (07-04-24 @ 10:40),  500 milliGRAM(s) (07-03-24 @ 10:59),  500 milliGRAM(s) (07-02-24 @ 12:05),  500 milliGRAM(s) (07-01-24 @ 10:32),  500 milliGRAM(s) (06-30-24 @ 10:35),  500 milliGRAM(s) (06-29-24 @ 10:01),  500 milliGRAM(s) (06-28-24 @ 10:12),  500 milliGRAM(s) (06-27-24 @ 12:44),  500 milliGRAM(s) (06-26-24 @ 11:18),  500 milliGRAM(s) (06-25-24 @ 11:09),  500 milliGRAM(s) (06-24-24 @ 10:02),  500 milliGRAM(s) (06-23-24 @ 10:11),  500 milliGRAM(s) (06-22-24 @ 10:02),  500 milliGRAM(s) (06-21-24 @ 11:20),  500 milliGRAM(s) (06-20-24 @ 10:17),  500 milliGRAM(s) (06-19-24 @ 10:59)  betamethasone Injectable: 12 milliGRAM(s) (06-18-24 @ 17:30)  chlorhexidine 2% Cloths: 1 Application(s) (07-13-24 @ 10:28),  1 Application(s) (07-12-24 @ 11:20),  1 Application(s) (07-11-24 @ 10:50),  1 Application(s) (07-10-24 @ 10:56),  1 Application(s) (07-09-24 @ 12:07),  1 Application(s) (07-08-24 @ 11:18),  1 Application(s) (07-07-24 @ 11:16),  1 Application(s) (07-06-24 @ 10:15),  1 Application(s) (07-05-24 @ 06:58),  1 Application(s) (07-04-24 @ 05:39),  1 Application(s) (07-03-24 @ 05:02),  1 Application(s) (07-02-24 @ 05:29),  1 Application(s) (07-01-24 @ 05:29),  1 Application(s) (06-30-24 @ 06:03),  1 Application(s) (06-28-24 @ 21:00),  1 Application(s) (06-28-24 @ 06:10),  1 Application(s) (06-27-24 @ 12:44),  1 Application(s) (06-26-24 @ 06:27),  1 Application(s) (06-25-24 @ 11:08),  1 Application(s) (06-24-24 @ 06:03),  1 Application(s) (06-23-24 @ 06:16),  1 Application(s) (06-22-24 @ 06:43),  1 Application(s) (06-21-24 @ 11:18),  1 Application(s) (06-20-24 @ 10:16),  1 Application(s) (06-19-24 @ 10:59),  1 Application(s) (06-18-24 @ 11:49)  diphtheria/tetanus/pertussis (acellular) Vaccine (Adacel): 0.5 milliLiter(s) (06-18-24 @ 17:27)  ferrous    sulfate: 325 milliGRAM(s) (07-13-24 @ 10:28),  325 milliGRAM(s) (07-12-24 @ 11:20),  325 milliGRAM(s) (07-11-24 @ 10:46),  325 milliGRAM(s) (07-10-24 @ 10:45),  325 milliGRAM(s) (07-09-24 @ 12:05),  325 milliGRAM(s) (07-08-24 @ 11:17),  325 milliGRAM(s) (07-07-24 @ 11:08),  325 milliGRAM(s) (07-06-24 @ 10:15),  325 milliGRAM(s) (07-05-24 @ 10:51),  325 milliGRAM(s) (07-04-24 @ 10:40),  325 milliGRAM(s) (07-03-24 @ 10:59),  325 milliGRAM(s) (07-02-24 @ 12:04),  325 milliGRAM(s) (07-01-24 @ 10:31),  325 milliGRAM(s) (06-30-24 @ 10:35),  325 milliGRAM(s) (06-29-24 @ 10:00),  325 milliGRAM(s) (06-28-24 @ 10:11),  325 milliGRAM(s) (06-27-24 @ 12:44),  325 milliGRAM(s) (06-26-24 @ 11:18),  325 milliGRAM(s) (06-25-24 @ 11:09),  325 milliGRAM(s) (06-24-24 @ 10:02),  325 milliGRAM(s) (06-23-24 @ 10:11),  325 milliGRAM(s) (06-22-24 @ 10:02),  325 milliGRAM(s) (06-21-24 @ 11:21),  325 milliGRAM(s) (06-20-24 @ 10:17),  325 milliGRAM(s) (06-19-24 @ 10:59)  heparin   Injectable: 5000 Unit(s) (07-13-24 @ 21:43),  5000 Unit(s) (07-13-24 @ 10:28),  5000 Unit(s) (07-12-24 @ 21:29),  5000 Unit(s) (07-12-24 @ 11:20),  5000 Unit(s) (07-11-24 @ 21:36),  5000 Unit(s) (07-11-24 @ 10:45),  5000 Unit(s) (07-10-24 @ 22:35),  5000 Unit(s) (07-10-24 @ 10:46),  5000 Unit(s) (07-09-24 @ 22:34),  5000 Unit(s) (07-09-24 @ 10:59),  5000 Unit(s) (07-08-24 @ 21:45),  5000 Unit(s) (07-08-24 @ 11:17),  5000 Unit(s) (07-07-24 @ 22:09),  5000 Unit(s) (07-07-24 @ 11:08),  5000 Unit(s) (07-06-24 @ 22:04),  5000 Unit(s) (07-06-24 @ 10:15),  5000 Unit(s) (07-05-24 @ 21:34),  5000 Unit(s) (07-05-24 @ 10:50),  5000 Unit(s) (07-04-24 @ 21:02),  5000 Unit(s) (07-04-24 @ 10:41),  5000 Unit(s) (07-03-24 @ 21:22),  5000 Unit(s) (07-03-24 @ 10:59),  5000 Unit(s) (07-02-24 @ 21:57),  5000 Unit(s) (07-02-24 @ 12:05),  5000 Unit(s) (07-01-24 @ 21:02),  5000 Unit(s) (07-01-24 @ 10:32),  5000 Unit(s) (06-30-24 @ 21:21),  5000 Unit(s) (06-30-24 @ 10:51),  5000 Unit(s) (06-29-24 @ 22:23),  5000 Unit(s) (06-29-24 @ 10:00),  5000 Unit(s) (06-28-24 @ 21:07),  5000 Unit(s) (06-28-24 @ 10:12),  5000 Unit(s) (06-27-24 @ 22:23),  5000 Unit(s) (06-27-24 @ 12:44),  5000 Unit(s) (06-26-24 @ 22:19),  5000 Unit(s) (06-26-24 @ 11:18),  5000 Unit(s) (06-25-24 @ 22:00),  5000 Unit(s) (06-25-24 @ 11:10),  5000 Unit(s) (06-24-24 @ 21:03),  5000 Unit(s) (06-24-24 @ 10:03),  5000 Unit(s) (06-23-24 @ 21:31),  5000 Unit(s) (06-23-24 @ 10:11),  5000 Unit(s) (06-22-24 @ 22:13),  5000 Unit(s) (06-22-24 @ 10:01),  5000 Unit(s) (06-21-24 @ 21:45),  5000 Unit(s) (06-21-24 @ 11:20),  5000 Unit(s) (06-20-24 @ 21:04),  5000 Unit(s) (06-20-24 @ 10:17),  5000 Unit(s) (06-19-24 @ 22:01),  5000 Unit(s) (06-19-24 @ 10:31),  5000 Unit(s) (06-18-24 @ 22:15),  5000 Unit(s) (06-18-24 @ 11:47)  indomethacin: 25 milliGRAM(s) (06-18-24 @ 09:58),  25 milliGRAM(s) (06-18-24 @ 04:08)  lactated ringers Bolus: 500 mL/Hr (07-07-24 @ 10:16)  lactated ringers.: 50 mL/Hr (06-18-24 @ 09:10)  magnesium sulfate Infusion: 50 mL/Hr (06-18-24 @ 05:00)  prenatal multivitamin: 1 Tablet(s) (07-13-24 @ 10:28),  1 Tablet(s) (07-12-24 @ 11:19),  1 Tablet(s) (07-11-24 @ 10:46),  1 Tablet(s) (07-10-24 @ 10:44),  1 Tablet(s) (07-09-24 @ 10:59),  1 Tablet(s) (07-08-24 @ 11:16),  1 Tablet(s) (07-07-24 @ 11:08),  1 Tablet(s) (07-06-24 @ 10:15),  1 Tablet(s) (07-05-24 @ 10:50),  1 Tablet(s) (07-04-24 @ 10:40),  1 Tablet(s) (07-03-24 @ 10:59),  1 Tablet(s) (07-02-24 @ 12:04),  1 Tablet(s) (07-01-24 @ 10:35),  1 Tablet(s) (06-30-24 @ 10:35),  1 Tablet(s) (06-29-24 @ 10:00),  1 Tablet(s) (06-28-24 @ 10:11),  1 Tablet(s) (06-27-24 @ 12:44),  1 Tablet(s) (06-26-24 @ 11:18),  1 Tablet(s) (06-25-24 @ 11:09),  1 Tablet(s) (06-24-24 @ 10:03),  1 Tablet(s) (06-23-24 @ 10:11),  1 Tablet(s) (06-22-24 @ 10:02),  1 Tablet(s) (06-21-24 @ 11:20),  1 Tablet(s) (06-20-24 @ 10:16),  1 Tablet(s) (06-19-24 @ 10:59)  valACYclovir: 500 milliGRAM(s) (07-13-24 @ 21:42),  500 milliGRAM(s) (07-13-24 @ 10:28),  500 milliGRAM(s) (07-12-24 @ 21:29),  500 milliGRAM(s) (07-12-24 @ 11:19),  500 milliGRAM(s) (07-11-24 @ 21:36),  500 milliGRAM(s) (07-11-24 @ 10:46),  500 milliGRAM(s) (07-10-24 @ 22:35),  500 milliGRAM(s) (07-10-24 @ 10:45),  500 milliGRAM(s) (07-09-24 @ 22:34),  500 milliGRAM(s) (07-09-24 @ 10:59),  500 milliGRAM(s) (07-08-24 @ 21:45),  500 milliGRAM(s) (07-08-24 @ 11:16),  500 milliGRAM(s) (07-07-24 @ 22:09),  500 milliGRAM(s) (07-07-24 @ 11:08),  500 milliGRAM(s) (07-06-24 @ 22:06),  500 milliGRAM(s) (07-06-24 @ 10:14),  500 milliGRAM(s) (07-05-24 @ 21:34),  500 milliGRAM(s) (07-05-24 @ 10:50),  500 milliGRAM(s) (07-04-24 @ 21:01),  500 milliGRAM(s) (07-04-24 @ 10:41),  500 milliGRAM(s) (07-03-24 @ 21:22),  500 milliGRAM(s) (07-03-24 @ 10:59),  500 milliGRAM(s) (07-02-24 @ 21:57),  500 milliGRAM(s) (07-02-24 @ 12:04),  500 milliGRAM(s) (07-01-24 @ 21:02),  500 milliGRAM(s) (07-01-24 @ 10:31),  500 milliGRAM(s) (06-30-24 @ 21:20),  500 milliGRAM(s) (06-30-24 @ 10:35),  500 milliGRAM(s) (06-29-24 @ 22:23),  500 milliGRAM(s) (06-29-24 @ 10:01),  500 milliGRAM(s) (06-28-24 @ 21:06),  500 milliGRAM(s) (06-28-24 @ 10:11),  500 milliGRAM(s) (06-27-24 @ 22:22),  500 milliGRAM(s) (06-27-24 @ 12:44),  500 milliGRAM(s) (06-26-24 @ 22:19),  500 milliGRAM(s) (06-26-24 @ 11:17),  500 milliGRAM(s) (06-25-24 @ 21:48),  500 milliGRAM(s) (06-25-24 @ 11:09),  500 milliGRAM(s) (06-24-24 @ 21:01),  500 milliGRAM(s) (06-24-24 @ 10:02),  500 milliGRAM(s) (06-23-24 @ 21:32),  500 milliGRAM(s) (06-23-24 @ 10:11),  500 milliGRAM(s) (06-22-24 @ 22:13),  500 milliGRAM(s) (06-22-24 @ 10:01),  500 milliGRAM(s) (06-21-24 @ 21:46),  500 milliGRAM(s) (06-21-24 @ 11:21),  500 milliGRAM(s) (06-20-24 @ 21:02),  500 milliGRAM(s) (06-20-24 @ 10:17),  500 milliGRAM(s) (06-19-24 @ 22:01),  500 milliGRAM(s) (06-19-24 @ 10:32),  500 milliGRAM(s) (06-18-24 @ 21:25),  500 milliGRAM(s) (06-18-24 @ 08:06)

## 2024-07-15 LAB
BASOPHILS # BLD AUTO: 0.04 K/UL — SIGNIFICANT CHANGE UP (ref 0–0.2)
BASOPHILS NFR BLD AUTO: 0.4 % — SIGNIFICANT CHANGE UP (ref 0–2)
BLD GP AB SCN SERPL QL: POSITIVE — SIGNIFICANT CHANGE UP
EOSINOPHIL # BLD AUTO: 0.15 K/UL — SIGNIFICANT CHANGE UP (ref 0–0.5)
EOSINOPHIL NFR BLD AUTO: 1.4 % — SIGNIFICANT CHANGE UP (ref 0–6)
HCT VFR BLD CALC: 31 % — LOW (ref 34.5–45)
HGB BLD-MCNC: 10.8 G/DL — LOW (ref 11.5–15.5)
IANC: 6.82 K/UL — SIGNIFICANT CHANGE UP (ref 1.8–7.4)
IMM GRANULOCYTES NFR BLD AUTO: 7 % — HIGH (ref 0–0.9)
LYMPHOCYTES # BLD AUTO: 19.4 % — SIGNIFICANT CHANGE UP (ref 13–44)
LYMPHOCYTES # BLD AUTO: 2.1 K/UL — SIGNIFICANT CHANGE UP (ref 1–3.3)
MCHC RBC-ENTMCNC: 29.5 PG — SIGNIFICANT CHANGE UP (ref 27–34)
MCHC RBC-ENTMCNC: 34.8 GM/DL — SIGNIFICANT CHANGE UP (ref 32–36)
MCV RBC AUTO: 84.7 FL — SIGNIFICANT CHANGE UP (ref 80–100)
MONOCYTES # BLD AUTO: 0.94 K/UL — HIGH (ref 0–0.9)
MONOCYTES NFR BLD AUTO: 8.7 % — SIGNIFICANT CHANGE UP (ref 2–14)
NEUTROPHILS # BLD AUTO: 6.82 K/UL — SIGNIFICANT CHANGE UP (ref 1.8–7.4)
NEUTROPHILS NFR BLD AUTO: 63.1 % — SIGNIFICANT CHANGE UP (ref 43–77)
NRBC # BLD: 0 /100 WBCS — SIGNIFICANT CHANGE UP (ref 0–0)
NRBC # FLD: 0 K/UL — SIGNIFICANT CHANGE UP (ref 0–0)
PLATELET # BLD AUTO: 188 K/UL — SIGNIFICANT CHANGE UP (ref 150–400)
RBC # BLD: 3.66 M/UL — LOW (ref 3.8–5.2)
RBC # FLD: 13.2 % — SIGNIFICANT CHANGE UP (ref 10.3–14.5)
RH IG SCN BLD-IMP: NEGATIVE — SIGNIFICANT CHANGE UP
WBC # BLD: 10.81 K/UL — HIGH (ref 3.8–10.5)
WBC # FLD AUTO: 10.81 K/UL — HIGH (ref 3.8–10.5)

## 2024-07-15 PROCEDURE — 86077 PHYS BLOOD BANK SERV XMATCH: CPT

## 2024-07-15 RX ORDER — AMPICILLIN TRIHYDRATE 250 MG
2 CAPSULE ORAL ONCE
Refills: 0 | Status: COMPLETED | OUTPATIENT
Start: 2024-07-15 | End: 2024-07-15

## 2024-07-15 RX ORDER — OXYTOCIN 30 [USP'U]/500ML
2 INJECTION, SOLUTION INTRAVENOUS
Qty: 30 | Refills: 0 | Status: DISCONTINUED | OUTPATIENT
Start: 2024-07-15 | End: 2024-07-15

## 2024-07-15 RX ORDER — AMPICILLIN TRIHYDRATE 250 MG
1 CAPSULE ORAL EVERY 4 HOURS
Refills: 0 | Status: DISCONTINUED | OUTPATIENT
Start: 2024-07-15 | End: 2024-07-16

## 2024-07-15 RX ORDER — OXYTOCIN 30 [USP'U]/500ML
2 INJECTION, SOLUTION INTRAVENOUS
Qty: 30 | Refills: 0 | Status: DISCONTINUED | OUTPATIENT
Start: 2024-07-15 | End: 2024-07-17

## 2024-07-15 RX ORDER — DEXTROSE MONOHYDRATE AND SODIUM CHLORIDE 5; .3 G/100ML; G/100ML
1000 INJECTION, SOLUTION INTRAVENOUS
Refills: 0 | Status: DISCONTINUED | OUTPATIENT
Start: 2024-07-15 | End: 2024-07-18

## 2024-07-15 RX ADMIN — Medication 325 MILLIGRAM(S): at 09:52

## 2024-07-15 RX ADMIN — Medication 1 APPLICATION(S): at 10:16

## 2024-07-15 RX ADMIN — Medication 1 TABLET(S): at 09:52

## 2024-07-15 RX ADMIN — Medication 500 MILLIGRAM(S): at 09:52

## 2024-07-15 RX ADMIN — Medication 200 GRAM(S): at 15:16

## 2024-07-15 RX ADMIN — OXYTOCIN 2 MILLIUNIT(S)/MIN: 30 INJECTION, SOLUTION INTRAVENOUS at 16:00

## 2024-07-15 RX ADMIN — Medication 108 GRAM(S): at 19:23

## 2024-07-15 RX ADMIN — VALACYCLOVIR HYDROCHLORIDE 500 MILLIGRAM(S): 500 TABLET, FILM COATED ORAL at 10:17

## 2024-07-15 NOTE — PROGRESS NOTE ADULT - ASSESSMENT
A/P: 29yo  @34w5d transferred from [facility], admitted with PPROM @30w5d on . Patient is clinically stable with no signs of infection or PTL. Pt scheduled for IOL this afternoon.    #PPROM  - s/p Mg (-)  - s/p Indocin  - s/p betamethasone (-)  - Candidate for rescue BMZ on   - s/p ampicillin (-)  - s/p amoxicillin (-)   - Afebrile, no signs/symptoms of infection    #HSV  - No lesions on SSE  - Continue Valtrex 500mg BID for ppx    #Palpitations  - Outpatient TTE (): EF 65-70%, V ventricular systolic function hyperdynamic. No valvular disease.  - s/p Holter monitor  - Cardio OB recs: no issues from cardiac perspective expected for delivery/anesthesia    #Fetal wellbeing  - NST BID  - ATU (): vtx, post, JUAN 9.23, MVP 3.66, BPP 8/8  - ATU (): vtx, post, JAUN 3.45, MVP<2, BPP 6/8, EFW 1766g (58%), AC 63%  - GBS+    #Maternal wellbeing  - Contraception: undecided, but declines BS or IUD  - HSQ for DVT ppx  - PNV  - Fe, Vit C  - Regular diet  - s/p Tdap     #Disposition  - IOL scheduled today, 7/15 at 2p (34w5d)    Brenton Ohara, PGY-3

## 2024-07-15 NOTE — PROGRESS NOTE ADULT - NSPROGADDITIONALINFOA_GEN_ALL_CORE
28 y.o. P0 at 34w5d admitted for PPROM. No complaints this morning. Stable vitals with benign abdominal exam and no leukocytosis. This AM reactive NST, baseline 140, moderate variability, accels, no decels. Plan to transfer to L&D for IOL given cephalic presentation. ACS complete. NICU aware.     Seen and d/w Dr. Gisel Hill, MELISSAM Fellow

## 2024-07-15 NOTE — PROGRESS NOTE ADULT - SUBJECTIVE AND OBJECTIVE BOX
PGY3 Antepartum Note    Patient seen and examined at bedside in NAD. Denies any CTX or VB.  Reports good fetal movement. Pt endorses small trickles of clear fluid. Denies headache, epigastric pain, blurred vision, chest pain, SOB, nausea, vomiting, fevers, chills.     T(F): 98.1 (06:08)  HR: 94 (06:08)  BP: 116/72 (06:08)  RR: 18 (06:08)      Gen: NAD  Cardio: rrr, s1/s2  Pulm: ca b/l  Abd: gravid, nontender, no palpable contractions  Ext: no edema,  no calf tenderness  SVE: deferred    Medications:  (ADM OVERRIDE): 1 Each (06-18-24 @ 03:50)  (ADM OVERRIDE): 1 Each (06-18-24 @ 03:50)  amoxicillin: 500 milliGRAM(s) (06-24-24 @ 21:01),  500 milliGRAM(s) (06-24-24 @ 13:44),  500 milliGRAM(s) (06-24-24 @ 06:03),  500 milliGRAM(s) (06-23-24 @ 21:31),  500 milliGRAM(s) (06-23-24 @ 13:43),  500 milliGRAM(s) (06-23-24 @ 06:16),  500 milliGRAM(s) (06-22-24 @ 22:13),  500 milliGRAM(s) (06-22-24 @ 13:36),  500 milliGRAM(s) (06-22-24 @ 06:43),  500 milliGRAM(s) (06-21-24 @ 21:45),  500 milliGRAM(s) (06-21-24 @ 14:53),  500 milliGRAM(s) (06-21-24 @ 05:12),  500 milliGRAM(s) (06-20-24 @ 21:03),  500 milliGRAM(s) (06-20-24 @ 15:08),  500 milliGRAM(s) (06-20-24 @ 06:05)  ampicillin  IVPB: 200 mL/Hr (06-19-24 @ 22:04),  200 mL/Hr (06-19-24 @ 17:17),  200 mL/Hr (06-19-24 @ 10:31),  200 mL/Hr (06-19-24 @ 03:49),  200 mL/Hr (06-18-24 @ 22:13),  200 mL/Hr (06-18-24 @ 17:33),  200 mL/Hr (06-18-24 @ 09:55)  ampicillin  IVPB: 200 mL/Hr (06-18-24 @ 04:05)  ascorbic acid: 500 milliGRAM(s) (07-14-24 @ 10:44),  500 milliGRAM(s) (07-13-24 @ 10:28),  500 milliGRAM(s) (07-12-24 @ 11:20),  500 milliGRAM(s) (07-11-24 @ 10:45),  500 milliGRAM(s) (07-10-24 @ 10:45),  500 milliGRAM(s) (07-09-24 @ 10:59),  500 milliGRAM(s) (07-08-24 @ 11:16),  500 milliGRAM(s) (07-07-24 @ 11:08),  500 milliGRAM(s) (07-06-24 @ 10:14),  500 milliGRAM(s) (07-05-24 @ 10:51),  500 milliGRAM(s) (07-04-24 @ 10:40),  500 milliGRAM(s) (07-03-24 @ 10:59),  500 milliGRAM(s) (07-02-24 @ 12:05),  500 milliGRAM(s) (07-01-24 @ 10:32),  500 milliGRAM(s) (06-30-24 @ 10:35),  500 milliGRAM(s) (06-29-24 @ 10:01),  500 milliGRAM(s) (06-28-24 @ 10:12),  500 milliGRAM(s) (06-27-24 @ 12:44),  500 milliGRAM(s) (06-26-24 @ 11:18),  500 milliGRAM(s) (06-25-24 @ 11:09),  500 milliGRAM(s) (06-24-24 @ 10:02),  500 milliGRAM(s) (06-23-24 @ 10:11),  500 milliGRAM(s) (06-22-24 @ 10:02),  500 milliGRAM(s) (06-21-24 @ 11:20),  500 milliGRAM(s) (06-20-24 @ 10:17),  500 milliGRAM(s) (06-19-24 @ 10:59)  betamethasone Injectable: 12 milliGRAM(s) (06-18-24 @ 17:30)  chlorhexidine 2% Cloths: 1 Application(s) (07-14-24 @ 10:45),  1 Application(s) (07-13-24 @ 10:28),  1 Application(s) (07-12-24 @ 11:20),  1 Application(s) (07-11-24 @ 10:50),  1 Application(s) (07-10-24 @ 10:56),  1 Application(s) (07-09-24 @ 12:07),  1 Application(s) (07-08-24 @ 11:18),  1 Application(s) (07-07-24 @ 11:16),  1 Application(s) (07-06-24 @ 10:15),  1 Application(s) (07-05-24 @ 06:58),  1 Application(s) (07-04-24 @ 05:39),  1 Application(s) (07-03-24 @ 05:02),  1 Application(s) (07-02-24 @ 05:29),  1 Application(s) (07-01-24 @ 05:29),  1 Application(s) (06-30-24 @ 06:03),  1 Application(s) (06-28-24 @ 21:00),  1 Application(s) (06-28-24 @ 06:10),  1 Application(s) (06-27-24 @ 12:44),  1 Application(s) (06-26-24 @ 06:27),  1 Application(s) (06-25-24 @ 11:08),  1 Application(s) (06-24-24 @ 06:03),  1 Application(s) (06-23-24 @ 06:16),  1 Application(s) (06-22-24 @ 06:43),  1 Application(s) (06-21-24 @ 11:18),  1 Application(s) (06-20-24 @ 10:16),  1 Application(s) (06-19-24 @ 10:59),  1 Application(s) (06-18-24 @ 11:49)  diphtheria/tetanus/pertussis (acellular) Vaccine (Adacel): 0.5 milliLiter(s) (06-18-24 @ 17:27)  ferrous    sulfate: 325 milliGRAM(s) (07-14-24 @ 10:44),  325 milliGRAM(s) (07-13-24 @ 10:28),  325 milliGRAM(s) (07-12-24 @ 11:20),  325 milliGRAM(s) (07-11-24 @ 10:46),  325 milliGRAM(s) (07-10-24 @ 10:45),  325 milliGRAM(s) (07-09-24 @ 12:05),  325 milliGRAM(s) (07-08-24 @ 11:17),  325 milliGRAM(s) (07-07-24 @ 11:08),  325 milliGRAM(s) (07-06-24 @ 10:15),  325 milliGRAM(s) (07-05-24 @ 10:51),  325 milliGRAM(s) (07-04-24 @ 10:40),  325 milliGRAM(s) (07-03-24 @ 10:59),  325 milliGRAM(s) (07-02-24 @ 12:04),  325 milliGRAM(s) (07-01-24 @ 10:31),  325 milliGRAM(s) (06-30-24 @ 10:35),  325 milliGRAM(s) (06-29-24 @ 10:00),  325 milliGRAM(s) (06-28-24 @ 10:11),  325 milliGRAM(s) (06-27-24 @ 12:44),  325 milliGRAM(s) (06-26-24 @ 11:18),  325 milliGRAM(s) (06-25-24 @ 11:09),  325 milliGRAM(s) (06-24-24 @ 10:02),  325 milliGRAM(s) (06-23-24 @ 10:11),  325 milliGRAM(s) (06-22-24 @ 10:02),  325 milliGRAM(s) (06-21-24 @ 11:21),  325 milliGRAM(s) (06-20-24 @ 10:17),  325 milliGRAM(s) (06-19-24 @ 10:59)  heparin   Injectable: 5000 Unit(s) (07-14-24 @ 22:50),  5000 Unit(s) (07-14-24 @ 10:44),  5000 Unit(s) (07-13-24 @ 21:43),  5000 Unit(s) (07-13-24 @ 10:28),  5000 Unit(s) (07-12-24 @ 21:29),  5000 Unit(s) (07-12-24 @ 11:20),  5000 Unit(s) (07-11-24 @ 21:36),  5000 Unit(s) (07-11-24 @ 10:45),  5000 Unit(s) (07-10-24 @ 22:35),  5000 Unit(s) (07-10-24 @ 10:46),  5000 Unit(s) (07-09-24 @ 22:34),  5000 Unit(s) (07-09-24 @ 10:59),  5000 Unit(s) (07-08-24 @ 21:45),  5000 Unit(s) (07-08-24 @ 11:17),  5000 Unit(s) (07-07-24 @ 22:09),  5000 Unit(s) (07-07-24 @ 11:08),  5000 Unit(s) (07-06-24 @ 22:04),  5000 Unit(s) (07-06-24 @ 10:15),  5000 Unit(s) (07-05-24 @ 21:34),  5000 Unit(s) (07-05-24 @ 10:50),  5000 Unit(s) (07-04-24 @ 21:02),  5000 Unit(s) (07-04-24 @ 10:41),  5000 Unit(s) (07-03-24 @ 21:22),  5000 Unit(s) (07-03-24 @ 10:59),  5000 Unit(s) (07-02-24 @ 21:57),  5000 Unit(s) (07-02-24 @ 12:05),  5000 Unit(s) (07-01-24 @ 21:02),  5000 Unit(s) (07-01-24 @ 10:32),  5000 Unit(s) (06-30-24 @ 21:21),  5000 Unit(s) (06-30-24 @ 10:51),  5000 Unit(s) (06-29-24 @ 22:23),  5000 Unit(s) (06-29-24 @ 10:00),  5000 Unit(s) (06-28-24 @ 21:07),  5000 Unit(s) (06-28-24 @ 10:12),  5000 Unit(s) (06-27-24 @ 22:23),  5000 Unit(s) (06-27-24 @ 12:44),  5000 Unit(s) (06-26-24 @ 22:19),  5000 Unit(s) (06-26-24 @ 11:18),  5000 Unit(s) (06-25-24 @ 22:00),  5000 Unit(s) (06-25-24 @ 11:10),  5000 Unit(s) (06-24-24 @ 21:03),  5000 Unit(s) (06-24-24 @ 10:03),  5000 Unit(s) (06-23-24 @ 21:31),  5000 Unit(s) (06-23-24 @ 10:11),  5000 Unit(s) (06-22-24 @ 22:13),  5000 Unit(s) (06-22-24 @ 10:01),  5000 Unit(s) (06-21-24 @ 21:45),  5000 Unit(s) (06-21-24 @ 11:20),  5000 Unit(s) (06-20-24 @ 21:04),  5000 Unit(s) (06-20-24 @ 10:17),  5000 Unit(s) (06-19-24 @ 22:01),  5000 Unit(s) (06-19-24 @ 10:31),  5000 Unit(s) (06-18-24 @ 22:15),  5000 Unit(s) (06-18-24 @ 11:47)  indomethacin: 25 milliGRAM(s) (06-18-24 @ 09:58),  25 milliGRAM(s) (06-18-24 @ 04:08)  lactated ringers Bolus: 500 mL/Hr (07-07-24 @ 10:16)  lactated ringers.: 50 mL/Hr (06-18-24 @ 09:10)  magnesium sulfate Infusion: 50 mL/Hr (06-18-24 @ 05:00)  prenatal multivitamin: 1 Tablet(s) (07-14-24 @ 10:44),  1 Tablet(s) (07-13-24 @ 10:28),  1 Tablet(s) (07-12-24 @ 11:19),  1 Tablet(s) (07-11-24 @ 10:46),  1 Tablet(s) (07-10-24 @ 10:44),  1 Tablet(s) (07-09-24 @ 10:59),  1 Tablet(s) (07-08-24 @ 11:16),  1 Tablet(s) (07-07-24 @ 11:08),  1 Tablet(s) (07-06-24 @ 10:15),  1 Tablet(s) (07-05-24 @ 10:50),  1 Tablet(s) (07-04-24 @ 10:40),  1 Tablet(s) (07-03-24 @ 10:59),  1 Tablet(s) (07-02-24 @ 12:04),  1 Tablet(s) (07-01-24 @ 10:35),  1 Tablet(s) (06-30-24 @ 10:35),  1 Tablet(s) (06-29-24 @ 10:00),  1 Tablet(s) (06-28-24 @ 10:11),  1 Tablet(s) (06-27-24 @ 12:44),  1 Tablet(s) (06-26-24 @ 11:18),  1 Tablet(s) (06-25-24 @ 11:09),  1 Tablet(s) (06-24-24 @ 10:03),  1 Tablet(s) (06-23-24 @ 10:11),  1 Tablet(s) (06-22-24 @ 10:02),  1 Tablet(s) (06-21-24 @ 11:20),  1 Tablet(s) (06-20-24 @ 10:16),  1 Tablet(s) (06-19-24 @ 10:59)  valACYclovir: 500 milliGRAM(s) (07-14-24 @ 22:49),  500 milliGRAM(s) (07-14-24 @ 10:44),  500 milliGRAM(s) (07-13-24 @ 21:42),  500 milliGRAM(s) (07-13-24 @ 10:28),  500 milliGRAM(s) (07-12-24 @ 21:29),  500 milliGRAM(s) (07-12-24 @ 11:19),  500 milliGRAM(s) (07-11-24 @ 21:36),  500 milliGRAM(s) (07-11-24 @ 10:46),  500 milliGRAM(s) (07-10-24 @ 22:35),  500 milliGRAM(s) (07-10-24 @ 10:45),  500 milliGRAM(s) (07-09-24 @ 22:34),  500 milliGRAM(s) (07-09-24 @ 10:59),  500 milliGRAM(s) (07-08-24 @ 21:45),  500 milliGRAM(s) (07-08-24 @ 11:16),  500 milliGRAM(s) (07-07-24 @ 22:09),  500 milliGRAM(s) (07-07-24 @ 11:08),  500 milliGRAM(s) (07-06-24 @ 22:06),  500 milliGRAM(s) (07-06-24 @ 10:14),  500 milliGRAM(s) (07-05-24 @ 21:34),  500 milliGRAM(s) (07-05-24 @ 10:50),  500 milliGRAM(s) (07-04-24 @ 21:01),  500 milliGRAM(s) (07-04-24 @ 10:41),  500 milliGRAM(s) (07-03-24 @ 21:22),  500 milliGRAM(s) (07-03-24 @ 10:59),  500 milliGRAM(s) (07-02-24 @ 21:57),  500 milliGRAM(s) (07-02-24 @ 12:04),  500 milliGRAM(s) (07-01-24 @ 21:02),  500 milliGRAM(s) (07-01-24 @ 10:31),  500 milliGRAM(s) (06-30-24 @ 21:20),  500 milliGRAM(s) (06-30-24 @ 10:35),  500 milliGRAM(s) (06-29-24 @ 22:23),  500 milliGRAM(s) (06-29-24 @ 10:01),  500 milliGRAM(s) (06-28-24 @ 21:06),  500 milliGRAM(s) (06-28-24 @ 10:11),  500 milliGRAM(s) (06-27-24 @ 22:22),  500 milliGRAM(s) (06-27-24 @ 12:44),  500 milliGRAM(s) (06-26-24 @ 22:19),  500 milliGRAM(s) (06-26-24 @ 11:17),  500 milliGRAM(s) (06-25-24 @ 21:48),  500 milliGRAM(s) (06-25-24 @ 11:09),  500 milliGRAM(s) (06-24-24 @ 21:01),  500 milliGRAM(s) (06-24-24 @ 10:02),  500 milliGRAM(s) (06-23-24 @ 21:32),  500 milliGRAM(s) (06-23-24 @ 10:11),  500 milliGRAM(s) (06-22-24 @ 22:13),  500 milliGRAM(s) (06-22-24 @ 10:01),  500 milliGRAM(s) (06-21-24 @ 21:46),  500 milliGRAM(s) (06-21-24 @ 11:21),  500 milliGRAM(s) (06-20-24 @ 21:02),  500 milliGRAM(s) (06-20-24 @ 10:17),  500 milliGRAM(s) (06-19-24 @ 22:01),  500 milliGRAM(s) (06-19-24 @ 10:32),  500 milliGRAM(s) (06-18-24 @ 21:25),  500 milliGRAM(s) (06-18-24 @ 08:06)      Labs:                        10.8   10.81 )-----------( 527 ( 15 Jul 2024 06:12 )             31.0

## 2024-07-16 ENCOUNTER — APPOINTMENT (OUTPATIENT)
Dept: CARDIOLOGY | Facility: CLINIC | Age: 29
End: 2024-07-16

## 2024-07-16 PROCEDURE — 88307 TISSUE EXAM BY PATHOLOGIST: CPT | Mod: 26

## 2024-07-16 RX ORDER — OXYTOCIN 30 [USP'U]/500ML
10 INJECTION, SOLUTION INTRAVENOUS ONCE
Refills: 0 | Status: COMPLETED | OUTPATIENT
Start: 2024-07-16 | End: 2024-07-16

## 2024-07-16 RX ORDER — HYDROCORTISONE VALERATE 0.2 %
1 CREAM (GRAM) TOPICAL EVERY 6 HOURS
Refills: 0 | Status: DISCONTINUED | OUTPATIENT
Start: 2024-07-16 | End: 2024-07-18

## 2024-07-16 RX ORDER — BENZOCAINE 15 %
1 LIQUID (ML) TOPICAL EVERY 6 HOURS
Refills: 0 | Status: DISCONTINUED | OUTPATIENT
Start: 2024-07-16 | End: 2024-07-18

## 2024-07-16 RX ORDER — OXYTOCIN 30 [USP'U]/500ML
41.67 INJECTION, SOLUTION INTRAVENOUS
Qty: 20 | Refills: 0 | Status: DISCONTINUED | OUTPATIENT
Start: 2024-07-16 | End: 2024-07-17

## 2024-07-16 RX ORDER — SODIUM CHLORIDE 0.9 % (FLUSH) 0.9 %
3 SYRINGE (ML) INJECTION EVERY 8 HOURS
Refills: 0 | Status: DISCONTINUED | OUTPATIENT
Start: 2024-07-16 | End: 2024-07-18

## 2024-07-16 RX ORDER — DIBUCAINE 1 %
1 OINTMENT (GRAM) TOPICAL EVERY 6 HOURS
Refills: 0 | Status: DISCONTINUED | OUTPATIENT
Start: 2024-07-16 | End: 2024-07-18

## 2024-07-16 RX ORDER — PRAMOXINE HCL 1 %
1 CREAM (GRAM) RECTAL EVERY 4 HOURS
Refills: 0 | Status: DISCONTINUED | OUTPATIENT
Start: 2024-07-16 | End: 2024-07-18

## 2024-07-16 RX ORDER — ACETAMINOPHEN 325 MG
975 TABLET ORAL
Refills: 0 | Status: DISCONTINUED | OUTPATIENT
Start: 2024-07-16 | End: 2024-07-18

## 2024-07-16 RX ORDER — SIMETHICONE 40MG/0.6ML
80 SUSPENSION, DROPS(FINAL DOSAGE FORM)(ML) ORAL EVERY 4 HOURS
Refills: 0 | Status: DISCONTINUED | OUTPATIENT
Start: 2024-07-16 | End: 2024-07-18

## 2024-07-16 RX ORDER — TETANUS TOXOID, REDUCED DIPHTHERIA TOXOID AND ACELLULAR PERTUSSIS VACCINE, ADSORBED 5; 2.5; 8; 8; 2.5 [IU]/.5ML; [IU]/.5ML; UG/.5ML; UG/.5ML; UG/.5ML
0.5 SUSPENSION INTRAMUSCULAR ONCE
Refills: 0 | Status: DISCONTINUED | OUTPATIENT
Start: 2024-07-16 | End: 2024-07-18

## 2024-07-16 RX ORDER — OXYCODONE HYDROCHLORIDE 100 MG/5ML
5 SOLUTION ORAL ONCE
Refills: 0 | Status: DISCONTINUED | OUTPATIENT
Start: 2024-07-16 | End: 2024-07-18

## 2024-07-16 RX ORDER — KETOROLAC TROMETHAMINE 30 MG/ML
30 INJECTION, SOLUTION INTRAMUSCULAR ONCE
Refills: 0 | Status: DISCONTINUED | OUTPATIENT
Start: 2024-07-16 | End: 2024-07-16

## 2024-07-16 RX ORDER — LANOLIN
1 WAX (GRAM) MISCELLANEOUS EVERY 6 HOURS
Refills: 0 | Status: DISCONTINUED | OUTPATIENT
Start: 2024-07-16 | End: 2024-07-18

## 2024-07-16 RX ORDER — HYDROCORTISONE ACETATE 1 %
1 OINTMENT (GRAM) RECTAL EVERY 4 HOURS
Refills: 0 | Status: DISCONTINUED | OUTPATIENT
Start: 2024-07-16 | End: 2024-07-18

## 2024-07-16 RX ORDER — OXYCODONE HYDROCHLORIDE 100 MG/5ML
5 SOLUTION ORAL
Refills: 0 | Status: DISCONTINUED | OUTPATIENT
Start: 2024-07-16 | End: 2024-07-18

## 2024-07-16 RX ORDER — DIPHENHYDRAMINE HCL 12.5MG/5ML
25 ELIXIR ORAL EVERY 6 HOURS
Refills: 0 | Status: DISCONTINUED | OUTPATIENT
Start: 2024-07-16 | End: 2024-07-18

## 2024-07-16 RX ORDER — METHYLERGONOVINE MALEATE 0.2 MG
0.2 TABLET ORAL ONCE
Refills: 0 | Status: COMPLETED | OUTPATIENT
Start: 2024-07-16 | End: 2024-07-16

## 2024-07-16 RX ADMIN — KETOROLAC TROMETHAMINE 30 MILLIGRAM(S): 30 INJECTION, SOLUTION INTRAMUSCULAR at 20:20

## 2024-07-16 RX ADMIN — Medication 108 GRAM(S): at 08:14

## 2024-07-16 RX ADMIN — KETOROLAC TROMETHAMINE 30 MILLIGRAM(S): 30 INJECTION, SOLUTION INTRAMUSCULAR at 20:50

## 2024-07-16 RX ADMIN — OXYTOCIN 10 UNIT(S): 30 INJECTION, SOLUTION INTRAVENOUS at 19:19

## 2024-07-16 RX ADMIN — OXYTOCIN 125 MILLIUNIT(S)/MIN: 30 INJECTION, SOLUTION INTRAVENOUS at 19:16

## 2024-07-16 RX ADMIN — Medication 108 GRAM(S): at 04:12

## 2024-07-16 RX ADMIN — Medication 975 MILLIGRAM(S): at 23:40

## 2024-07-16 RX ADMIN — Medication 108 GRAM(S): at 16:12

## 2024-07-16 RX ADMIN — Medication 0.2 MILLIGRAM(S): at 19:17

## 2024-07-16 RX ADMIN — Medication 108 GRAM(S): at 00:06

## 2024-07-16 RX ADMIN — Medication 108 GRAM(S): at 12:39

## 2024-07-16 NOTE — OB RN DELIVERY SUMMARY - BABY A: APGAR 5 MIN SCORE, DELIVERY
3/2/2021  I, [facility], saw and evaluated the patient  I have discussed the patient with the resident/non-physician practitioner and agree with the resident's/non-physician practitioner's findings, Plan of Care, and MDM as documented in the resident's/non-physician practitioner's note, except where noted  All available labs and Radiology studies were reviewed  I was present for key portions of any procedure(s) performed by the resident/non-physician practitioner and I was immediately available to provide assistance  At this point I agree with the current assessment done in the Emergency Department         ED Course         Critical Care Time  Procedures
9

## 2024-07-16 NOTE — OB PROVIDER LABOR PROGRESS NOTE - NS_OBIHIFHRDETAILS_OBGYN_ALL_OB_FT
baseline 135  moderate variability  +accels  -decels
Baseline 150, mod variability, + accels, - decels

## 2024-07-16 NOTE — OB NEONATOLOGY/PEDIATRICIAN DELIVERY SUMMARY - NSPEDSNEONOTESA_OBGYN_ALL_OB_FT
Level 1 peds called to delivery for  delivery 2/2 PPROM. Male infant born at 34 + 6 wks via  to a 31 y/o  blood type A neg mother. Maternal history of anemia, HSV2 (negative SSE), PCOS, HPV. Prenatal history of short cervix (on vaginal progesterone), PPROM on . Prenatal labs nr/immune/-, GBS + on . SROM at 1355 on  with clear fluids. EOS score >4, highest maternal temperature (recorded) of 37.2 C. Baby emerged vigorous, crying. Cord clamping delayed 90 sec. Infant was brought to radiant warmer and warmed, dried, stimulated and suctioned. HR>100. At 7MOL, baby developed grunting, nasal flaring and subcostal retractions, so started on CPAP with max settings 5/40%. APGARS of 9/9. Void x1 in delivery room. Mom is initiating breast feeding. Consents to Hepatitis B vaccination. Desires for infant to be circumcised. Admitted to NICU for respiratory failure and prematurity on bCPAP 5/25%.

## 2024-07-16 NOTE — OB PROVIDER LABOR PROGRESS NOTE - ASSESSMENT
28y G1 @34w6d being induced now fully dilated and ROP.    - Continue cont EFM, toco, IVF  - Maternal repositioning for fetal rotation  - Continue pitocin for IOL    D/w Dr. Riaz Bass PGY2
A/P 28y  @34w5d with PPROM@30w5d. Pt scheduled for IOL today.  -IOL: For Pitocin once moved to L&D  -For continuous EFM once moved to L&D  -GBS pos, for ampicillin once moved to L&D  -EFW 2168 (from growth scan )  -PPROM - s/p BMZ (-), latency abx (-), Mg ()  -Analgesia - not requesting pain mgmt at this time  -Anticipate     D/w Dr. Gisel Ohara, PGY-3
Pt is making cervical change.   - continue with pitocin  - continue EFH/toco  - Pt declining epidural at this time    Dr. Goode aware  Rosa Dennison PGY3

## 2024-07-16 NOTE — OB PROVIDER DELIVERY SUMMARY - NSPROVIDERDELIVERYNOTE_OBGYN_ALL_OB_FT
Male infant delivered in BASIA position.     Spontaneous cry. Infant passed to the mother. Cord clamping was delayed for 1 minute. Infant handed to pediatrics at the bedside given prematurity. Cord gasses obtained via a segment of cord. Infant to NICU.    Placenta was delivered spontaneously intact. Uterine massage was performed and Oxytocin was given upon delivery of the placenta. Fundus noted to be intermittently atonic for which she got methergine x1. Fundus then firm.     First degree laceration repaired w/ 2-0 chromic w/ a figure-of-eight. Clitoral tear repaired with 2-0 rapide in an interrupted fashion.      Adequate hemostasis. QBL: 641  Count correct x2. Male infant delivered in BASIA position.     Spontaneous cry. Infant passed to the mother. Cord clamping was delayed for 1 minute. Infant handed to pediatrics at the bedside given prematurity. Cord gasses obtained via a segment of cord. Infant to NICU.    Placenta was delivered spontaneously intact. Uterine massage was performed and Oxytocin was given upon delivery of the placenta. Fundus noted to be intermittently atonic for which she got methergine x1. Fundus then firm.     First degree laceration repaired w/ 2-0 chromic w/ a figure-of-eight. Clitoral tear repaired with 3-0 rapide in an interrupted fashion.      Adequate hemostasis. QBL: 641  Count correct x2.

## 2024-07-16 NOTE — OB RN DELIVERY SUMMARY - NSSELHIDDEN_OBGYN_ALL_OB_FT
[NS_DeliveryAttending1_OBGYN_ALL_OB_FT:[id number]],[NS_DeliveryAssist1_OBGYN_ALL_OB_FT:[id number]],[NS_DeliveryRN_OBGYN_ALL_OB_FT:[id number]],[NS_CirculateRN2_OBGYN_ALL_OB_FT:[id number]==]

## 2024-07-16 NOTE — OB PROVIDER DELIVERY SUMMARY - NSSELHIDDEN_OBGYN_ALL_OB_FT
[NS_DeliveryAttending1_OBGYN_ALL_OB_FT:[id number]],[NS_DeliveryAssist1_OBGYN_ALL_OB_FT:[id number]],[NS_DeliveryRN_OBGYN_ALL_OB_FT:[id number]=],[NS_CirculateRN2_OBGYN_ALL_OB_FT:[id number]]

## 2024-07-16 NOTE — OB PROVIDER LABOR PROGRESS NOTE - NS_SUBJECTIVE/OBJECTIVE_OBGYN_ALL_OB_FT
R3 Labor Note    S: Patient evaluated at bedside for SVE prior to starting IOL for PPROM. BSUS confirmed vertex presentation.    O:  T(C): 36.9 (07-15-24 @ 10:00), Max: 36.9 (07-14-24 @ 14:24)  HR: 96 (07-15-24 @ 09:51) (89 - 116)  BP: 117/66 (07-15-24 @ 09:51) (106/65 - 126/64)  RR: 18 (07-15-24 @ 09:51) (17 - 18)  SpO2: 97% (07-15-24 @ 09:51) (95% - 99%)
Pt evaluated to assess cervical change
Pt seen and examined for increased pressure

## 2024-07-17 LAB
BASOPHILS # BLD AUTO: 0.06 K/UL — SIGNIFICANT CHANGE UP (ref 0–0.2)
BASOPHILS NFR BLD AUTO: 0.4 % — SIGNIFICANT CHANGE UP (ref 0–2)
EOSINOPHIL # BLD AUTO: 0.12 K/UL — SIGNIFICANT CHANGE UP (ref 0–0.5)
EOSINOPHIL NFR BLD AUTO: 0.7 % — SIGNIFICANT CHANGE UP (ref 0–6)
HCT VFR BLD CALC: 25.8 % — LOW (ref 34.5–45)
HGB BLD-MCNC: 8.9 G/DL — LOW (ref 11.5–15.5)
IANC: 12.32 K/UL — HIGH (ref 1.8–7.4)
IMM GRANULOCYTES NFR BLD AUTO: 2.6 % — HIGH (ref 0–0.9)
KLEIHAUER-BETKE CALCULATION: 0 % — SIGNIFICANT CHANGE UP (ref 0–0.2)
LYMPHOCYTES # BLD AUTO: 13.8 % — SIGNIFICANT CHANGE UP (ref 13–44)
LYMPHOCYTES # BLD AUTO: 2.32 K/UL — SIGNIFICANT CHANGE UP (ref 1–3.3)
MCHC RBC-ENTMCNC: 29.4 PG — SIGNIFICANT CHANGE UP (ref 27–34)
MCHC RBC-ENTMCNC: 34.5 GM/DL — SIGNIFICANT CHANGE UP (ref 32–36)
MCV RBC AUTO: 85.1 FL — SIGNIFICANT CHANGE UP (ref 80–100)
MONOCYTES # BLD AUTO: 1.55 K/UL — HIGH (ref 0–0.9)
MONOCYTES NFR BLD AUTO: 9.2 % — SIGNIFICANT CHANGE UP (ref 2–14)
NEUTROPHILS # BLD AUTO: 12.32 K/UL — HIGH (ref 1.8–7.4)
NEUTROPHILS NFR BLD AUTO: 73.3 % — SIGNIFICANT CHANGE UP (ref 43–77)
NRBC # BLD: 0 /100 WBCS — SIGNIFICANT CHANGE UP (ref 0–0)
NRBC # FLD: 0 K/UL — SIGNIFICANT CHANGE UP (ref 0–0)
PLATELET # BLD AUTO: 171 K/UL — SIGNIFICANT CHANGE UP (ref 150–400)
RBC # BLD: 3.03 M/UL — LOW (ref 3.8–5.2)
RBC # FLD: 12.9 % — SIGNIFICANT CHANGE UP (ref 10.3–14.5)
WBC # BLD: 16.81 K/UL — HIGH (ref 3.8–10.5)
WBC # FLD AUTO: 16.81 K/UL — HIGH (ref 3.8–10.5)

## 2024-07-17 RX ADMIN — Medication 600 MILLIGRAM(S): at 10:15

## 2024-07-17 RX ADMIN — Medication 600 MILLIGRAM(S): at 18:54

## 2024-07-17 RX ADMIN — Medication 975 MILLIGRAM(S): at 12:25

## 2024-07-17 RX ADMIN — Medication 3 MILLILITER(S): at 14:00

## 2024-07-17 RX ADMIN — Medication 3 MILLILITER(S): at 21:00

## 2024-07-17 RX ADMIN — Medication 325 MILLIGRAM(S): at 12:26

## 2024-07-17 RX ADMIN — Medication 500 MILLIGRAM(S): at 12:26

## 2024-07-17 RX ADMIN — Medication 600 MILLIGRAM(S): at 09:38

## 2024-07-17 RX ADMIN — Medication 975 MILLIGRAM(S): at 06:22

## 2024-07-17 RX ADMIN — Medication 975 MILLIGRAM(S): at 13:00

## 2024-07-17 RX ADMIN — Medication 1 TABLET(S): at 12:26

## 2024-07-17 RX ADMIN — Medication 600 MILLIGRAM(S): at 17:57

## 2024-07-17 RX ADMIN — Medication 975 MILLIGRAM(S): at 00:40

## 2024-07-17 NOTE — PROGRESS NOTE ADULT - SUBJECTIVE AND OBJECTIVE BOX
OB Progress Note:  PPD#1    S: 27yo  PPD#1 s/p . Patient feels well. Pain is well controlled. She is tolerating a regular diet and passing flatus. She is voiding spontaneously, and ambulating without difficulty. Denies CP/SOB. Denies lightheadedness/dizziness. Denies N/V.    O:  Vitals:  Vital Signs Last 24 Hrs  T(C): 36.7 (2024 02:00), Max: 37.2 (2024 18:01)  T(F): 98.1 (2024 02:00), Max: 98.96 (2024 18:01)  HR: 84 (2024 02:00) (73 - 175)  BP: 118/70 (2024 02:00) (91/50 - 137/84)  BP(mean): --  RR: 18 (2024 02:00) (15 - 18)  SpO2: 99% (2024 02:00) (72% - 100%)    Parameters below as of 2024 02:00  Patient On (Oxygen Delivery Method): room air        MEDICATIONS  (STANDING):  acetaminophen     Tablet .. 975 milliGRAM(s) Oral <User Schedule>  ascorbic acid 500 milliGRAM(s) Oral daily  chlorhexidine 2% Cloths 1 Application(s) Topical <User Schedule>  diphtheria/tetanus/pertussis (acellular) Vaccine (Adacel) 0.5 milliLiter(s) IntraMuscular once  ferrous    sulfate 325 milliGRAM(s) Oral daily  ibuprofen  Tablet. 600 milliGRAM(s) Oral every 6 hours  lactated ringers. 1000 milliLiter(s) (125 mL/Hr) IV Continuous <Continuous>  oxytocin Infusion 41.667 milliUNIT(s)/Min (125 mL/Hr) IV Continuous <Continuous>  oxytocin Infusion. 2 milliUNIT(s)/Min (2 mL/Hr) IV Continuous <Continuous>  prenatal multivitamin 1 Tablet(s) Oral daily  sodium chloride 0.9% lock flush 3 milliLiter(s) IV Push every 8 hours    MEDICATIONS  (PRN):  benzocaine 20%/menthol 0.5% Spray 1 Spray(s) Topical every 6 hours PRN for Perineal discomfort  dibucaine 1% Ointment 1 Application(s) Topical every 6 hours PRN Perineal discomfort  diphenhydrAMINE 25 milliGRAM(s) Oral every 6 hours PRN Pruritus  hydrocortisone 1% Cream 1 Application(s) Topical every 6 hours PRN Moderate Pain (4-6)  lanolin Ointment 1 Application(s) Topical every 6 hours PRN nipple soreness  magnesium hydroxide Suspension 30 milliLiter(s) Oral two times a day PRN Constipation  oxyCODONE    IR 5 milliGRAM(s) Oral once PRN Moderate to Severe Pain (4-10)  oxyCODONE    IR 5 milliGRAM(s) Oral every 3 hours PRN Moderate to Severe Pain (4-10)  pramoxine 1%/zinc 5% Cream 1 Application(s) Topical every 4 hours PRN Moderate Pain (4-6)  simethicone 80 milliGRAM(s) Chew every 4 hours PRN Gas  witch hazel Pads 1 Application(s) Topical every 4 hours PRN Perineal discomfort      Labs:  Blood type: A2 Negative  Rubella IgG: RPR: Negative                          10.8<L>   10.81<H> >-----------< 188    ( 07-15 @ 06:12 )             31.0<L>                  Physical Exam:  General: NAD  Abdomen: soft, non-tender, non-distended, fundus firm  Vaginal: Lochia wnl  Extremities: No erythema/edema     OB Progress Note:  PPD#1    S: 29yo  PPD#1 s/p . Patient feels well. Pain is well controlled. She is tolerating a regular diet, not yet passing flatus. She is voiding spontaneously, and ambulating without difficulty. Denies CP/SOB. Denies lightheadedness/dizziness. Denies N/V.    O:  Vitals:  Vital Signs Last 24 Hrs  T(C): 36.7 (2024 02:00), Max: 37.2 (2024 18:01)  T(F): 98.1 (2024 02:00), Max: 98.96 (2024 18:01)  HR: 84 (2024 02:00) (73 - 175)  BP: 118/70 (2024 02:00) (91/50 - 137/84)  BP(mean): --  RR: 18 (2024 02:00) (15 - 18)  SpO2: 99% (2024 02:00) (72% - 100%)    Parameters below as of 2024 02:00  Patient On (Oxygen Delivery Method): room air        MEDICATIONS  (STANDING):  acetaminophen     Tablet .. 975 milliGRAM(s) Oral <User Schedule>  ascorbic acid 500 milliGRAM(s) Oral daily  chlorhexidine 2% Cloths 1 Application(s) Topical <User Schedule>  diphtheria/tetanus/pertussis (acellular) Vaccine (Adacel) 0.5 milliLiter(s) IntraMuscular once  ferrous    sulfate 325 milliGRAM(s) Oral daily  ibuprofen  Tablet. 600 milliGRAM(s) Oral every 6 hours  lactated ringers. 1000 milliLiter(s) (125 mL/Hr) IV Continuous <Continuous>  oxytocin Infusion 41.667 milliUNIT(s)/Min (125 mL/Hr) IV Continuous <Continuous>  oxytocin Infusion. 2 milliUNIT(s)/Min (2 mL/Hr) IV Continuous <Continuous>  prenatal multivitamin 1 Tablet(s) Oral daily  sodium chloride 0.9% lock flush 3 milliLiter(s) IV Push every 8 hours    MEDICATIONS  (PRN):  benzocaine 20%/menthol 0.5% Spray 1 Spray(s) Topical every 6 hours PRN for Perineal discomfort  dibucaine 1% Ointment 1 Application(s) Topical every 6 hours PRN Perineal discomfort  diphenhydrAMINE 25 milliGRAM(s) Oral every 6 hours PRN Pruritus  hydrocortisone 1% Cream 1 Application(s) Topical every 6 hours PRN Moderate Pain (4-6)  lanolin Ointment 1 Application(s) Topical every 6 hours PRN nipple soreness  magnesium hydroxide Suspension 30 milliLiter(s) Oral two times a day PRN Constipation  oxyCODONE    IR 5 milliGRAM(s) Oral once PRN Moderate to Severe Pain (4-10)  oxyCODONE    IR 5 milliGRAM(s) Oral every 3 hours PRN Moderate to Severe Pain (4-10)  pramoxine 1%/zinc 5% Cream 1 Application(s) Topical every 4 hours PRN Moderate Pain (4-6)  simethicone 80 milliGRAM(s) Chew every 4 hours PRN Gas  witch hazel Pads 1 Application(s) Topical every 4 hours PRN Perineal discomfort      Labs:  Blood type: A2 Negative  Rubella IgG: RPR: Negative                          10.8<L>   10.81<H> >-----------< 188    ( 07-15 @ 06:12 )             31.0<L>                  Physical Exam:  General: NAD  Abdomen: soft, non-tender, non-distended, fundus firm  Vaginal: Lochia wnl  Extremities: No erythema/edema

## 2024-07-17 NOTE — PROGRESS NOTE ADULT - ASSESSMENT
A/P: 27yo PPD#1 s/p .  Patient is stable and doing well post-partum.     #postpartum  - Hct: ___  - Pain well controlled, continue current pain regimen  - Increase ambulation, SCDs when not ambulating  - Continue regular diet  - Postpartum contraception: ___    A Sacks, PGY1 A/P: 29yo PPD#1 s/p  at 34w6d in setting of PPROM at 30w5d.  Patient is stable and doing well post-partum.     #PPROM  - Ante admission ( - for PPROM)   - s/p BMZ  (-), Mg (-), latency abx (-) (PNV)    #postpartum  -   - Hct: 10.8->___  - Pain well controlled, continue current pain regimen  - Increase ambulation, SCDs when not ambulating  - Continue regular diet  - Postpartum contraception: declines     Tx from [facility] (Dr. Martinez)    A Sacks, PGY1     A/P: 29yo PPD#1 s/p  at 34w6d in setting of PPROM at 30w5d.  Patient is stable and doing well post-partum.     #PPROM  - Ante admission ( - for PPROM)   - s/p BMZ  (-), Mg (-), latency abx (-) (PNV)    #postpartum  - , s/p methergine()  - Hct: 31.0->25.8  - Pain well controlled, continue current pain regimen  - Increase ambulation, SCDs when not ambulating  - Continue regular diet  - Postpartum contraception: declines     Tx from [facility] (Dr. Martinez), would like to follow up there for postpartum care    A Sacks, PGY1

## 2024-07-17 NOTE — PROGRESS NOTE ADULT - ATTENDING COMMENTS
Associate Chief of L & D (Late entry)    I have met this patient for the first time.  She was admitted by DR Navarrete when she was transferred from [facility] at 30+ weeks with PPROM and was delivered by Dr. Oneal at 34 6/7 weeks gestation     OB Progress Note:  PPD#1    S: 29yo  PPD#1 s/p . Patient denies CP/SOB. Denies lightheadedness/dizziness. Denies N/V.    O:  Vitals:  Vital Signs Last 24 Hrs  T(C): 36.7 (2024 06:20), Max: 37.2 (2024 18:01)  T(F): 98 (2024 06:20), Max: 98.96 (2024 18:01)  HR: 78 (2024 06:20) (78 - 175)  BP: 104/57 (2024 06:20) (104/57 - 135/61)  RR: 18 (2024 06:20) (15 - 18)  SpO2: 99% (2024 06:20) (72% - 100%)    Parameters below as of 2024 06:20  Patient On (Oxygen Delivery Method): room air        MEDICATIONS  (STANDING):  acetaminophen     Tablet .. 975 milliGRAM(s) Oral <User Schedule>  ascorbic acid 500 milliGRAM(s) Oral daily  chlorhexidine 2% Cloths 1 Application(s) Topical <User Schedule>  diphtheria/tetanus/pertussis (acellular) Vaccine (Adacel) 0.5 milliLiter(s) IntraMuscular once  ferrous    sulfate 325 milliGRAM(s) Oral daily  ibuprofen  Tablet. 600 milliGRAM(s) Oral every 6 hours  lactated ringers. 1000 milliLiter(s) (125 mL/Hr) IV Continuous <Continuous>  oxytocin Infusion 41.667 milliUNIT(s)/Min (125 mL/Hr) IV Continuous <Continuous>  oxytocin Infusion. 2 milliUNIT(s)/Min (2 mL/Hr) IV Continuous <Continuous>  prenatal multivitamin 1 Tablet(s) Oral daily  sodium chloride 0.9% lock flush 3 milliLiter(s) IV Push every 8 hours      Labs:  Blood type: A2 Negative  Rubella IgG: RPR: Negative                          8.9<L>   16.81<H> >-----------< 171    (  @ 06:54 )             25.8<L>                        10.8<L>   10.81<H> >-----------< 188    ( 07-15 @ 06:12 )             31.0<L>            Physical Exam:    Abdomen: soft, non-tender, non-distended, fundus firm  Vaginal: Lochia scant suture in situ  Extremities: No erythema/trace edema    A/P: 29yo PPD#1 s/p  and repair of 1st degree laceration and leukocytosis     - Pain well controlled, continue current pain regimen  - Increase ambulation, SCDs when not ambulating  - Continue regular diet  - Repeat CBC in the [facility] Brayan Ramos M.D., M.B.A., M.S.

## 2024-07-18 ENCOUNTER — TRANSCRIPTION ENCOUNTER (OUTPATIENT)
Age: 29
End: 2024-07-18

## 2024-07-18 VITALS
DIASTOLIC BLOOD PRESSURE: 68 MMHG | HEART RATE: 97 BPM | RESPIRATION RATE: 18 BRPM | OXYGEN SATURATION: 100 % | SYSTOLIC BLOOD PRESSURE: 109 MMHG | TEMPERATURE: 98 F

## 2024-07-18 LAB
HCT VFR BLD CALC: 24.4 % — LOW (ref 34.5–45)
HGB BLD-MCNC: 8 G/DL — LOW (ref 11.5–15.5)
MCHC RBC-ENTMCNC: 28.7 PG — SIGNIFICANT CHANGE UP (ref 27–34)
MCHC RBC-ENTMCNC: 32.8 GM/DL — SIGNIFICANT CHANGE UP (ref 32–36)
MCV RBC AUTO: 87.5 FL — SIGNIFICANT CHANGE UP (ref 80–100)
NRBC # BLD: 0 /100 WBCS — SIGNIFICANT CHANGE UP (ref 0–0)
NRBC # FLD: 0 K/UL — SIGNIFICANT CHANGE UP (ref 0–0)
PLATELET # BLD AUTO: 169 K/UL — SIGNIFICANT CHANGE UP (ref 150–400)
RBC # BLD: 2.79 M/UL — LOW (ref 3.8–5.2)
RBC # FLD: 13.3 % — SIGNIFICANT CHANGE UP (ref 10.3–14.5)
WBC # BLD: 12.56 K/UL — HIGH (ref 3.8–10.5)
WBC # FLD AUTO: 12.56 K/UL — HIGH (ref 3.8–10.5)

## 2024-07-18 RX ORDER — ACETAMINOPHEN 325 MG
3 TABLET ORAL
Qty: 0 | Refills: 0 | DISCHARGE
Start: 2024-07-18

## 2024-07-18 RX ORDER — LANOLIN
1 WAX (GRAM) MISCELLANEOUS
Qty: 0 | Refills: 0 | DISCHARGE
Start: 2024-07-18

## 2024-07-18 RX ORDER — PROGESTERONE, MICRONIZED 100 %
1 POWDER (GRAM) MISCELLANEOUS
Refills: 0 | DISCHARGE

## 2024-07-18 RX ORDER — VALACYCLOVIR HYDROCHLORIDE 500 MG/1
1 TABLET, FILM COATED ORAL
Refills: 0 | DISCHARGE

## 2024-07-18 RX ORDER — FERROUS SULFATE 325(65) MG
1 TABLET ORAL
Qty: 0 | Refills: 0 | DISCHARGE
Start: 2024-07-18

## 2024-07-18 RX ORDER — PRENATAL VIT/IRON FUM/FOLIC AC 60 MG-1 MG
1 TABLET ORAL
Qty: 0 | Refills: 0 | DISCHARGE
Start: 2024-07-18

## 2024-07-18 RX ORDER — HYDROCORTISONE ACETATE 1 %
1 OINTMENT (GRAM) RECTAL
Qty: 0 | Refills: 0 | DISCHARGE
Start: 2024-07-18

## 2024-07-18 RX ADMIN — Medication 600 MILLIGRAM(S): at 18:59

## 2024-07-18 RX ADMIN — Medication 600 MILLIGRAM(S): at 18:54

## 2024-07-18 RX ADMIN — Medication 3 MILLILITER(S): at 06:10

## 2024-07-18 RX ADMIN — Medication 600 MILLIGRAM(S): at 00:23

## 2024-07-18 RX ADMIN — Medication 600 MILLIGRAM(S): at 12:30

## 2024-07-18 RX ADMIN — Medication 600 MILLIGRAM(S): at 11:42

## 2024-07-18 RX ADMIN — Medication 1 TABLET(S): at 11:42

## 2024-07-18 RX ADMIN — Medication 600 MILLIGRAM(S): at 01:00

## 2024-07-18 NOTE — PROGRESS NOTE ADULT - ASSESSMENT
A/P: 29yo PPD#2 s/p  at 34w6d in setting of PPROM at 30w5d.  Patient is stable and doing well post-partum.     #PPROM  - Ante admission ( - for PPROM)   - s/p BMZ  (-), Mg (-), latency abx (-) (PNV)    #postpartum  - , s/p methergine()  - Hct: 31.0->25.8->  - Pain well controlled, continue current pain regimen  - Increase ambulation, SCDs when not ambulating  - Continue regular diet  - Postpartum contraception: declines     Tx from [facility] (Dr. Martinez), would like to follow up there for postpartum care    A Sacks, PGY1 A/P: 29yo PPD#2 s/p  at 34w6d in setting of PPROM at 30w5d.  Patient is stable and doing well post-partum.     #PPROM  - Ante admission ( - for PPROM)   - s/p BMZ  (-), Mg (-), latency abx (-) (PNV)    #postpartum  - , s/p methergine()  - Hct: 31.0->25.8->24.4  - Pain well controlled, continue current pain regimen  - Increase ambulation, SCDs when not ambulating  - Continue regular diet  - Postpartum contraception: declines   - Discharge planning: Tx from [facility] (Dr. Joann Martinez, [place], [facility]), would like to follow up there for postpartum care    A Sacks, PGY1

## 2024-07-18 NOTE — DISCHARGE NOTE OB - MEDICATION SUMMARY - MEDICATIONS TO TAKE
I will START or STAY ON the medications listed below when I get home from the hospital:    ibuprofen 600 mg oral tablet  -- 1 tab(s) by mouth every 6 hours  -- Indication: For Pain    acetaminophen 325 mg oral tablet  -- 3 tab(s) by mouth every 6 hours  -- Indication: For Pain    lanolin topical ointment  -- 1 Apply on skin to affected area every 6 hours As needed nipple soreness  -- Indication: For nipple pain    witch hazel 50% topical pad  -- 1 Apply on skin to affected area every 4 hours As needed Perineal discomfort  -- Indication: For Perineal pain    Prenatal Multivitamins with Folic Acid 1 mg oral tablet  -- 1 tab(s) by mouth once a day  -- Indication: For Postpartum    ferrous sulfate 325 mg (65 mg elemental iron) oral tablet  -- 1 tab(s) by mouth once a day  -- Indication: For anemia, blood loss    ascorbic acid 500 mg oral tablet  -- 1 tab(s) by mouth once a day  -- Indication: For anemia, blood loss

## 2024-07-18 NOTE — DISCHARGE NOTE OB - MEDICATION SUMMARY - MEDICATIONS TO STOP TAKING
I will STOP taking the medications listed below when I get home from the hospital:    Tylenol Extra Strength 500 mg oral tablet  -- 2 tab(s) by mouth every 6 hours MDD:4000mg  -- This product contains acetaminophen.  Do not use  with any other product containing acetaminophen to prevent possible liver damage.    progesterone 100 mg vaginal suppository  -- 1 suppository(ies) intravaginally once a day (at bedtime)    Valtrex 500 mg oral tablet  -- 1 tab(s) by mouth once a day

## 2024-07-18 NOTE — DISCHARGE NOTE OB - PATIENT PORTAL LINK FT
You can access the FollowMyHealth Patient Portal offered by [facility] by registering at the following website: [email or web address]. By joining [email or web address]’s FollowMyHealth portal, you will also be able to view your health information using other applications (apps) compatible with our system.

## 2024-07-18 NOTE — PROGRESS NOTE ADULT - SUBJECTIVE AND OBJECTIVE BOX
OB Progress Note:  PPD#2    S: 27yo PPD#2 s/p . Patient feels well. Pain is well controlled. She is tolerating a regular diet and passing flatus. She is voiding spontaneously, and ambulating without difficulty. Denies CP/SOB. Denies lightheadedness/dizziness. Denies N/V.    O:  Vitals:   Vital Signs Last 24 Hrs  T(C): 36.5 (2024 05:50), Max: 36.7 (2024 06:20)  T(F): 97.7 (2024 05:50), Max: 98 (2024 06:20)  HR: 88 (2024 05:50) (78 - 90)  BP: 106/52 (2024 05:50) (104/57 - 123/68)  BP(mean): --  RR: 18 (2024 05:50) (17 - 18)  SpO2: 98% (2024 05:50) (98% - 99%)    Parameters below as of 2024 05:50  Patient On (Oxygen Delivery Method): room air        MEDICATIONS  (STANDING):  acetaminophen     Tablet .. 975 milliGRAM(s) Oral <User Schedule>  ascorbic acid 500 milliGRAM(s) Oral daily  diphtheria/tetanus/pertussis (acellular) Vaccine (Adacel) 0.5 milliLiter(s) IntraMuscular once  ferrous    sulfate 325 milliGRAM(s) Oral daily  ibuprofen  Tablet. 600 milliGRAM(s) Oral every 6 hours  lactated ringers. 1000 milliLiter(s) (125 mL/Hr) IV Continuous <Continuous>  prenatal multivitamin 1 Tablet(s) Oral daily  sodium chloride 0.9% lock flush 3 milliLiter(s) IV Push every 8 hours    MEDICATIONS  (PRN):  benzocaine 20%/menthol 0.5% Spray 1 Spray(s) Topical every 6 hours PRN for Perineal discomfort  dibucaine 1% Ointment 1 Application(s) Topical every 6 hours PRN Perineal discomfort  diphenhydrAMINE 25 milliGRAM(s) Oral every 6 hours PRN Pruritus  hydrocortisone 1% Cream 1 Application(s) Topical every 6 hours PRN Moderate Pain (4-6)  lanolin Ointment 1 Application(s) Topical every 6 hours PRN nipple soreness  magnesium hydroxide Suspension 30 milliLiter(s) Oral two times a day PRN Constipation  oxyCODONE    IR 5 milliGRAM(s) Oral once PRN Moderate to Severe Pain (4-10)  oxyCODONE    IR 5 milliGRAM(s) Oral every 3 hours PRN Moderate to Severe Pain (4-10)  pramoxine 1%/zinc 5% Cream 1 Application(s) Topical every 4 hours PRN Moderate Pain (4-6)  simethicone 80 milliGRAM(s) Chew every 4 hours PRN Gas  witch hazel Pads 1 Application(s) Topical every 4 hours PRN Perineal discomfort      Labs:  Blood type: A2 Negative  Rubella IgG: RPR: Negative                          8.0<L>   12.56<H> >-----------< 169    (  @ 05:18 )             24.4<L>                        8.9<L>   16.81<H> >-----------< 171    (  @ 06:54 )             25.8<L>                        10.8<L>   10.81<H> >-----------< 188    ( 07-15 @ 06:12 )             31.0<L>                  Physical Exam:  General: NAD  Abdomen: soft, non-tender, non-distended, fundus firm  Vaginal: Lochia wnl  Extremities: No erythema/edema     OB Progress Note:  PPD#2    S: 29yo PPD#2 s/p . Patient feels well. Pain is well controlled. She is tolerating a regular diet and passing flatus. She is voiding spontaneously, and ambulating without difficulty. Denies CP/SOB. Denies lightheadedness/dizziness. Denies N/V. Denies fevers/chills.    O:  Vitals:   Vital Signs Last 24 Hrs  T(C): 36.5 (2024 05:50), Max: 36.7 (2024 06:20)  T(F): 97.7 (2024 05:50), Max: 98 (2024 06:20)  HR: 88 (2024 05:50) (78 - 90)  BP: 106/52 (2024 05:50) (104/57 - 123/68)  BP(mean): --  RR: 18 (2024 05:50) (17 - 18)  SpO2: 98% (2024 05:50) (98% - 99%)    Parameters below as of 2024 05:50  Patient On (Oxygen Delivery Method): room air        MEDICATIONS  (STANDING):  acetaminophen     Tablet .. 975 milliGRAM(s) Oral <User Schedule>  ascorbic acid 500 milliGRAM(s) Oral daily  diphtheria/tetanus/pertussis (acellular) Vaccine (Adacel) 0.5 milliLiter(s) IntraMuscular once  ferrous    sulfate 325 milliGRAM(s) Oral daily  ibuprofen  Tablet. 600 milliGRAM(s) Oral every 6 hours  lactated ringers. 1000 milliLiter(s) (125 mL/Hr) IV Continuous <Continuous>  prenatal multivitamin 1 Tablet(s) Oral daily  sodium chloride 0.9% lock flush 3 milliLiter(s) IV Push every 8 hours    MEDICATIONS  (PRN):  benzocaine 20%/menthol 0.5% Spray 1 Spray(s) Topical every 6 hours PRN for Perineal discomfort  dibucaine 1% Ointment 1 Application(s) Topical every 6 hours PRN Perineal discomfort  diphenhydrAMINE 25 milliGRAM(s) Oral every 6 hours PRN Pruritus  hydrocortisone 1% Cream 1 Application(s) Topical every 6 hours PRN Moderate Pain (4-6)  lanolin Ointment 1 Application(s) Topical every 6 hours PRN nipple soreness  magnesium hydroxide Suspension 30 milliLiter(s) Oral two times a day PRN Constipation  oxyCODONE    IR 5 milliGRAM(s) Oral once PRN Moderate to Severe Pain (4-10)  oxyCODONE    IR 5 milliGRAM(s) Oral every 3 hours PRN Moderate to Severe Pain (4-10)  pramoxine 1%/zinc 5% Cream 1 Application(s) Topical every 4 hours PRN Moderate Pain (4-6)  simethicone 80 milliGRAM(s) Chew every 4 hours PRN Gas  witch hazel Pads 1 Application(s) Topical every 4 hours PRN Perineal discomfort      Labs:  Blood type: A2 Negative  Rubella IgG: RPR: Negative                          8.0<L>   12.56<H> >-----------< 169    (  @ 05:18 )             24.4<L>                        8.9<L>   16.81<H> >-----------< 171    (  @ 06:54 )             25.8<L>                        10.8<L>   10.81<H> >-----------< 188    ( 07-15 @ 06:12 )             31.0<L>                  Physical Exam:  General: NAD  Abdomen: soft, non-tender, non-distended, fundus firm  Vaginal: Lochia wnl  Extremities: No erythema/edema

## 2024-07-18 NOTE — PROGRESS NOTE ADULT - ATTENDING SUPERVISION STATEMENT
Resident
Resident
Resident/Fellow
Resident
Resident/Fellow
Resident
Fellow
Resident
Resident
Resident/Fellow

## 2024-07-18 NOTE — DISCHARGE NOTE OB - HOSPITAL COURSE
Patient had an  at 34w6d in the setting of PPROM at 30w5d, followed by an uncomplicated postpartum course. , hct 31.0->25.8->__. On postpartum day 2, patient was discharged home in stable condition, voiding spontaneously and with normal vital signs.  Patient had an  at 34w6d in the setting of PPROM at 30w5d, followed by an uncomplicated postpartum course. , hct 31.0->25.8->24.4. On postpartum day 2, patient was discharged home in stable condition, voiding spontaneously and with normal vital signs. Patient declines postpartum contraception.

## 2024-07-18 NOTE — DISCHARGE NOTE OB - NS MD DC FALL RISK RISK
For information on Fall & Injury Prevention, visit: [email or web address] OR  [email or web address] OR  https://www.cdc.gov/steadi/patient.html

## 2024-07-18 NOTE — PROGRESS NOTE ADULT - ATTENDING COMMENTS
Associate Chief of L & D (Late entry)      OB Progress Note:  PPD#2    S: 27yo  PPD#2 s/p . Patient denies CP/SOB. Denies lightheadedness/dizziness. Denies N/V.    O:  Vital Signs Last 24 Hrs  T(C): 36.5 (2024 05:50), Max: 36.7 (2024 17:40)  T(F): 97.7 (2024 05:50), Max: 98 (2024 17:40)  HR: 88 (2024 05:50) (88 - 90)  BP: 106/52 (2024 05:50) (106/52 - 123/68)  RR: 18 (2024 05:50) (17 - 18)  SpO2: 98% (2024 05:50) (98% - 99%)    Parameters below as of 2024 05:50  Patient On (Oxygen Delivery Method): room air            MEDICATIONS  (STANDING):  acetaminophen     Tablet .. 975 milliGRAM(s) Oral <User Schedule>  ascorbic acid 500 milliGRAM(s) Oral daily  chlorhexidine 2% Cloths 1 Application(s) Topical <User Schedule>  diphtheria/tetanus/pertussis (acellular) Vaccine (Adacel) 0.5 milliLiter(s) IntraMuscular once  ferrous    sulfate 325 milliGRAM(s) Oral daily  ibuprofen  Tablet. 600 milliGRAM(s) Oral every 6 hours  lactated ringers. 1000 milliLiter(s) (125 mL/Hr) IV Continuous <Continuous>  oxytocin Infusion 41.667 milliUNIT(s)/Min (125 mL/Hr) IV Continuous <Continuous>  oxytocin Infusion. 2 milliUNIT(s)/Min (2 mL/Hr) IV Continuous <Continuous>  prenatal multivitamin 1 Tablet(s) Oral daily  sodium chloride 0.9% lock flush 3 milliLiter(s) IV Push every 8 hours      Labs:  Blood type: A2 Negative  Rubella IgG: RPR: Negative  LABS:                        8.0    12.56 )-----------( 169      ( 2024 05:18 )             24.4                         8.9<L>   16.81<H> >-----------< 171    ( 07-17 @ 06:54 )             25.8<L>                        10.8<L>   10.81<H> >-----------< 188    ( 07-15 @ 06:12 )             31.0<L>      Physical Exam:    Abdomen: soft, non-tender, non-distended, fundus firm  Vaginal: Lochia scant suture in situ  Extremities: No erythema/trace edema    A/P: 27yo PPD#2 s/p  and repair of 1st degree laceration and improved leukocytosis with chronic anemia     - Patient is stable for discharge and will follow up at her private Ob office    Phuong Sellers M.D., M.B.A., M.S.

## 2024-07-18 NOTE — DISCHARGE NOTE OB - PHYSICIAN SECTION COMPLETE
Bariatric Progress Report     The patient was seen for bariatric nutrition initial evaluation (# 3 of  3). The patient was referred by Dr. Derek Pérez    Visit Start 11:20  Visit End 11:40  Total time today 20 minutes  Carryover time from previous visit 10 minutes  Total minutes billed today 30 minutes  Carryover time for next visit 0 minutes    Referral Diagnosis:   Morbid obesity with body mass index of 70 and over in adult (CMS/[facility]) [E66.01, Z68.45]  BMI 85th to less than 95th percentile with athletic build, pediatric [Z68.53]   Type 2 diabetes mellitus without complication, without long-term current use of insulin (CMS/[facility]) [E11.9]    Barriers and Readiness to Learning:   The instruction was given to patient   Barriers to self-care and learning limitations: None   Preferences for Learning: no preference   Readiness to learn: The patient demonstrates the ability to understand and asks questions.   The education will be provided in an individual setting   Material was presented using verbal, written and demonstration and return demonstration     Learning Topics:   Rationale for Diet, Guidelines for Diet, Label Reading/Product Information, Food Preparation, Eating Out and Lifestyle changes     Assessment / Food intake related directly to surgery readiness:  Area(s) and level of knowledge: Pt shows Basic level of knowledge regarding diet recommendations for healthy weight loss and preparation for bariatric surgery prior to education.    7/27/22 initial visit:  Type of food/meals: patient does not eat breakfast but does eat snack around 10:00, eats lunch and dinner with afternoon snack and snack after dinner. Has made changes just recently, wants to try lower carb again. He has also initiated food preparation for snacks. Wife shops, patient prepares meals. Patient cut out \"veggie straws,\" chips, popcorn, most starches such as potatoes and pasta. Now for meals makes a protein with a non-starchy vegetable. Snacks  are nuts (up to 1 cup) or fruit. When dining out, selects healthier options. Used to go to bar 3-4 times weekly and would have typical bar-type food.    Beverages: water, flavored with Crystal Lite packets, drinks 128 ounces daily   Fried Foods: limiting  Fast Food: limiting  Eating Out: choosing lower calorie options  Sweets: not much  Soda: diet soda, 1-2 cans daily (patient is aware he will have to cut out completely eventually)  Allergies/Intolerances: none reported  Alcohol: 1 drink every 6 months  Tobacco: none    8/29/22 visit:  Patient reports he hurt his back while golfing and is seeing a chiropractor soon, has therefore, been limited in movement but does continue to walk however long he can tolerate. Patient has started using [employer] twila, but had questions on entering data, so has not used to full potential and not tracking as much as he should, his goal is to start tracking daily from now on. He has been skipping breakfast, eating balanced lunch and dinner using the plate method, for example has salad with chicken salad on top with extra non-starchy vegetables added. Drinking 1-1.5 gallons water daily. Has also been measuring foods and continues to eat lighter in general.     Today's visit:  Patient reports he has had a stressful last month, so did not have time to keep food records. 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 He reports this type of work environment has never happened before, but it was a job he needed to help his company finish. Meals included fast/quick food, pizza. He monitored portions and job is active, so was moving a lot at work. Patient reports will be starting Ozempic medication.      Medications / Supplements  Medications, specify prescription or OTC: for diabetes - prescribed   Jardiance, 1 tablet 25 mg daily  glipizide 10 mg 2 tablets daily  metformin , 4 tablets  daily  Ozempic, recently added   Also on statin, beta blocker, ace inhibitor   Patient reports is taking as prescribed     Motivation  Area(s) and level of knowledge: Patient reports he has seen a dietitian in the past but not for diabetes.   Pt is interested in surgerytype: Sofy-en-Y Gastric Bypass surgery. He is interested in surgery because he reports dieting has not worked for him, he loses weight with dieting, then goes off the diet and gains the weight back, he is the heaviest he has ever been and needs to change, he wants to improve his health (current uncontrolled diabetes and very high triglycerides). He has always had larger portions, but was also very involved with sports, when he stopped playing sports, his portions remained the same. His support person will be his parents, wife, uncle who had bariatric surgery about 6 years ago.     Behavior  Nutriton visit attendance: 3/3 (patient is self-pay, requires 3 visits with RD)    Physical Activity  Type of physical activity: most recently did much movement, steps at work, wants to get back into walking routinely now that job at work is finished and will have more regular work hours.   Patient likes to golf 3 times weekly, uses cart but does do 18 holes, also walks dog for about 30 minutes most days, starting and stopping motion, will continue to walk    Anthropometric Measurements:   Initial Weight: 439.2 lbs at consult with bariatric surgeon on 06/06/22.   5% Weight Loss Goal:  22 pounds  Goal weight: 417.2 pounds     Weight    10/03/22 1121   Weight: (!) 194.2 kg (428 lb 2.1 oz)     Hemoglobin A1C (%)   Date Value   09/14/2022 9.7 (H)   Patient reports Dr. Pérez would like to see A1c less than 8 percent for surgery. Patient recently started Ozempic.     Triglycerides (mg/dL)   Date Value   09/14/2022 358 (H)     Weight change: Down 11 lbs since surgeon visit.  Patient is aware that their weight needs to be 5% below initial weight with the surgeon to be  considered an appropriate candidate for bariatric surgery.     Goal weight: 200 pounds (patient desired)  Last at that weight: high school  Highest weight: current  Weight changes: up and down over the years, was able to get down to 300 pounds with dieting  Diet attempts: Atkins, low carb, air force diet  Most weight loss: 60 pounds  Biggest barrier to losing weight: portion control, large portions, patient reports he \"does not feel full,\" perhaps because he is used to such large portions    Client History:   DM-2, currently uncontrolled, HTN, hyperlipidemia, GERD, RAVEN, uses CPAP. Patient manages painting company, varied hours, reports no major stressors currently. Patient has attended a bariatric support group and has psych visit scheduled 10/13/22.     Nutrition Diagnosis:   Nutrition-related knowledge deficit related to patient with no prior knowledge of healthy eating for diabetes and weight loss as evidenced by food recall, uncontrolled diabetes, patient questions, desire and motivation for change.      Nutrition Prescription:    Eat 3 small meals per day, Choose foods low in sugar, Choose foods low in fat, Eat meals slowly, Substitute low calorie non-carbonated beverages for carbonated , Be physically active and No weight gain     Intervention:   Recommended modifications:   Empathized the stress patient had recently and encouraged to continue toward goals. Briefly discussed goals made last visit to work toward for next visit.       Print/Written Resources Provided previously:   AVS  Key Points Following Bariatric Surgery, Important Vitamins and Minerals Following Bariatric Surgery, Bariatric General Sample Meal Plans, Plate Method, 7+ Days of Sample Meal Ideas, 100 Calorie Snacks...or Less, Patient Journey Form, Support Group Form      Monitoring and Evaluation:   Self-reported adherence score: The patient will comply with interventions in order to be an appropriate candidate for bariatric surgery.    Area(s)  and level of knowledge: Pt shows Basic level of knowledge regarding diet recommendations for healthy weight loss and preparation for bariatric surgery prior to education.    Insurance Requirements-   Patient has/not met insurance requirements, insurance will not cover, patient is self-pay  Surgery type allowed:   Sofy en Y Gastric Bypass  Adjustable Gastric Band  Vertical Banded Gastroplasty  Sleeve Gastrectomy  BMI >35 plus 1 co-morbidity  Weight loss: 5% of initial weight  3 months physician-supervised weight loss, including at least 3 consecutive  Exercise:  3 times per week    Writer reminded patient that clearance is based not only upon meeting insurance requirements, but also on meeting clinical goals (nutritional and psychological).    Recommended Follow-up:   At this time the patient is not yet considered an appropriate candidate for surgery from a nutrition perspective. To follow up about 1 month from now.        Yes

## 2024-07-18 NOTE — DISCHARGE NOTE OB - CARE PROVIDER_API CALL
Leisa Martinez  Obstetrics and Gynecology  [address], [address]  [place] [address]  Phone: [phone]  Fax: [phone]  Follow Up Time: Routine

## 2024-08-05 ENCOUNTER — APPOINTMENT (OUTPATIENT)
Dept: OBGYN | Facility: CLINIC | Age: 29
End: 2024-08-05

## 2024-08-05 PROCEDURE — 99212 OFFICE O/P EST SF 10 MIN: CPT | Mod: 25

## 2024-08-05 NOTE — HISTORY OF PRESENT ILLNESS
[0/10] : no pain reported [Fever] : no fever [Chills] : no chills [Nausea] : no nausea [Vomiting] : no vomiting [Diarrhea] : no diarrhea [Vaginal Bleeding] : no vaginal bleeding [Pelvic Pressure] : no pelvic pressure [Dysuria] : no dysuria [Vaginal Discharge] : no vaginal discharge [Constipation] : no constipation

## 2024-08-13 ENCOUNTER — RX RENEWAL (OUTPATIENT)
Age: 29
End: 2024-08-13

## 2024-08-27 ENCOUNTER — APPOINTMENT (OUTPATIENT)
Dept: OBGYN | Facility: CLINIC | Age: 29
End: 2024-08-27

## 2024-08-27 VITALS
RESPIRATION RATE: 16 BRPM | HEART RATE: 71 BPM | BODY MASS INDEX: 22.36 KG/M2 | WEIGHT: 151 LBS | DIASTOLIC BLOOD PRESSURE: 71 MMHG | OXYGEN SATURATION: 98 % | SYSTOLIC BLOOD PRESSURE: 127 MMHG | HEIGHT: 69 IN

## 2024-08-27 PROCEDURE — 99213 OFFICE O/P EST LOW 20 MIN: CPT | Mod: TH

## 2024-08-27 NOTE — HISTORY OF PRESENT ILLNESS
[Postpartum Consultation] : postpartum consultation [Delivery Date: ___] : on [unfilled] [] : delivered by vaginal delivery [Breastfeeding] : currently nursing

## 2024-11-05 ENCOUNTER — RESULT REVIEW (OUTPATIENT)
Age: 29
End: 2024-11-05

## 2024-11-05 ENCOUNTER — APPOINTMENT (OUTPATIENT)
Dept: ULTRASOUND IMAGING | Facility: IMAGING CENTER | Age: 29
End: 2024-11-05
Payer: MEDICAID

## 2024-11-05 ENCOUNTER — OUTPATIENT (OUTPATIENT)
Dept: OUTPATIENT SERVICES | Facility: HOSPITAL | Age: 29
LOS: 1 days | End: 2024-11-05
Payer: MEDICAID

## 2024-11-05 DIAGNOSIS — Z98.890 OTHER SPECIFIED POSTPROCEDURAL STATES: Chronic | ICD-10-CM

## 2024-11-05 DIAGNOSIS — R92.8 OTHER ABNORMAL AND INCONCLUSIVE FINDINGS ON DIAGNOSTIC IMAGING OF BREAST: ICD-10-CM

## 2024-11-05 PROCEDURE — 76642 ULTRASOUND BREAST LIMITED: CPT

## 2024-11-05 PROCEDURE — 76641 ULTRASOUND BREAST COMPLETE: CPT

## 2024-11-05 PROCEDURE — 76642 ULTRASOUND BREAST LIMITED: CPT | Mod: 26,50

## 2024-11-08 ENCOUNTER — APPOINTMENT (OUTPATIENT)
Dept: INTERNAL MEDICINE | Facility: CLINIC | Age: 29
End: 2024-11-08

## 2024-11-12 ENCOUNTER — APPOINTMENT (OUTPATIENT)
Dept: SURGICAL ONCOLOGY | Facility: CLINIC | Age: 29
End: 2024-11-12
Payer: MEDICAID

## 2024-11-12 VITALS
HEIGHT: 69 IN | SYSTOLIC BLOOD PRESSURE: 119 MMHG | HEART RATE: 81 BPM | DIASTOLIC BLOOD PRESSURE: 81 MMHG | BODY MASS INDEX: 20.73 KG/M2 | OXYGEN SATURATION: 97 % | WEIGHT: 140 LBS

## 2024-11-12 DIAGNOSIS — R92.8 OTHER ABNORMAL AND INCONCLUSIVE FINDINGS ON DIAGNOSTIC IMAGING OF BREAST: ICD-10-CM

## 2024-11-12 PROCEDURE — 99213 OFFICE O/P EST LOW 20 MIN: CPT

## 2024-11-15 ENCOUNTER — APPOINTMENT (OUTPATIENT)
Dept: INTERNAL MEDICINE | Facility: CLINIC | Age: 29
End: 2024-11-15
Payer: MEDICAID

## 2024-11-15 VITALS
HEIGHT: 69 IN | DIASTOLIC BLOOD PRESSURE: 71 MMHG | TEMPERATURE: 97.1 F | BODY MASS INDEX: 22.22 KG/M2 | HEART RATE: 64 BPM | RESPIRATION RATE: 16 BRPM | OXYGEN SATURATION: 99 % | WEIGHT: 150 LBS | SYSTOLIC BLOOD PRESSURE: 121 MMHG

## 2024-11-15 DIAGNOSIS — D64.9 ANEMIA, UNSPECIFIED: ICD-10-CM

## 2024-11-15 PROCEDURE — 99213 OFFICE O/P EST LOW 20 MIN: CPT | Mod: 25

## 2024-11-16 LAB
ALBUMIN SERPL ELPH-MCNC: 4.5 G/DL
ALP BLD-CCNC: 89 U/L
ALT SERPL-CCNC: 20 U/L
ANION GAP SERPL CALC-SCNC: 12 MMOL/L
AST SERPL-CCNC: 19 U/L
BASOPHILS # BLD AUTO: 0.03 K/UL
BASOPHILS NFR BLD AUTO: 0.4 %
BILIRUB SERPL-MCNC: 0.3 MG/DL
BUN SERPL-MCNC: 13 MG/DL
CALCIUM SERPL-MCNC: 10 MG/DL
CHLORIDE SERPL-SCNC: 104 MMOL/L
CO2 SERPL-SCNC: 27 MMOL/L
CREAT SERPL-MCNC: 0.85 MG/DL
EGFR: 94 ML/MIN/1.73M2
EOSINOPHIL # BLD AUTO: 0.19 K/UL
EOSINOPHIL NFR BLD AUTO: 2.7 %
GLUCOSE SERPL-MCNC: 82 MG/DL
HCT VFR BLD CALC: 37 %
HGB BLD-MCNC: 12.2 G/DL
IMM GRANULOCYTES NFR BLD AUTO: 0.1 %
IRON SATN MFR SERPL: 20 %
IRON SERPL-MCNC: 78 UG/DL
LYMPHOCYTES # BLD AUTO: 3.2 K/UL
LYMPHOCYTES NFR BLD AUTO: 46.1 %
MAN DIFF?: NORMAL
MCHC RBC-ENTMCNC: 26.8 PG
MCHC RBC-ENTMCNC: 33 G/DL
MCV RBC AUTO: 81.3 FL
MONOCYTES # BLD AUTO: 0.47 K/UL
MONOCYTES NFR BLD AUTO: 6.8 %
NEUTROPHILS # BLD AUTO: 3.04 K/UL
NEUTROPHILS NFR BLD AUTO: 43.9 %
PLATELET # BLD AUTO: 311 K/UL
POTASSIUM SERPL-SCNC: 4.1 MMOL/L
PROT SERPL-MCNC: 7.8 G/DL
RBC # BLD: 4.55 M/UL
RBC # FLD: 12.5 %
SODIUM SERPL-SCNC: 142 MMOL/L
TIBC SERPL-MCNC: 390 UG/DL
UIBC SERPL-MCNC: 312 UG/DL
WBC # FLD AUTO: 6.94 K/UL

## 2025-01-21 ENCOUNTER — NON-APPOINTMENT (OUTPATIENT)
Age: 30
End: 2025-01-21

## 2025-01-21 ENCOUNTER — APPOINTMENT (OUTPATIENT)
Dept: OBGYN | Facility: CLINIC | Age: 30
End: 2025-01-21

## 2025-01-21 ENCOUNTER — LABORATORY RESULT (OUTPATIENT)
Age: 30
End: 2025-01-21

## 2025-01-21 VITALS
DIASTOLIC BLOOD PRESSURE: 68 MMHG | RESPIRATION RATE: 16 BRPM | BODY MASS INDEX: 20.59 KG/M2 | WEIGHT: 139 LBS | TEMPERATURE: 97.2 F | HEIGHT: 69 IN | HEART RATE: 60 BPM | SYSTOLIC BLOOD PRESSURE: 119 MMHG | OXYGEN SATURATION: 99 %

## 2025-01-21 DIAGNOSIS — Z01.419 ENCOUNTER FOR GYNECOLOGICAL EXAMINATION (GENERAL) (ROUTINE) W/OUT ABNORMAL FINDINGS: ICD-10-CM

## 2025-01-21 PROCEDURE — 99395 PREV VISIT EST AGE 18-39: CPT

## 2025-01-25 LAB
C TRACH RRNA SPEC QL NAA+PROBE: NOT DETECTED
N GONORRHOEA RRNA SPEC QL NAA+PROBE: NOT DETECTED
SOURCE TP AMPLIFICATION: NORMAL
T VAGINALIS RRNA SPEC QL NAA+PROBE: NOT DETECTED

## 2025-03-12 NOTE — ASU PREOP CHECKLIST - WEIGHT IN KG
58.1
Patient tentatively scheduled for cystoscopy, left retrograde pyelogram left uteroscopy with laser ablation / biopsy , left ureteral stent on 3/19/25.  Pre-op instructions provided. Pt given verbal and written instructions with teach back on chlorhexidine wash and pepcid. Pt verbalized understanding with return demonstration.

## 2025-03-27 ENCOUNTER — APPOINTMENT (OUTPATIENT)
Dept: INTERNAL MEDICINE | Facility: CLINIC | Age: 30
End: 2025-03-27
Payer: COMMERCIAL

## 2025-03-27 VITALS
WEIGHT: 132 LBS | TEMPERATURE: 97.9 F | DIASTOLIC BLOOD PRESSURE: 78 MMHG | OXYGEN SATURATION: 100 % | HEIGHT: 69 IN | BODY MASS INDEX: 19.55 KG/M2 | HEART RATE: 77 BPM | SYSTOLIC BLOOD PRESSURE: 116 MMHG

## 2025-03-27 DIAGNOSIS — J03.90 ACUTE TONSILLITIS, UNSPECIFIED: ICD-10-CM

## 2025-03-27 DIAGNOSIS — J06.9 ACUTE UPPER RESPIRATORY INFECTION, UNSPECIFIED: ICD-10-CM

## 2025-03-27 PROCEDURE — G2211 COMPLEX E/M VISIT ADD ON: CPT | Mod: NC

## 2025-03-27 PROCEDURE — 99213 OFFICE O/P EST LOW 20 MIN: CPT

## 2025-03-30 LAB
RESP PATH DNA+RNA PNL NPH NAA+NON-PROBE: NOT DETECTED
SARS-COV-2 RNA RESP QL NAA+PROBE: NOT DETECTED

## 2025-05-19 ENCOUNTER — OUTPATIENT (OUTPATIENT)
Dept: OUTPATIENT SERVICES | Facility: HOSPITAL | Age: 30
LOS: 1 days | End: 2025-05-19
Payer: COMMERCIAL

## 2025-05-19 ENCOUNTER — APPOINTMENT (OUTPATIENT)
Dept: ULTRASOUND IMAGING | Facility: CLINIC | Age: 30
End: 2025-05-19
Payer: COMMERCIAL

## 2025-05-19 ENCOUNTER — RESULT REVIEW (OUTPATIENT)
Age: 30
End: 2025-05-19

## 2025-05-19 DIAGNOSIS — Z00.00 ENCOUNTER FOR GENERAL ADULT MEDICAL EXAMINATION WITHOUT ABNORMAL FINDINGS: ICD-10-CM

## 2025-05-19 DIAGNOSIS — Z98.890 OTHER SPECIFIED POSTPROCEDURAL STATES: Chronic | ICD-10-CM

## 2025-05-19 PROCEDURE — 76642 ULTRASOUND BREAST LIMITED: CPT

## 2025-05-19 PROCEDURE — 76642 ULTRASOUND BREAST LIMITED: CPT | Mod: 26,RT

## 2025-07-10 ENCOUNTER — APPOINTMENT (OUTPATIENT)
Age: 30
End: 2025-07-10
Payer: COMMERCIAL

## 2025-07-10 ENCOUNTER — APPOINTMENT (OUTPATIENT)
Age: 30
End: 2025-07-10

## 2025-07-10 ENCOUNTER — ASOB RESULT (OUTPATIENT)
Age: 30
End: 2025-07-10

## 2025-07-10 VITALS
OXYGEN SATURATION: 100 % | DIASTOLIC BLOOD PRESSURE: 70 MMHG | HEART RATE: 80 BPM | TEMPERATURE: 99 F | HEIGHT: 69 IN | BODY MASS INDEX: 19.85 KG/M2 | WEIGHT: 134 LBS | RESPIRATION RATE: 16 BRPM | SYSTOLIC BLOOD PRESSURE: 113 MMHG

## 2025-07-10 PROBLEM — O26.879 CERVIX, SHORT (AFFECTING PREGNANCY): Status: ACTIVE | Noted: 2025-07-10

## 2025-07-10 PROCEDURE — 76817 TRANSVAGINAL US OBSTETRIC: CPT

## 2025-07-10 PROCEDURE — 99213 OFFICE O/P EST LOW 20 MIN: CPT | Mod: 25

## 2025-07-10 PROCEDURE — 81025 URINE PREGNANCY TEST: CPT

## 2025-07-10 RX ORDER — PROGESTERONE 200 MG/1
200 CAPSULE ORAL
Qty: 30 | Refills: 7 | Status: ACTIVE | COMMUNITY
Start: 2025-07-10 | End: 1900-01-01

## 2025-07-11 ENCOUNTER — APPOINTMENT (OUTPATIENT)
Dept: SURGICAL ONCOLOGY | Facility: CLINIC | Age: 30
End: 2025-07-11

## 2025-07-11 PROBLEM — Z86.018 HISTORY OF FIBROADENOMA OF LEFT BREAST: Status: RESOLVED | Noted: 2023-03-26 | Resolved: 2025-07-11

## 2025-07-14 LAB
BILIRUB UR QL STRIP: NEGATIVE
CLARITY UR: CLEAR
COLLECTION METHOD: NORMAL
GLUCOSE UR-MCNC: NEGATIVE
HCG UR QL: 0.2 EU/DL
HCG UR QL: POSITIVE
HGB UR QL STRIP.AUTO: NORMAL
KETONES UR-MCNC: NEGATIVE
LEUKOCYTE ESTERASE UR QL STRIP: NEGATIVE
NITRITE UR QL STRIP: NEGATIVE
PH UR STRIP: 6.5
PROT UR STRIP-MCNC: NEGATIVE
SP GR UR STRIP: >=1.03

## 2025-08-12 ENCOUNTER — APPOINTMENT (OUTPATIENT)
Dept: OBGYN | Facility: CLINIC | Age: 30
End: 2025-08-12
Payer: COMMERCIAL

## 2025-08-12 ENCOUNTER — NON-APPOINTMENT (OUTPATIENT)
Age: 30
End: 2025-08-12

## 2025-08-12 ENCOUNTER — ASOB RESULT (OUTPATIENT)
Age: 30
End: 2025-08-12

## 2025-08-12 VITALS
HEART RATE: 91 BPM | WEIGHT: 134 LBS | OXYGEN SATURATION: 98 % | SYSTOLIC BLOOD PRESSURE: 118 MMHG | BODY MASS INDEX: 19.85 KG/M2 | TEMPERATURE: 98.7 F | RESPIRATION RATE: 16 BRPM | HEIGHT: 69 IN | DIASTOLIC BLOOD PRESSURE: 67 MMHG

## 2025-08-12 DIAGNOSIS — Z3A.09 9 WEEKS GESTATION OF PREGNANCY: ICD-10-CM

## 2025-08-12 DIAGNOSIS — R42 DIZZINESS AND GIDDINESS: ICD-10-CM

## 2025-08-12 DIAGNOSIS — O99.611 DISEASES OF THE DIGESTIVE SYSTEM COMPLICATING PREGNANCY, FIRST TRIMESTER: ICD-10-CM

## 2025-08-12 DIAGNOSIS — O21.9 VOMITING OF PREGNANCY, UNSPECIFIED: ICD-10-CM

## 2025-08-12 DIAGNOSIS — K59.00 DISEASES OF THE DIGESTIVE SYSTEM COMPLICATING PREGNANCY, FIRST TRIMESTER: ICD-10-CM

## 2025-08-12 DIAGNOSIS — R00.2 PALPITATIONS: ICD-10-CM

## 2025-08-12 PROCEDURE — 99213 OFFICE O/P EST LOW 20 MIN: CPT | Mod: 25

## 2025-08-12 PROCEDURE — 76817 TRANSVAGINAL US OBSTETRIC: CPT

## 2025-08-12 RX ORDER — PSEUDOEPHEDRINE HCL 30 MG
27-0.8 TABLET ORAL DAILY
Qty: 30 | Refills: 8 | Status: ACTIVE | COMMUNITY
Start: 2025-08-12 | End: 1900-01-01

## 2025-08-12 RX ORDER — METOCLOPRAMIDE 10 MG/1
10 TABLET ORAL EVERY 6 HOURS
Qty: 120 | Refills: 0 | Status: ACTIVE | COMMUNITY
Start: 2025-08-12 | End: 1900-01-01

## 2025-08-12 RX ORDER — DOXYLAMINE SUCCINATE 25 MG/1
25 TABLET ORAL
Qty: 30 | Refills: 3 | Status: ACTIVE | COMMUNITY
Start: 2025-08-12 | End: 1900-01-01

## 2025-08-12 RX ORDER — PYRIDOXINE HCL (VITAMIN B6) 25 MG
25 TABLET ORAL
Qty: 30 | Refills: 1 | Status: ACTIVE | COMMUNITY
Start: 2025-08-12 | End: 1900-01-01

## 2025-08-12 RX ORDER — DOCUSATE SODIUM 100 MG/1
100 CAPSULE ORAL 3 TIMES DAILY
Qty: 90 | Refills: 11 | Status: ACTIVE | COMMUNITY
Start: 2025-08-12 | End: 1900-01-01

## 2025-08-13 LAB
BASOPHILS # BLD AUTO: 0.02 K/UL
BASOPHILS NFR BLD AUTO: 0.2 %
BILIRUB UR QL STRIP: NEGATIVE
CLARITY UR: CLEAR
COLLECTION METHOD: NORMAL
EOSINOPHIL # BLD AUTO: 0.07 K/UL
EOSINOPHIL NFR BLD AUTO: 0.9 %
GLUCOSE UR-MCNC: NEGATIVE
HCG UR QL: 0.2 EU/DL
HCT VFR BLD CALC: 33.3 %
HGB BLD-MCNC: 11.2 G/DL
HGB UR QL STRIP.AUTO: ABNORMAL
IMM GRANULOCYTES NFR BLD AUTO: 0.4 %
KETONES UR-MCNC: NEGATIVE
LEUKOCYTE ESTERASE UR QL STRIP: ABNORMAL
LYMPHOCYTES # BLD AUTO: 2.66 K/UL
LYMPHOCYTES NFR BLD AUTO: 33.1 %
MAN DIFF?: NORMAL
MCHC RBC-ENTMCNC: 27.4 PG
MCHC RBC-ENTMCNC: 33.6 G/DL
MCV RBC AUTO: 81.4 FL
MONOCYTES # BLD AUTO: 0.67 K/UL
MONOCYTES NFR BLD AUTO: 8.3 %
NEUTROPHILS # BLD AUTO: 4.58 K/UL
NEUTROPHILS NFR BLD AUTO: 57.1 %
NITRITE UR QL STRIP: NEGATIVE
PH UR STRIP: 7
PLATELET # BLD AUTO: 295 K/UL
PROT UR STRIP-MCNC: 30
RBC # BLD: 4.09 M/UL
RBC # FLD: 13 %
SP GR UR STRIP: 1.02
WBC # FLD AUTO: 8.03 K/UL

## 2025-08-21 ENCOUNTER — APPOINTMENT (OUTPATIENT)
Age: 30
End: 2025-08-21
Payer: COMMERCIAL

## 2025-08-21 VITALS
HEIGHT: 69 IN | HEART RATE: 92 BPM | BODY MASS INDEX: 20.29 KG/M2 | WEIGHT: 137 LBS | SYSTOLIC BLOOD PRESSURE: 132 MMHG | DIASTOLIC BLOOD PRESSURE: 76 MMHG | OXYGEN SATURATION: 98 % | TEMPERATURE: 98.3 F | RESPIRATION RATE: 16 BRPM

## 2025-08-21 DIAGNOSIS — Z34.91 ENCOUNTER FOR SUPERVISION OF NORMAL PREGNANCY, UNSPECIFIED, FIRST TRIMESTER: ICD-10-CM

## 2025-08-21 PROCEDURE — 99213 OFFICE O/P EST LOW 20 MIN: CPT | Mod: 25

## 2025-08-21 PROCEDURE — 81002 URINALYSIS NONAUTO W/O SCOPE: CPT

## 2025-08-24 LAB
25(OH)D3 SERPL-MCNC: 28 NG/ML
BACTERIA UR CULT: NORMAL
BASOPHILS # BLD AUTO: 0.02 K/UL
BASOPHILS NFR BLD AUTO: 0.2 %
EOSINOPHIL # BLD AUTO: 0.12 K/UL
EOSINOPHIL NFR BLD AUTO: 1.2 %
ESTIMATED AVERAGE GLUCOSE: 103 MG/DL
HBA1C MFR BLD HPLC: 5.2 %
HBV SURFACE AG SER QL: NONREACTIVE
HCT VFR BLD CALC: 34.4 %
HCV AB SER QL: NONREACTIVE
HCV S/CO RATIO: 0.1 S/CO
HGB BLD-MCNC: 11.5 G/DL
HIV1+2 AB SPEC QL IA.RAPID: NONREACTIVE
IMM GRANULOCYTES NFR BLD AUTO: 0.5 %
LEAD BLD-MCNC: <1 UG/DL
LYMPHOCYTES # BLD AUTO: 2.83 K/UL
LYMPHOCYTES NFR BLD AUTO: 27.6 %
MAN DIFF?: NORMAL
MCHC RBC-ENTMCNC: 27.4 PG
MCHC RBC-ENTMCNC: 33.4 G/DL
MCV RBC AUTO: 82.1 FL
MEV IGG FLD QL IA: 63 AU/ML
MEV IGG+IGM SER-IMP: POSITIVE
MONOCYTES # BLD AUTO: 0.66 K/UL
MONOCYTES NFR BLD AUTO: 6.4 %
NEUTROPHILS # BLD AUTO: 6.57 K/UL
NEUTROPHILS NFR BLD AUTO: 64.1 %
PLATELET # BLD AUTO: 292 K/UL
RBC # BLD: 4.19 M/UL
RBC # FLD: 13.2 %
RUBV IGG FLD-ACNC: 4.05 INDEX
RUBV IGG SER-IMP: POSITIVE
T PALLIDUM AB SER QL IA: NEGATIVE
T4 FREE SERPL-MCNC: 1.3 NG/DL
TSH SERPL-ACNC: 0.24 UIU/ML
VZV AB TITR SER: POSITIVE
VZV IGG SER IF-ACNC: 8.57 S/CO
WBC # FLD AUTO: 10.25 K/UL

## 2025-08-26 LAB
BILIRUB UR QL STRIP: NEGATIVE
CLARITY UR: CLEAR
COLLECTION METHOD: NORMAL
GLUCOSE UR-MCNC: NEGATIVE
HCG UR QL: 0.2 EU/DL
HGB UR QL STRIP.AUTO: NEGATIVE
KETONES UR-MCNC: NEGATIVE
LEUKOCYTE ESTERASE UR QL STRIP: NORMAL
M TB IFN-G BLD-IMP: NEGATIVE
NITRITE UR QL STRIP: NEGATIVE
PH UR STRIP: 7.5
PROT UR STRIP-MCNC: NEGATIVE
QUANTIFERON TB PLUS MITOGEN MINUS NIL: 7.42 IU/ML
QUANTIFERON TB PLUS NIL: 0.02 IU/ML
QUANTIFERON TB PLUS TB1 MINUS NIL: 0 IU/ML
QUANTIFERON TB PLUS TB2 MINUS NIL: 0 IU/ML
SP GR UR STRIP: 1.01

## 2025-09-03 ENCOUNTER — RX RENEWAL (OUTPATIENT)
Age: 30
End: 2025-09-03

## 2025-09-09 ENCOUNTER — APPOINTMENT (OUTPATIENT)
Dept: ANTEPARTUM | Facility: CLINIC | Age: 30
End: 2025-09-09
Payer: COMMERCIAL

## 2025-09-09 ENCOUNTER — ASOB RESULT (OUTPATIENT)
Age: 30
End: 2025-09-09

## 2025-09-09 PROCEDURE — 76813 OB US NUCHAL MEAS 1 GEST: CPT

## 2025-09-09 PROCEDURE — 76801 OB US < 14 WKS SINGLE FETUS: CPT | Mod: 59

## (undated) DEVICE — DRAPE LITHOTOMY PERI-GYN

## (undated) DEVICE — SPECIMEN CONTAINER 100ML

## (undated) DEVICE — GLV 6.5 PROTEXIS (WHITE)

## (undated) DEVICE — DRAPE LAPAROTOMY TRANSVERSE

## (undated) DEVICE — DRSG TELFA 3 X 8

## (undated) DEVICE — CANISTER DISPOSABLE THIN WALL 3000CC

## (undated) DEVICE — WARMING BLANKET UPPER ADULT

## (undated) DEVICE — GLV 7 PROTEXIS (BLUE)

## (undated) DEVICE — SOL IRR POUR H2O 250ML

## (undated) DEVICE — VENODYNE/SCD SLEEVE CALF MEDIUM

## (undated) DEVICE — TAPE SILK 3"

## (undated) DEVICE — FOR-ESU VALLEYLAB T7E15009DX: Type: DURABLE MEDICAL EQUIPMENT

## (undated) DEVICE — SUT POLYSORB 3-0 30" V-20 UNDYED

## (undated) DEVICE — MEDICATION LABELS W MARKER

## (undated) DEVICE — TUBING SUCTION NONCONDUCTIVE 6MM X 12FT

## (undated) DEVICE — LABELS BLANK W PEN

## (undated) DEVICE — SUT PDS II 1 48" TP-1

## (undated) DEVICE — DRSG CURITY GAUZE SPONGE 4 X 4" 12-PLY NON-STERILE

## (undated) DEVICE — POSITIONER FOAM EGG CRATE ULNAR 2PCS (PINK)

## (undated) DEVICE — TAPE SILK 2"

## (undated) DEVICE — DRSG COMBINE 5X9"

## (undated) DEVICE — PACK MINOR

## (undated) DEVICE — NDL HYPO SAFE 30G X .5" (YELLOW)

## (undated) DEVICE — SUT MONOCRYL 4-0 27" PS-2 UNDYED

## (undated) DEVICE — ANESTHESIA CIRCUIT ADULT HMEF

## (undated) DEVICE — SUT ETHIBOND 0 30" CT-1 GREEN

## (undated) DEVICE — SOL IRR POUR NS 0.9% 500ML

## (undated) DEVICE — SUT VICRYL 2-0 27" SH UNDYED

## (undated) DEVICE — SUT MONOCRYL 4-0 18" P-3 UNDYED

## (undated) DEVICE — DRSG TAPE MEDIPORE 6"

## (undated) DEVICE — LUBRICATING JELLY ONESHOT 1.25OZ

## (undated) DEVICE — VESSEL LOOP MAXI-YELLOW  0.120" X 16"

## (undated) DEVICE — PACK MINOR WITH LAP

## (undated) DEVICE — GLV 7.5 PROTEXIS (WHITE)

## (undated) DEVICE — PREP BETADINE SPONGE STICKS

## (undated) DEVICE — ELCTR BOVIE BLADE 3/4" EXTENDED LENGTH 6"

## (undated) DEVICE — LIGASURE EXACT DISSECTOR

## (undated) DEVICE — SUCTION YANKAUER OPEN TIP NO VENT CURVE

## (undated) DEVICE — ELCTR BOVIE TIP BLADE INSULATED 2.8" EDGE WITH SAFETY

## (undated) DEVICE — DRSG MASTISOL